# Patient Record
Sex: FEMALE | Race: WHITE | Employment: OTHER | URBAN - METROPOLITAN AREA
[De-identification: names, ages, dates, MRNs, and addresses within clinical notes are randomized per-mention and may not be internally consistent; named-entity substitution may affect disease eponyms.]

---

## 2017-09-06 ENCOUNTER — HOSPITAL ENCOUNTER (OUTPATIENT)
Dept: GENERAL RADIOLOGY | Age: 72
Discharge: HOME | End: 2017-09-06

## 2017-09-06 DIAGNOSIS — R10.32 LEFT LOWER QUADRANT PAIN: ICD-10-CM

## 2017-09-06 LAB
A/G RATIO: 1.5 (ref 1.1–2.2)
ALBUMIN SERPL-MCNC: 4.1 G/DL (ref 3.4–5)
ALP BLD-CCNC: 70 U/L (ref 40–129)
ALT SERPL-CCNC: 8 U/L (ref 10–40)
AMYLASE: 61 U/L (ref 25–115)
ANION GAP SERPL CALCULATED.3IONS-SCNC: 11 MMOL/L (ref 3–16)
AST SERPL-CCNC: 16 U/L (ref 15–37)
BASOPHILS ABSOLUTE: 0 K/UL (ref 0–0.2)
BASOPHILS RELATIVE PERCENT: 0.7 %
BILIRUB SERPL-MCNC: 0.6 MG/DL (ref 0–1)
BUN BLDV-MCNC: 18 MG/DL (ref 7–20)
CALCIUM SERPL-MCNC: 9 MG/DL (ref 8.3–10.6)
CHLORIDE BLD-SCNC: 99 MMOL/L (ref 99–110)
CO2: 28 MMOL/L (ref 21–32)
CREAT SERPL-MCNC: 0.7 MG/DL (ref 0.6–1.2)
EOSINOPHILS ABSOLUTE: 0.1 K/UL (ref 0–0.6)
EOSINOPHILS RELATIVE PERCENT: 1.4 %
GFR AFRICAN AMERICAN: >60
GFR NON-AFRICAN AMERICAN: >60
GLOBULIN: 2.7 G/DL
GLUCOSE BLD-MCNC: 87 MG/DL (ref 70–99)
HCT VFR BLD CALC: 39.6 % (ref 36–48)
HEMOGLOBIN: 13.1 G/DL (ref 12–16)
LIPASE: 47 U/L (ref 13–60)
LYMPHOCYTES ABSOLUTE: 1.7 K/UL (ref 1–5.1)
LYMPHOCYTES RELATIVE PERCENT: 30.3 %
MCH RBC QN AUTO: 32.3 PG (ref 26–34)
MCHC RBC AUTO-ENTMCNC: 33 G/DL (ref 31–36)
MCV RBC AUTO: 97.9 FL (ref 80–100)
MONOCYTES ABSOLUTE: 0.2 K/UL (ref 0–1.3)
MONOCYTES RELATIVE PERCENT: 3.8 %
NEUTROPHILS ABSOLUTE: 3.6 K/UL (ref 1.7–7.7)
NEUTROPHILS RELATIVE PERCENT: 63.8 %
PDW BLD-RTO: 13.8 % (ref 12.4–15.4)
PLATELET # BLD: 231 K/UL (ref 135–450)
PMV BLD AUTO: 8.3 FL (ref 5–10.5)
POTASSIUM SERPL-SCNC: 4.7 MMOL/L (ref 3.5–5.1)
RBC # BLD: 4.04 M/UL (ref 4–5.2)
SODIUM BLD-SCNC: 138 MMOL/L (ref 136–145)
TOTAL PROTEIN: 6.8 G/DL (ref 6.4–8.2)
WBC # BLD: 5.6 K/UL (ref 4–11)

## 2018-05-07 ENCOUNTER — OFFICE VISIT (OUTPATIENT)
Dept: PULMONOLOGY | Age: 73
End: 2018-05-07

## 2018-05-07 ENCOUNTER — HOSPITAL ENCOUNTER (OUTPATIENT)
Dept: CT IMAGING | Age: 73
Discharge: HOME | End: 2018-05-07
Attending: INTERNAL MEDICINE | Admitting: INTERNAL MEDICINE

## 2018-05-07 VITALS
HEART RATE: 60 BPM | BODY MASS INDEX: 25.78 KG/M2 | WEIGHT: 160.4 LBS | RESPIRATION RATE: 18 BRPM | TEMPERATURE: 97.9 F | OXYGEN SATURATION: 97 % | SYSTOLIC BLOOD PRESSURE: 130 MMHG | HEIGHT: 66 IN | DIASTOLIC BLOOD PRESSURE: 70 MMHG

## 2018-05-07 DIAGNOSIS — R07.9 CHEST PAIN, UNSPECIFIED TYPE: ICD-10-CM

## 2018-05-07 DIAGNOSIS — R07.9 CHEST PAIN: ICD-10-CM

## 2018-05-07 DIAGNOSIS — Z86.711 HISTORY OF PULMONARY EMBOLUS (PE): ICD-10-CM

## 2018-05-07 DIAGNOSIS — Z01.812 PRE-PROCEDURE LAB EXAM: ICD-10-CM

## 2018-05-07 DIAGNOSIS — J98.8 RECURRENT RESPIRATORY INFECTION: Primary | ICD-10-CM

## 2018-05-07 DIAGNOSIS — Z86.711 HISTORY OF PULMONARY EMBOLISM: ICD-10-CM

## 2018-05-07 LAB
ALBUMIN SERPL-MCNC: 4.2 G/DL (ref 3.4–5)
ANION GAP SERPL CALCULATED.3IONS-SCNC: 7 MMOL/L (ref 3–16)
BUN BLDV-MCNC: 13 MG/DL (ref 7–20)
CALCIUM SERPL-MCNC: 9.3 MG/DL (ref 8.3–10.6)
CHLORIDE BLD-SCNC: 103 MMOL/L (ref 99–110)
CO2: 31 MMOL/L (ref 21–32)
CREAT SERPL-MCNC: 0.7 MG/DL (ref 0.6–1.2)
GFR AFRICAN AMERICAN: >60
GFR NON-AFRICAN AMERICAN: >60
GLUCOSE BLD-MCNC: 95 MG/DL (ref 70–99)
PHOSPHORUS: 3.7 MG/DL (ref 2.5–4.9)
POTASSIUM SERPL-SCNC: 4.7 MMOL/L (ref 3.5–5.1)
SODIUM BLD-SCNC: 141 MMOL/L (ref 136–145)

## 2018-05-07 PROCEDURE — 3017F COLORECTAL CA SCREEN DOC REV: CPT | Performed by: INTERNAL MEDICINE

## 2018-05-07 PROCEDURE — G8419 CALC BMI OUT NRM PARAM NOF/U: HCPCS | Performed by: INTERNAL MEDICINE

## 2018-05-07 PROCEDURE — 1090F PRES/ABSN URINE INCON ASSESS: CPT | Performed by: INTERNAL MEDICINE

## 2018-05-07 PROCEDURE — G8427 DOCREV CUR MEDS BY ELIG CLIN: HCPCS | Performed by: INTERNAL MEDICINE

## 2018-05-07 PROCEDURE — 99215 OFFICE O/P EST HI 40 MIN: CPT | Performed by: INTERNAL MEDICINE

## 2018-05-07 RX ORDER — ALOSETRON HYDROCHLORIDE 0.5 MG/1
0.5 TABLET, FILM COATED ORAL 2 TIMES DAILY
COMMUNITY
Start: 2018-04-10

## 2018-05-08 ENCOUNTER — TELEPHONE (OUTPATIENT)
Dept: PULMONOLOGY | Age: 73
End: 2018-05-08

## 2018-05-08 DIAGNOSIS — R91.8 PULMONARY NODULES: Primary | ICD-10-CM

## 2018-05-14 ENCOUNTER — HOSPITAL ENCOUNTER (OUTPATIENT)
Dept: SPEECH THERAPY | Age: 73
Discharge: HOME | End: 2018-05-31
Attending: INTERNAL MEDICINE | Admitting: INTERNAL MEDICINE

## 2018-05-16 ENCOUNTER — HOSPITAL ENCOUNTER (OUTPATIENT)
Dept: GENERAL RADIOLOGY | Age: 73
Discharge: HOME | End: 2018-05-16
Admitting: INTERNAL MEDICINE

## 2018-05-16 DIAGNOSIS — R13.10 DYSPHAGIA, UNSPECIFIED TYPE: ICD-10-CM

## 2018-05-22 ENCOUNTER — HOSPITAL ENCOUNTER (OUTPATIENT)
Dept: PULMONOLOGY | Age: 73
Discharge: HOME | End: 2018-05-22
Attending: INTERNAL MEDICINE | Admitting: INTERNAL MEDICINE

## 2018-05-22 VITALS — OXYGEN SATURATION: 100 %

## 2018-05-22 DIAGNOSIS — R07.9 CHEST PAIN: ICD-10-CM

## 2018-05-22 RX ORDER — ALBUTEROL SULFATE 2.5 MG/3ML
2.5 SOLUTION RESPIRATORY (INHALATION) ONCE
Status: COMPLETED | OUTPATIENT
Start: 2018-05-22 | End: 2018-05-22

## 2018-05-22 RX ADMIN — ALBUTEROL SULFATE 2.5 MG: 2.5 SOLUTION RESPIRATORY (INHALATION) at 10:21

## 2018-05-23 ENCOUNTER — TELEPHONE (OUTPATIENT)
Dept: PULMONOLOGY | Age: 73
End: 2018-05-23

## 2018-05-23 DIAGNOSIS — J98.8 RECURRENT RESPIRATORY INFECTION: Primary | ICD-10-CM

## 2018-06-01 ENCOUNTER — HOSPITAL ENCOUNTER (OUTPATIENT)
Dept: OTHER | Age: 73
Discharge: HOME | End: 2018-06-30
Attending: INTERNAL MEDICINE | Admitting: INTERNAL MEDICINE

## 2018-06-07 ENCOUNTER — TELEPHONE (OUTPATIENT)
Dept: PULMONOLOGY | Age: 73
End: 2018-06-07

## 2018-06-07 ENCOUNTER — OFFICE VISIT (OUTPATIENT)
Dept: PULMONOLOGY | Age: 73
End: 2018-06-07

## 2018-06-07 VITALS
BODY MASS INDEX: 26.03 KG/M2 | OXYGEN SATURATION: 95 % | SYSTOLIC BLOOD PRESSURE: 126 MMHG | DIASTOLIC BLOOD PRESSURE: 70 MMHG | TEMPERATURE: 97.6 F | HEIGHT: 66 IN | HEART RATE: 63 BPM | WEIGHT: 162 LBS | RESPIRATION RATE: 18 BRPM

## 2018-06-07 DIAGNOSIS — J98.8 RECURRENT RESPIRATORY INFECTION: ICD-10-CM

## 2018-06-07 DIAGNOSIS — R91.8 PULMONARY NODULES: Primary | ICD-10-CM

## 2018-06-07 PROCEDURE — 1123F ACP DISCUSS/DSCN MKR DOCD: CPT | Performed by: INTERNAL MEDICINE

## 2018-06-07 PROCEDURE — G8419 CALC BMI OUT NRM PARAM NOF/U: HCPCS | Performed by: INTERNAL MEDICINE

## 2018-06-07 PROCEDURE — G8427 DOCREV CUR MEDS BY ELIG CLIN: HCPCS | Performed by: INTERNAL MEDICINE

## 2018-06-07 PROCEDURE — 4040F PNEUMOC VAC/ADMIN/RCVD: CPT | Performed by: INTERNAL MEDICINE

## 2018-06-07 PROCEDURE — 1036F TOBACCO NON-USER: CPT | Performed by: INTERNAL MEDICINE

## 2018-06-07 PROCEDURE — G8400 PT W/DXA NO RESULTS DOC: HCPCS | Performed by: INTERNAL MEDICINE

## 2018-06-07 PROCEDURE — 99213 OFFICE O/P EST LOW 20 MIN: CPT | Performed by: INTERNAL MEDICINE

## 2018-06-07 PROCEDURE — 1090F PRES/ABSN URINE INCON ASSESS: CPT | Performed by: INTERNAL MEDICINE

## 2018-06-07 PROCEDURE — 3017F COLORECTAL CA SCREEN DOC REV: CPT | Performed by: INTERNAL MEDICINE

## 2019-05-29 DIAGNOSIS — M81.0 SENILE OSTEOPOROSIS: ICD-10-CM

## 2019-06-26 ENCOUNTER — HOSPITAL ENCOUNTER (OUTPATIENT)
Dept: NURSING | Age: 74
Setting detail: INFUSION SERIES
Discharge: HOME | End: 2019-06-26
Payer: MEDICARE

## 2019-06-26 VITALS
HEART RATE: 67 BPM | RESPIRATION RATE: 16 BRPM | BODY MASS INDEX: 25.9 KG/M2 | DIASTOLIC BLOOD PRESSURE: 78 MMHG | TEMPERATURE: 97.7 F | HEIGHT: 67 IN | WEIGHT: 165 LBS | SYSTOLIC BLOOD PRESSURE: 134 MMHG

## 2019-06-26 DIAGNOSIS — M81.0 SENILE OSTEOPOROSIS: Primary | ICD-10-CM

## 2019-06-26 PROCEDURE — 96372 THER/PROPH/DIAG INJ SC/IM: CPT

## 2019-06-26 PROCEDURE — 6360000002 HC RX W HCPCS: Performed by: INTERNAL MEDICINE

## 2019-06-26 RX ADMIN — DENOSUMAB 60 MG: 60 INJECTION SUBCUTANEOUS at 11:38

## 2019-06-26 ASSESSMENT — PAIN - FUNCTIONAL ASSESSMENT: PAIN_FUNCTIONAL_ASSESSMENT: 0-10

## 2021-01-25 ENCOUNTER — HOSPITAL ENCOUNTER (OUTPATIENT)
Dept: PHYSICAL THERAPY | Age: 76
Setting detail: THERAPIES SERIES
Discharge: HOME | End: 2021-01-25
Payer: MEDICARE

## 2021-01-25 PROCEDURE — 97162 PT EVAL MOD COMPLEX 30 MIN: CPT

## 2021-01-25 ASSESSMENT — PAIN SCALES - GENERAL: PAINLEVEL_OUTOF10: 2

## 2021-01-25 NOTE — FLOWSHEET NOTE
amb without limits to pain  Long term goal 4: Pt return to PLOF     Plan: [x] Continue per plan of care [] Alter current plan (see comments)   [x] Plan of care initiated [] Hold pending MD visit [] Discharge      Plan for Next Session:      Re-Certification Due Date:         Signature:  Efraín Khan PT

## 2021-01-25 NOTE — PROGRESS NOTES
Physical Therapy  Initial Assessment  Date: 2021  Patient Name: Raphael Jennings  MRN: [id number]  : 1945    Subjective   General  Chart Reviewed: Yes  Patient assessed for rehabilitation services?: Yes  Referring Practitioner: Winnie Car  Referral Date : 21  Diagnosis: SIJ, LBP  Follows Commands: Within Functional Limits  Other (Comment): R TKR,  bulging disc, B foot neuropathy. Fx B feet. General Comment  Comments: PLOF: full functional activity without limits to pain. Currently, 80% of PLOF. PT Visit Information  Onset Date: 20  PT Insurance Information: [employer] Medicare  Subjective  Subjective: Pt reports having a history of IBS and this kept her from working out in the pool this fall. Pt feels her back 'gets stuck'. Went to the MD in December because of the back pain and abdominal pain. Collapsed vertebrae L1. Surgery  to repair L1. Lumbar stenosis. Pain in her sides and into her abdomen. Morning wihtout pain. Worsens during the day and into the evening. 2 injections last week. No pain currently. No pain with walking but hasn't walked longer than 10 mins. No limits to pain with standing or sitting. Pain in the evening. No pain with bending or squatting. Pt takes medication to sleep and has no problems sleeping. Groin weakness B noted wtih sit/stand. 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 Breast CA at that time but not now. No difficulty up/down steps, except in am and at night much slower. No longer has diahrea since the spine surgery. Denies B/B. Pain Screening  Patient Currently in Pain: Yes  Pain Assessment  Pain Assessment: 0-10  Pain Level: 2(at worst 5-6/10)  Patient's Stated Pain Goal: No pain  Vital Signs  Patient Currently in Pain: Yes    Objective     Observation/Palpation  Observation: Standing:  L trunk rotation and lateral shift. Elevated R hip, R pelvic rotation.   Flat lumbar wtih decreased gluteal and core muscle Education & Training, Manual Therapy - Joint Manipulation, Transfer Training, Gait Training    G-Code  Modified Oswestry  20% disability     Goals  Long term goals  Time Frame for Long term goals : 6 weeks  Long term goal 1: Pt independent with HEP  Long term goal 2: Gluteal strength improve by 1/3 muscle grade  Long term goal 3: Pt amb without limits to pain  Long term goal 4: Pt return to PLOF       Therapy Time   Individual Concurrent Group Co-treatment   Time In  2:30         Time Out  3:30         Minutes  0                 Tea Hardin, PT

## 2021-01-28 ENCOUNTER — HOSPITAL ENCOUNTER (OUTPATIENT)
Dept: PHYSICAL THERAPY | Age: 76
Setting detail: THERAPIES SERIES
Discharge: HOME | End: 2021-01-28
Payer: MEDICARE

## 2021-01-28 PROCEDURE — 97112 NEUROMUSCULAR REEDUCATION: CPT

## 2021-01-28 PROCEDURE — 97140 MANUAL THERAPY 1/> REGIONS: CPT

## 2021-01-28 PROCEDURE — 97110 THERAPEUTIC EXERCISES: CPT

## 2021-01-28 NOTE — FLOWSHEET NOTE
[facility], [place] [address] [address]  [address] [place] [address], [address] [place] [address]  Phone: [phone]  Fax [phone]    Physical Therapy Daily Treatment Note    Date:  2021    Patient Name:  Lizzeth Kerns"  :  1945  MRN: [id number]  Restrictions/Precautions:  R TKR, 1975 bulging disc, B foot neuropathy. Collapsed L1. Fx B feet. Medical/Treatment Diagnosis Information:  · Diagnosis: SIJ, LBP  Insurance/Certification information:  PT Insurance Information: [employer]na Medicare  Physician Information:  Referring Practitioner: Sarai Fay  Plan of care signed (Y/N):  Y  Visit# / total visits:     G-Code (if applicable): Modified Oswestry 20% disability     Time in:  12:45      Timed Treatment: 45  Total Treatment Time:  45  Time out: 1:30  ________________________________________________________________________________________    Pain Level:    210  SUBJECTIVE:  LBP is feeling OK today. Abdomen pain is the main complaint 3-4/10. OBJECTIVE:     Exercise/Equipment Resistance/Repetitions Other comments   3 way ball compression   2x30 secs forward  10x5 secs diagonally    TA sets          With march Until achieved with BP cuff   NV HEP    Bridging without lift  10x5 secs  HEP    Clamshells x10 with green TB  HEP    DKTC with feet on TB  x30    Stacking with MB 2x30 secs B            Lumbo-pelvic stabilization NV                             TG    x6 mins                                                        Other Therapeutic Activities:      Manual Treatments: Lumbar gapping gr 2-3 followed by needing and manual stretching. B LAD with straps gr 3-4 followed by hip IR/ER neuro re-ed resisted training. Modalities:      Test/Measurements:         ASSESSMENT:  Pt tolerated therex progression well today without increased pain. Focused on core and gluteal strengthening today.         Treatment/Activity Tolerance:   [x]Patient tolerated treatment well [] Patient limited by fatique  []Patient limited by pain [] Patient limited by other medical complications  [] Other:     Goals:        Long term goals  Time Frame for Long term goals : 6 weeks  Long term goal 1: Pt independent with HEP  Long term goal 2: Gluteal strength improve by 1/3 muscle grade  Long term goal 3: Pt amb without limits to pain  Long term goal 4: Pt return to PLOF     Plan: [x] Continue per plan of care [] Alter current plan (see comments)   [] Plan of care initiated [] Hold pending MD visit [] Discharge      Plan for Next Session:      Re-Certification Due Date:         Signature:  Cookie Otero , PT

## 2021-02-01 ENCOUNTER — HOSPITAL ENCOUNTER (OUTPATIENT)
Dept: PHYSICAL THERAPY | Age: 76
Setting detail: THERAPIES SERIES
Discharge: HOME | End: 2021-02-01
Payer: MEDICARE

## 2021-02-01 PROCEDURE — 97140 MANUAL THERAPY 1/> REGIONS: CPT

## 2021-02-01 PROCEDURE — 97110 THERAPEUTIC EXERCISES: CPT

## 2021-02-03 ENCOUNTER — HOSPITAL ENCOUNTER (OUTPATIENT)
Dept: PHYSICAL THERAPY | Age: 76
Setting detail: THERAPIES SERIES
Discharge: HOME | End: 2021-02-03
Payer: MEDICARE

## 2021-02-03 PROCEDURE — 97140 MANUAL THERAPY 1/> REGIONS: CPT

## 2021-02-03 PROCEDURE — 97110 THERAPEUTIC EXERCISES: CPT

## 2021-02-03 NOTE — FLOWSHEET NOTE
[facility], [facility], [address]   Phone: [phone]  Fax [phone]    Physical Therapy Daily Treatment Note    Date:  2/3/2021    Patient Name:  Isabel Marques"  :  1945  MRN: [id number]  Restrictions/Precautions:  R TKR, 1975 bulging disc, B foot neuropathy. Collapsed L1. Fx B feet. Medical/Treatment Diagnosis Information:  · Diagnosis: SIJ, LBP  Insurance/Certification information:  PT Insurance Information: A[employer]na Medicare  Physician Information:  Referring Practitioner: Juventino Parr  Plan of care signed (Y/N):  Y  Visit# / total visits:     G-Code (if applicable): Modified Oswestry 20% disability     Time in:  2:30      Timed Treatment: 45  Total Treatment Time:  45  Time out: 3:15  ________________________________________________________________________________________    Pain Level:    2/10  SUBJECTIVE:  Pt reports her LBP is improved. Body soreness and abdominal pain persists. OBJECTIVE:     Exercise/Equipment Resistance/Repetitions Other comments   3 way ball compression   2x30 secs forward  10x5 secs diagonally    TA sets          With march Until achieved with BP cuff   x10 without lift  HEP    Bridging without lift  10x5 secs  HEP    Clamshells 10x5 secs with green TB  HEP    DKTC with feet on TB      Stacking with MB 2x30 secs B            Lumbo-pelvic stabilization x5 mins  posterior sling and multifidus    Hip IR/ER neuro re-ed   x5 mins B                      TG    x6 mins            Rockerboard    x1 min F/B M/L  x1 min rocking each                                          Other Therapeutic Activities:      Manual Treatments: Lumbar gapping gr 2-3 followed by needing and manual stretching. B LAD with straps gr 3-4 followed by hip IR/ER neuro re-ed resisted training. Long axis hip IR/ER manual mobs.        Modalities:      Test/Measurements:

## 2021-02-09 ENCOUNTER — APPOINTMENT (OUTPATIENT)
Dept: PHYSICAL THERAPY | Age: 76
End: 2021-02-09
Payer: MEDICARE

## 2021-02-11 ENCOUNTER — HOSPITAL ENCOUNTER (OUTPATIENT)
Dept: PHYSICAL THERAPY | Age: 76
Setting detail: THERAPIES SERIES
Discharge: HOME | End: 2021-02-11
Payer: MEDICARE

## 2021-02-11 NOTE — PROGRESS NOTES
Physical Therapy  Cancellation/No-show Note  Patient Name:  Gabino Ryder  :     Date:  2021  Cancels to date: 1  No-shows to date: 0    For today's appointment patient:  [x] Cancelled  [] Rescheduled appointment  [] No-show     Reason given by patient:  [] Patient ill  [] Conflicting appointment  [] No transportation    [] Conflict with work  [] No reason given  [] Other:     Comments:      Electronically signed by:  Yamilex Peres PT

## 2021-02-18 ENCOUNTER — HOSPITAL ENCOUNTER (OUTPATIENT)
Dept: PHYSICAL THERAPY | Age: 76
Setting detail: THERAPIES SERIES
Discharge: HOME | End: 2021-02-18
Payer: MEDICARE

## 2021-02-18 PROCEDURE — 97140 MANUAL THERAPY 1/> REGIONS: CPT

## 2021-02-18 PROCEDURE — 97110 THERAPEUTIC EXERCISES: CPT

## 2021-02-18 NOTE — FLOWSHEET NOTE
[facility]  [facility], [facility], [address], [address]  Phone: [phone]  Fax [phone]    Physical Therapy Daily Treatment Note    Date:  2021    Patient Name:  Lizzeth Kerns"  :  1945  MRN: [id number]  Restrictions/Precautions:  R TKR, 1975 bulging disc, B foot neuropathy. Collapsed L1. Fx B feet. Medical/Treatment Diagnosis Information:  · Diagnosis: SIJ, LBP  Insurance/Certification information:  PT Insurance Information: A[employer]na Medicare  Physician Information:  Referring Practitioner: Sarai Fay  Plan of care signed (Y/N):  Y  Visit# / total visits:     G-Code (if applicable): Modified Oswestry 20% disability     Time in:  12:52     Timed Treatment: 45  Total Treatment Time:  45  Time out:  1:30  ________________________________________________________________________________________    Pain Level:    2/10  SUBJECTIVE:  Pt reports her LBP is improved but hasn't been to PT for 2 weeks. Groin and abdomen felt better after the injection after 2 weeks. Pain in the abdomen is returning. OBJECTIVE:     Exercise/Equipment Resistance/Repetitions Other comments   3 way ball compression   2x30 secs forward  10x5 secs diagonally    TA sets          With march Until achieved with BP cuff   x10 without lift  HEP    Bridging without lift  10x5 secs  HEP    Clamshells 10x5 secs with green TB  HEP    DKTC with feet on TB  x20    Stacking with MB 2x30 secs B            Lumbo-pelvic stabilization  posterior sling and multifidus    Hip IR/ER neuro re-ed   x5 mins B                      TG    x6 mins            Rockerboard    x1 min F/B M/L  x1 min rocking each                                          Other Therapeutic Activities:      Manual Treatments:  . B LAD with straps gr 3-4 followed by hip IR/ER neuro re-ed resisted training. Long axis hip IR/ER manual mobs.        Modalities: Test/Measurements:         ASSESSMENT:  Pt tolerated therex progression well today without increased pain. Focused on core and gluteal strengthening again today.         Treatment/Activity Tolerance:   [x]Patient tolerated treatment well [] Patient limited by fatique  []Patient limited by pain [] Patient limited by other medical complications  [] Other:     Goals:        Long term goals  Time Frame for Long term goals : 6 weeks  Long term goal 1: Pt independent with HEP  Long term goal 2: Gluteal strength improve by 1/3 muscle grade  Long term goal 3: Pt amb without limits to pain  Long term goal 4: Pt return to PLOF     Plan: [x] Continue per plan of care [] Alter current plan (see comments)   [] Plan of care initiated [] Hold pending MD visit [] Discharge      Plan for Next Session:      Re-Certification Due Date:         Signature:  Lorraine Montgomery PT

## 2021-02-22 ENCOUNTER — HOSPITAL ENCOUNTER (OUTPATIENT)
Dept: PHYSICAL THERAPY | Age: 76
Setting detail: THERAPIES SERIES
Discharge: HOME | End: 2021-02-22
Payer: MEDICARE

## 2021-02-22 PROCEDURE — 97140 MANUAL THERAPY 1/> REGIONS: CPT

## 2021-02-22 PROCEDURE — 97110 THERAPEUTIC EXERCISES: CPT

## 2021-02-22 NOTE — FLOWSHEET NOTE
Lydia  [facility], [facility], [address], [address]  Phone: [phone]  Fax [phone]    Physical Therapy Daily Treatment Note    Date:  2021    Patient Name:  Laurie Nugent"  :  1945  MRN: [id number]  Restrictions/Precautions:  R TKR, 1975 bulging disc, B foot neuropathy. Collapsed L1. Fx B feet. Medical/Treatment Diagnosis Information:  · Diagnosis: SIJ, LBP  Insurance/Certification information:  PT Insurance Information: A[employer]na Medicare  Physician Information:  Referring Practitioner: Anamika Valadez  Plan of care signed (Y/N):  Y  Visit# / total visits:     G-Code (if applicable): Modified Oswestry 20% disability     Time in:  2:30     Timed Treatment: 45  Total Treatment Time:  45  Time out:  3:15  ________________________________________________________________________________________    Pain Level:    2/10  SUBJECTIVE:  Pt reports main complaint is pain in the abdomen has returned. Unable to sleep due to abdomen pain. Still not having diahrria. OBJECTIVE:     Exercise/Equipment Resistance/Repetitions Other comments   3 way ball compression   2x30 secs forward  10x5 secs diagonally    TA sets          With march Until achieved with BP cuff   x10 without lift  HEP    Bridging without lift  10x5 secs  HEP    Clamshells 10x5 secs with green TB  HEP    DKTC with feet on TB  x30    Stacking with MB 2x30 secs B            Lumbo-pelvic stabilization  posterior sling and multifidus    Hip IR/ER neuro re-ed   x5 mins B                      TG    x6 mins            Rockerboard    x1 min F/B M/L  x1 min rocking each                                          Other Therapeutic Activities:      Manual Treatments:  Lumbar gapping gr 2-3 followed by needing and manual stretching. B LAD with straps gr 3-4 followed by hip IR/ER neuro re-ed resisted training. Long axis hip IR/ER manual mobs.   L SI correction with prone gr 4-5. Modalities:      Test/Measurements:    L PI  LLD L shorter than R       ASSESSMENT:  Pt tolerated therex progression well today without increased pain. Focused on core and gluteal strengthening again today.         Treatment/Activity Tolerance:   [x]Patient tolerated treatment well [] Patient limited by fatique  []Patient limited by pain [] Patient limited by other medical complications  [] Other:     Goals:        Long term goals  Time Frame for Long term goals : 6 weeks  Long term goal 1: Pt independent with HEP  Long term goal 2: Gluteal strength improve by 1/3 muscle grade  Long term goal 3: Pt amb without limits to pain  Long term goal 4: Pt return to PLOF     Plan: [x] Continue per plan of care [] Alter current plan (see comments)   [] Plan of care initiated [] Hold pending MD visit [] Discharge      Plan for Next Session:      Re-Certification Due Date:         Signature:  Lorraine Montgomery , PT

## 2021-02-26 ENCOUNTER — APPOINTMENT (OUTPATIENT)
Dept: PHYSICAL THERAPY | Age: 76
End: 2021-02-26
Payer: MEDICARE

## 2021-03-01 ENCOUNTER — HOSPITAL ENCOUNTER (OUTPATIENT)
Dept: PHYSICAL THERAPY | Age: 76
Setting detail: THERAPIES SERIES
Discharge: HOME | End: 2021-03-01
Payer: MEDICARE

## 2021-03-01 PROCEDURE — 97110 THERAPEUTIC EXERCISES: CPT

## 2021-03-01 PROCEDURE — 97112 NEUROMUSCULAR REEDUCATION: CPT

## 2021-03-01 NOTE — PROGRESS NOTES
Lydia  [facility], [facility],  [address], [address]  Phone: [phone]  Fax [phone]    Physical Therapy Daily Treatment Note    Date:  3/1/2021    Patient Name:  Laurie Nugent"  :  1945  MRN: [id number]  Restrictions/Precautions:  R TKR, 1975 bulging disc, B foot neuropathy. Collapsed L1. Fx B feet. Medical/Treatment Diagnosis Information:  · Diagnosis: SIJ, LBP  Insurance/Certification information:  PT Insurance Information: Aetna Medicare  Physician Information:  Referring Practitioner: Anamika Valadez  Plan of care signed (Y/N):  Y  Visit# / total visits:     G-Code (if applicable): Modified Oswestry 3/1/21  22% disability    At initial evaluation  20% disability       Time in:  2:30     Timed Treatment: 45  Total Treatment Time:  45  Time out:  3:15  ________________________________________________________________________________________    Pain Level:    210  SUBJECTIVE:  Pt reports main complaint is pain in the abdomen has returned and worsened. Unable to sleep due to abdomen pain. Still not having diahrria. ASSESSMENT:  Pt tolerated therex progression well today without increased pain. Focused on core and gluteal strengthening again today. Recommended pt hold PT at this time until epidural at the end of the month. No significant reduction in abdomen pain with therapy. Pt to follow up end of month regarding need for further PT.          Treatment/Activity Tolerance:   [x]Patient tolerated treatment well [] Patient limited by fatique  []Patient limited by pain [] Patient limited by other medical complications  [] Other:     Goals:        Long term goals  Time Frame for Long term goals : 6 weeks  Long term goal 1: Pt independent with HEP  Long term goal 2: Gluteal strength improve by 1/3 muscle grade  Long term goal 3: Pt amb without limits to pain  Long term goal 4: Pt return to PLOF     Plan: [x] Continue per plan of care [] Alter current plan (see comments)   [] Plan of care initiated [] Hold pending MD visit [] Discharge      Plan for Next Session:      Re-Certification Due Date:         Signature:  Lorraine Montgomery PT

## 2021-03-01 NOTE — FLOWSHEET NOTE
[facility], [facility], [address], [address]  Phone: [phone]  Fax [phone]    Physical Therapy Daily Treatment Note    Date:  3/1/2021    Patient Name:  Sarita Monroe"  :  1945  MRN: [id number]  Restrictions/Precautions:  R TKR, 1975 bulging disc, B foot neuropathy. Collapsed L1. Fx B feet. Medical/Treatment Diagnosis Information:  · Diagnosis: SIJ, LBP  Insurance/Certification information:  PT Insurance Information: A[employer]na Medicare  Physician Information:  Referring Practitioner: Darnell Ramírez  Plan of care signed (Y/N):  Y  Visit# / total visits:     G-Code (if applicable): Modified Oswestry 3/1/21  22% disability    At initial evaluation  20% disability       Time in:  2:30     Timed Treatment: 45  Total Treatment Time:  45  Time out:  3:15  ________________________________________________________________________________________    Pain Level:    2/10  SUBJECTIVE:  Pt reports main complaint is pain in the abdomen has returned and worsened. Unable to sleep due to abdomen pain. Still not having diahrria.              OBJECTIVE:     Exercise/Equipment Resistance/Repetitions Other comments   3 way ball compression   2x30 secs forward  10x5 secs diagonally    TA sets          With march  HEP    Bridging without lift  10x5 secs  HEP    Clamshells  HEP    DKTC with feet on TB      Stacking with MB 2x30 secs B     Scapular retraction   x15 B maroon TB     Lumbo-pelvic stabilization  posterior sling and multifidus    Hip IR/ER neuro re-ed       S' extension     x15 B maroon TB     Multifidus x15 B maroon TB    TG    x6 mins            Rockerboard    x1 min F/B M/L  x1 min rocking each                                          Other Therapeutic Activities:      Manual Treatments:         Modalities:      Test/Measurements:         ASSESSMENT:  Pt tolerated therex progression well today without increased pain. Focused on core and gluteal strengthening again today. Recommended pt hold PT at this time until epidural at the end of the month. No significant reduction in abdomen pain with therapy. Pt to follow up end of month regarding need for further PT.          Treatment/Activity Tolerance:   [x]Patient tolerated treatment well [] Patient limited by fatique  []Patient limited by pain [] Patient limited by other medical complications  [] Other:     Goals:        Long term goals  Time Frame for Long term goals : 6 weeks  Long term goal 1: Pt independent with HEP  Long term goal 2: Gluteal strength improve by 1/3 muscle grade  Long term goal 3: Pt amb without limits to pain  Long term goal 4: Pt return to PLOF     Plan: [x] Continue per plan of care [] Alter current plan (see comments)   [] Plan of care initiated [] Hold pending MD visit [] Discharge      Plan for Next Session:      Re-Certification Due Date:         Signature:  Kaitlynn Parekh , PT

## 2021-03-04 ENCOUNTER — APPOINTMENT (OUTPATIENT)
Dept: PHYSICAL THERAPY | Age: 76
End: 2021-03-04
Payer: MEDICARE

## 2021-03-09 ENCOUNTER — APPOINTMENT (OUTPATIENT)
Dept: PHYSICAL THERAPY | Age: 76
End: 2021-03-09
Payer: MEDICARE

## 2021-03-11 ENCOUNTER — APPOINTMENT (OUTPATIENT)
Dept: PHYSICAL THERAPY | Age: 76
End: 2021-03-11
Payer: MEDICARE

## 2021-03-16 ENCOUNTER — APPOINTMENT (OUTPATIENT)
Dept: PHYSICAL THERAPY | Age: 76
End: 2021-03-16
Payer: MEDICARE

## 2021-03-18 ENCOUNTER — APPOINTMENT (OUTPATIENT)
Dept: PHYSICAL THERAPY | Age: 76
End: 2021-03-18
Payer: MEDICARE

## 2021-06-15 ENCOUNTER — HOSPITAL ENCOUNTER (EMERGENCY)
Age: 76
Discharge: HOME | End: 2021-06-15
Payer: MEDICARE

## 2021-06-15 VITALS
DIASTOLIC BLOOD PRESSURE: 77 MMHG | BODY MASS INDEX: 26.84 KG/M2 | RESPIRATION RATE: 18 BRPM | OXYGEN SATURATION: 94 % | WEIGHT: 167 LBS | HEART RATE: 68 BPM | SYSTOLIC BLOOD PRESSURE: 150 MMHG | TEMPERATURE: 97.9 F | HEIGHT: 66 IN

## 2021-06-15 DIAGNOSIS — R21 RASH AND OTHER NONSPECIFIC SKIN ERUPTION: ICD-10-CM

## 2021-06-15 DIAGNOSIS — M79.89 LEG SWELLING: Primary | ICD-10-CM

## 2021-06-15 LAB
A/G RATIO: 1.4 (ref 1.1–2.2)
ALBUMIN SERPL-MCNC: 4.2 G/DL (ref 3.4–5)
ALP BLD-CCNC: 77 U/L (ref 40–129)
ALT SERPL-CCNC: 10 U/L (ref 10–40)
ANION GAP SERPL CALCULATED.3IONS-SCNC: 8 MMOL/L (ref 3–16)
AST SERPL-CCNC: 19 U/L (ref 15–37)
BASOPHILS ABSOLUTE: 0 K/UL (ref 0–0.2)
BASOPHILS RELATIVE PERCENT: 0.6 %
BILIRUB SERPL-MCNC: 0.7 MG/DL (ref 0–1)
BUN BLDV-MCNC: 15 MG/DL (ref 7–20)
CALCIUM SERPL-MCNC: 9.5 MG/DL (ref 8.3–10.6)
CHLORIDE BLD-SCNC: 99 MMOL/L (ref 99–110)
CO2: 29 MMOL/L (ref 21–32)
CREAT SERPL-MCNC: 0.6 MG/DL (ref 0.6–1.2)
EOSINOPHILS ABSOLUTE: 0.1 K/UL (ref 0–0.6)
EOSINOPHILS RELATIVE PERCENT: 1.6 %
GFR AFRICAN AMERICAN: >60
GFR NON-AFRICAN AMERICAN: >60
GLOBULIN: 2.9 G/DL
GLUCOSE BLD-MCNC: 99 MG/DL (ref 70–99)
HCT VFR BLD CALC: 37.1 % (ref 36–48)
HEMOGLOBIN: 12.6 G/DL (ref 12–16)
INR BLD: 1.02 (ref 0.86–1.14)
LYMPHOCYTES ABSOLUTE: 1.6 K/UL (ref 1–5.1)
LYMPHOCYTES RELATIVE PERCENT: 29.7 %
MCH RBC QN AUTO: 33 PG (ref 26–34)
MCHC RBC AUTO-ENTMCNC: 34 G/DL (ref 31–36)
MCV RBC AUTO: 97 FL (ref 80–100)
MONOCYTES ABSOLUTE: 0.4 K/UL (ref 0–1.3)
MONOCYTES RELATIVE PERCENT: 6.8 %
NEUTROPHILS ABSOLUTE: 3.3 K/UL (ref 1.7–7.7)
NEUTROPHILS RELATIVE PERCENT: 61.3 %
PDW BLD-RTO: 13.6 % (ref 12.4–15.4)
PLATELET # BLD: 243 K/UL (ref 135–450)
PMV BLD AUTO: 6.9 FL (ref 5–10.5)
POTASSIUM REFLEX MAGNESIUM: 3.9 MMOL/L (ref 3.5–5.1)
PRO-BNP: 132 PG/ML (ref 0–449)
PROTHROMBIN TIME: 11.8 SEC (ref 10–13.2)
RBC # BLD: 3.82 M/UL (ref 4–5.2)
SEDIMENTATION RATE, ERYTHROCYTE: 14 MM/HR (ref 0–30)
SODIUM BLD-SCNC: 136 MMOL/L (ref 136–145)
TOTAL PROTEIN: 7.1 G/DL (ref 6.4–8.2)
WBC # BLD: 5.3 K/UL (ref 4–11)

## 2021-06-15 PROCEDURE — 86140 C-REACTIVE PROTEIN: CPT

## 2021-06-15 PROCEDURE — 96372 THER/PROPH/DIAG INJ SC/IM: CPT

## 2021-06-15 PROCEDURE — 85610 PROTHROMBIN TIME: CPT

## 2021-06-15 PROCEDURE — 85025 COMPLETE CBC W/AUTO DIFF WBC: CPT

## 2021-06-15 PROCEDURE — 99285 EMERGENCY DEPT VISIT HI MDM: CPT

## 2021-06-15 PROCEDURE — 80053 COMPREHEN METABOLIC PANEL: CPT

## 2021-06-15 PROCEDURE — 6360000002 HC RX W HCPCS: Performed by: PHYSICIAN ASSISTANT

## 2021-06-15 PROCEDURE — 85652 RBC SED RATE AUTOMATED: CPT

## 2021-06-15 PROCEDURE — 83880 ASSAY OF NATRIURETIC PEPTIDE: CPT

## 2021-06-15 RX ORDER — METHYLPREDNISOLONE SODIUM SUCCINATE 125 MG/2ML
80 INJECTION, POWDER, LYOPHILIZED, FOR SOLUTION INTRAMUSCULAR; INTRAVENOUS ONCE
Status: COMPLETED | OUTPATIENT
Start: 2021-06-15 | End: 2021-06-15

## 2021-06-15 RX ORDER — PREDNISONE 20 MG/1
60 TABLET ORAL DAILY
Qty: 15 TABLET | Refills: 0 | Status: SHIPPED | OUTPATIENT
Start: 2021-06-15 | End: 2021-06-20

## 2021-06-15 RX ADMIN — ENOXAPARIN SODIUM 80 MG: 80 INJECTION SUBCUTANEOUS at 19:01

## 2021-06-15 RX ADMIN — METHYLPREDNISOLONE SODIUM SUCCINATE 80 MG: 125 INJECTION, POWDER, FOR SOLUTION INTRAMUSCULAR; INTRAVENOUS at 19:01

## 2021-06-15 ASSESSMENT — PAIN DESCRIPTION - ORIENTATION: ORIENTATION: LEFT

## 2021-06-15 ASSESSMENT — PAIN DESCRIPTION - LOCATION: LOCATION: LEG

## 2021-06-15 ASSESSMENT — PAIN DESCRIPTION - PAIN TYPE: TYPE: ACUTE PAIN

## 2021-06-15 NOTE — ED PROVIDER NOTES
[facility] Emergency Department    CHIEF COMPLAINT  Leg Swelling (leg swelling and petechiae/redness to left lower leg, small spot on right leg)      SHARED SERVICE VISIT  Evaluated by ALLIE. My supervising physician was available for consultation. HISTORY OF PRESENT ILLNESS  Beryl Casillas is a 76 y.o. female who presents to the ED complaining of a 1 day history of red rash to lower extremities bilaterally left greater than right. Patient dropped off by  today for evaluation. First noticed this yesterday. She denies pain or discomfort. States that she has had intermittent swelling of the lower extremities daily which improves in the mornings. No calf pain or tenderness. No chest pain shortness of breath. No recent travel, trauma or surgery. Does have history of blood clots. Not currently on blood thinners. She denies any fevers chills. Currently on Augmentin for history of colitis. Believes she has had this medication in the past.    No other complaints, modifying factors or associated symptoms. Nursing notes reviewed.    Past Medical History:   Diagnosis Date    Arthritis     Breast cancer ([address]     GERD (gastroesophageal reflux disease)     Hypertension     Pneumonia     Pulmonary emboli (HCC)     Reflux      Past Surgical History:   Procedure Laterality Date    BACK SURGERY      BREAST BIOPSY      CHOLECYSTECTOMY  4-    COLOSTOMY      FRACTURE SURGERY      right wrist    HYSTERECTOMY      JOINT REPLACEMENT  2012    knee    KNEE SURGERY      LYMPH NODE DISSECTION      MASTECTOMY      bilateral     Family History   Problem Relation Age of Onset    Breast Cancer Mother         breast    Diabetes Father     High Cholesterol Father     Hypertension Father     Diabetes Paternal Grandmother     Diabetes Paternal Grandfather     Asthma Grandchild     Osteoarthritis Sister     Depression Son     Alcohol Abuse Brother     Emphysema Neg Hx      Social History     Socioeconomic History    Marital status: Single     Spouse name: Not on file    Number of children: Not on file    Years of education: Not on file    Highest education level: Not on file   Occupational History    Not on file   Tobacco Use    Smoking status: Never Smoker    Smokeless tobacco: Never Used   Vaping Use    Vaping Use: Never used   Substance and Sexual Activity    Alcohol use: Yes     Alcohol/week: 7.0 standard drinks     Types: 7 Glasses of wine per week     Comment: glass of wine nightly    Drug use: No    Sexual activity: Never   Other Topics Concern    Not on file   Social History Narrative    Not on file     Social Determinants of Health     Financial Resource Strain:     Difficulty of Paying Living Expenses:    Food Insecurity:     Worried About Running Out of Food in the Last Year:     [address] in the Last Year:    Transportation Needs:     Lack of Transportation (Medical):      Lack of Transportation (Non-Medical):    Physical Activity:     Days of Exercise per Week:     Minutes of Exercise per Session:    Stress:     Feeling of Stress :    Social Connections:     Frequency of Communication with Friends and Family:     Frequency of Social Gatherings with Friends and Family:     Attends Worship Services:     Active Member of Clubs or Organizations:     Attends Club or Organization Meetings:     Marital Status:    Intimate Partner Violence:     Fear of Current or Ex-Partner:     Emotionally Abused:     Physically Abused:     Sexually Abused:      Current Facility-Administered Medications   Medication Dose Route Frequency Provider Last Rate Last Admin    methylPREDNISolone sodium (SOLU-MEDROL) injection 80 mg  80 mg Intramuscular Once Carol Jean Baptiste        enoxaparin (LOVENOX) injection 80 mg  1 mg/kg Subcutaneous Once Carol Jean Batpiste         Current Outpatient Medications   Medication Sig Dispense Refill    predniSONE (DELTASONE) 20 MG tablet Take 3 tablets by mouth daily for 5 days 15 tablet 0    alosetron (LOTRONEX) 0.5 MG tablet Take 0.5 mg by mouth      ondansetron (ZOFRAN ODT) 4 MG disintegrating tablet Take 1 tablet by mouth every 8 hours as needed for Nausea 20 tablet 0    LORazepam (ATIVAN) 1 MG tablet Take 1 tablet by mouth every 8 hours as needed 270 tablet 3    escitalopram (LEXAPRO) 10 MG tablet Take 10 mg by mouth daily      Probiotic Product ([address]) Take  by mouth.  celecoxib (CELEBREX) 200 MG capsule Take 200 mg by mouth daily.  esomeprazole (NEXIUM) 40 MG capsule Take 40 mg by mouth every morning (before breakfast).  gabapentin (NEURONTIN) 300 MG capsule Take 300 mg by mouth 2 times daily. 2 tabs twice a day        No Known Allergies    REVIEW OF SYSTEMS  10 systems reviewed, pertinent positives per HPI otherwise noted to be negative    PHYSICAL EXAM  BP (!) 150/77   Pulse 68   Temp 97.9 °F (36.6 °C) (Oral)   Resp 18   Ht 5' 6\" (1.676 m)   Wt 167 lb (75.8 kg)   SpO2 94%   BMI 26.95 kg/m²   GENERAL APPEARANCE: Awake and alert. Cooperative. No acute distress. HEAD: Normocephalic. Atraumatic. EYES: PERRL. EOM's grossly intact. ENT: Mucous membranes are moist.   NECK: Supple. HEART: RRR. No murmurs. LUNGS: Respirations unlabored. CTAB. Good air exchange. Speaking comfortably in full sentences. ABDOMEN: Soft. Non-distended. Non-tender. No guarding or rebound. No masses. No organomegaly. EXTREMITIES: No peripheral edema. Moves all extremities equally. All extremities neurovascularly intact. Patient with erythema nonblanching to lower extremities bilaterally left greater than right. No evidence for infectious process. No vesicles, blistering or sloughing. No streaking and compartments are soft without crepitus. Mild unilateral swelling to the left again without palpable cords or masses.   Pedal pulses +2 and cap refill less than 5 seconds. SKIN: Warm and dry. No acute rashes. NEUROLOGICAL: Alert and oriented. CN's 2-12 intact. No gross facial drooping. Strength 5/5, sensation intact. PSYCHIATRIC: Normal mood and affect. ED COURSE  Pain control was not required while here in the emergency department. Triage vitals stable. CBC without leukocytosis or anemia. CMP was unremarkable. BNP negative. ESR and CRP pending. PT/INR within normal limits. This does appear more consistent with a vasculitis than cellulitic process. Patient given dose of steroids. She is concerned with blood clot as she has history of. Clinically does not appear consistent although she was given dose of Lovenox here and will be discharged with an order for an outpatient ultrasound to be performed tomorrow. A discussion was had with Ms. Will regarding lower extremity swelling and redness, ED findings and recommendations for follow-up. Risk management discussed and shared decision making had with patient and/or surrogate. All questions were answered. Patient will follow up with PCP tomorrow for further evaluation/treatment. All questions answered. Patient will return to ED for new/worsening symptoms. Patient was sent home with prednisone and a prescription for venous ultrasound of lower extremities and informed to continue home medications as prescribed.     MDM  Results for orders placed or performed during the hospital encounter of 06/15/21   CBC Auto Differential   Result Value Ref Range    WBC 5.3 4.0 - 11.0 K/uL    RBC 3.82 (L) 4.00 - 5.20 M/uL    Hemoglobin 12.6 12.0 - 16.0 g/dL    Hematocrit 37.1 36.0 - 48.0 %    MCV 97.0 80.0 - 100.0 fL    MCH 33.0 26.0 - 34.0 pg    MCHC 34.0 31.0 - 36.0 g/dL    RDW 13.6 12.4 - 15.4 %    Platelets 935 234 - 426 K/uL    MPV 6.9 5.0 - 10.5 fL    Neutrophils % 61.3 %    Lymphocytes % 29.7 %    Monocytes % 6.8 %    Eosinophils % 1.6 %    Basophils % 0.6 %    Neutrophils Absolute 3.3 1.7 - 7.7 K/uL    Lymphocytes Absolute 1.6 1.0 - 5.1 K/uL    Monocytes Absolute 0.4 0.0 - 1.3 K/uL    Eosinophils Absolute 0.1 0.0 - 0.6 K/uL    Basophils Absolute 0.0 0.0 - 0.2 K/uL   Comprehensive Metabolic Panel w/ Reflex to MG   Result Value Ref Range    Sodium 136 136 - 145 mmol/L    Potassium reflex Magnesium 3.9 3.5 - 5.1 mmol/L    Chloride 99 99 - 110 mmol/L    CO2 29 21 - 32 mmol/L    Anion Gap 8 3 - 16    Glucose 99 70 - 99 mg/dL    BUN 15 7 - 20 mg/dL    CREATININE 0.6 0.6 - 1.2 mg/dL    GFR Non-African American >60 >60    GFR African American >60 >60    Calcium 9.5 8.3 - 10.6 mg/dL    Total Protein 7.1 6.4 - 8.2 g/dL    Albumin 4.2 3.4 - 5.0 g/dL    Albumin/Globulin Ratio 1.4 1.1 - 2.2    Total Bilirubin 0.7 0.0 - 1.0 mg/dL    Alkaline Phosphatase 77 40 - 129 U/L    ALT 10 10 - 40 U/L    AST 19 15 - 37 U/L    Globulin 2.9 g/dL   Brain Natriuretic Peptide   Result Value Ref Range    Pro- 0 - 449 pg/mL     I estimate there is LOW risk for COMPARTMENT SYNDROME, DEEP VENOUS THROMBOSIS, SEPTIC ARTHRITIS, TENDON OR NEUROVASCULAR INJURY, thus I consider the discharge disposition reasonable. Joe Cassidy and I have discussed the diagnosis and risks, and we agree with discharging home to follow-up with their primary doctor or the referral orthopedist. We also discussed returning to the Emergency Department immediately if new or worsening symptoms occur. We have discussed the symptoms which are most concerning (e.g., changing or worsening pain, numbness, weakness) that necessitate immediate return. Final Impression  1. Leg swelling    2. Rash and other nonspecific skin eruption      Blood pressure (!) 150/77, pulse 68, temperature 97.9 °F (36.6 °C), temperature source Oral, resp. rate 18, height 5' 6\" (1.676 m), weight 167 lb (75.8 kg), SpO2 94 %. DISPOSITION  Patient was discharged to home in good condition.          [place], [place]  06/15/21 1139

## 2021-06-15 NOTE — ED TRIAGE NOTES
Shellie Ordonez is a 76 y.o. female who presents to the ED via  for rashes two lower left leg. Pt reports symptoms began last night. Pt also reports left calf tenderness. Pt reports having a baseline of leg swelling at night that resolves by morning, but pt reports swelling did not resolve. Pt reports Hx of 1 previous PE. Pt denies chest pain, shortness of breath, pain with dorsiflexion, anticoagulation use. Pt resting in bed with call light within reach and updated on plan of care; pending provider to assess. Pt denies any needs at this time.

## 2021-06-16 LAB — C-REACTIVE PROTEIN: 7.6 MG/L (ref 0–5.1)

## 2021-11-30 ENCOUNTER — HOSPITAL ENCOUNTER (OUTPATIENT)
Dept: PHYSICAL THERAPY | Age: 76
Setting detail: THERAPIES SERIES
Discharge: HOME | End: 2021-11-30
Payer: MEDICARE

## 2021-11-30 PROCEDURE — 97530 THERAPEUTIC ACTIVITIES: CPT

## 2021-11-30 PROCEDURE — 97140 MANUAL THERAPY 1/> REGIONS: CPT

## 2021-11-30 PROCEDURE — 97161 PT EVAL LOW COMPLEX 20 MIN: CPT

## 2021-11-30 NOTE — FLOWSHEET NOTE
[facility], [facility], [address], [address]  Phone: [phone]  Fax [phone]      Physical Therapy Daily Treatment Note    Date:  2021    Patient Name:  Katya Villalobos    :    MRN: [id number]  Restrictions/Precautions:  Universal   Medical/Treatment Diagnosis Information:  · Diagnosis: M62.89 (ICD-10-CM) - Pelvic floor dysfunction  Insurance/Certification information:  PT Insurance Information: [employer] Medicare  Physician Information:  Referring Practitioner: Deandra Peña MD  Plan of care signed (Y/N): N  Visit# / total visits:  1/10     Functional Outcome (if applicable): PFDI-20: 133/300; 41% impaired    Medicare Cap (if applicable):  n/a = total amount used, updated 2021    Time in:   7:30am   Timed Treatment: 75min. Total Treatment Time:  90min.  ________________________________________________________________________________________    Pain Level:    /10  SUBJECTIVE:  See eval    OBJECTIVE:     Exercise (TE) Resistance/Repetitions Other comments            PF strengthening                                  PF relaxation                                   Other Treatment:   TA:  Bladder re-training/education:     Bladder Diary: patient educated on importance of filling out bladder diary at home, complete with fluid intake, voids, and leakage when applicable. Voiding: patient educated on normal voiding and urinary cycle and the physiology of bladder control muscles and pelvic floor. The patient was educated on bladder dysfunction as well as TEO with urethral involvement. The patient was also educated on prolapse and it's affect on urination and pain.      Dietary:     Other: pelvic fracture, relation to pelvic floor and pain    Manual Treatments:    PERFECT, myofacial release     Modalities:  --    Test/Measurements:  See eval           ASSESSMENT:   See eval Treatment/Activity Tolerance:   [x] Patient tolerated treatment well [] Patient limited by fatique  [] Patient limited by pain [] Patient limited by other medical complications  [] Other:     Goals:       Patient stated goal: \"to not have pain\"  []? Progressing: []? Met: []? Not Met: []? Adjusted        Therapist goals for Patient:   Short Term Goals: To be achieved in: 2 weeks  1. Independent in HEP and progression per patient tolerance, in order to prevent future occurrence of presenting issue. []? Progressing: []? Met: []? Not Met: []? Adjusted  2. Patient will have a 25% decrease in pain to indicate improvement in pelvic floor strength and relaxation, muscle coordination, and/or bladder retraining.  []? Progressing: []? Met: []? Not Met: []? Adjusted     Long Term Goals: To be achieved in: 10 weeks  1. Disability index score of 50 or less for the PFDI-20 to assist with reaching prior level of function. []? Progressing: []? Met: []? Not Met: []? Adjusted  2. Patient will report ability to tolerate penetration without increase in pain to progress towards intercourse / examinations without pain or limitation. []? Progressing: []? Met: []? Not Met: []? Adjusted  3. Patient will increase PERF score to 3+/5 with endurance of 10sec. to demonstrate increased strength and control over pelvic floor musculature. []? Progressing: []? Met: []? Not Met: []? Adjusted  4. Patient will report 1 day or greater without urinary leakage to progress towards completing ADLs and recreational activities without leakage. []? Progressing: []? Met: []? Not Met: []? Adjusted  5. Patient will return to functional activities including walking and water aerobics without increased symptoms or restriction. []? Progressing: []? Met: []? Not Met: []?  Adjusted          Plan: [x] Continue per plan of care [] Alter current plan (see comments)   [] Plan of care initiated [] Hold pending MD visit [] Discharge      Plan for Next Session: See eval    Re-Certification Due Date:  12/30/2021       Signature:  Odilia Mai, PT, DPT

## 2021-11-30 NOTE — FLOWSHEET NOTE
Lydia  79. and Therapy, [facility], [address], [address]  Phone: [phone]  Fax [phone]                                                        Physical Therapy Certification    Dear Referring Practitioner: Vincent Leyva MD,    We had the pleasure of evaluating the following patient for physical therapy services at [address]. A summary of our findings can be found in the initial assessment below. This includes our plan of care. If you have any questions or concerns regarding these findings, please do not hesitate to contact me at the office phone number checked above. Thank you for the referral.       Physician Signature:_______________________________Date:__________________  By signing above (or electronic signature), therapist's plan is approved by physician      Patient: Bhavin Paiz   : 1945   MRN: [id number]  Referring Physician: Referring Practitioner: Vincent Leyva MD      Evaluation Date: 2021      Medical Diagnosis Information:  Diagnosis: M62.89 (ICD-10-CM) - Pelvic floor dysfunction                                             Insurance information: PT Insurance Information: Aetna Medicare    Second person requested for examination:  [x] No    [] Yes    Precautions/ Contra-indications: universal     Latex Allergy:  [x]NO      []YES    Preferred Language for Healthcare:   [x]English       []Other:    C-SSRS Triggered by Intake questionnaire (Past 2 wk assessment ):   [x] No, Questionnaire did not trigger screening.   [] Yes, Patient intake triggered C-SSRS Screening     [] Completed, no further action required. [] Completed, PCP notified via Epic    Functional Outcome: PFDI-20: 133/300; 41% impaired       SUBJECTIVE: Patient reports she had a fall in 2021 and fractured her pelvis. Reports she had conservative treatment for the pelvic fracture. States \"I just feel like my insides are falling out\". Reports she has a long history of back pain and back surgery. States \"my doctor said I need to do back therapy one day or another\". Reports \"I do have some leakage, but I think that is getting better\". \"When I get up at night to pee is when I have leakage\". Reports she also has IBS and has controlled that with diet. Is not currently sexually active. During hysterectomy surgery reports \"they tacked up my bladder with that\". Denies history of sexual abuse/truama. Urinary frequency? Daytime? NO Nighttime? NO  Bowel problems? IBS  Urinary or bowel leakage? Urinary   Leakage frequency? Occasionally   Protection: none   Pregnancy:   # of pregnancies: 4   # of deliveries: 4 (all vaginal, 5 children)   Episiotomy: yes   Normal post-leah healing? yes  Date of last pap smear? Hysterectomy        4 week or greater of failed trial of PFPT program?   [x] No    [] Yes    PFPT program as defined by \"Completing 4 weeks of an ordered plan of pelvic muscle exercises designed to increase periurethral muscle strength\". Relevant Medical History: see chart, reviewed with patient.     Pain Scale: 0-5/10  Easing factors: resting  Provocative factors: increased activity  Type: []Constant   [x]Intermittent  []Radiating []Localized []other:     Numbness/Tingling: not currently, prior to epidural (a few weeks ago)    Occupation/School: retired     Living Status/Prior Level of Function: Prior to this injury / incident, pt was independent with ADLs and IADLs    OBJECTIVE:    Observation:   Posture:WFL   Gait analysis: slow dinora  Lumbar Mobility: WFL      Special Tests:  Sacroiliac Test:   Gaenslen: positive   Gillet: positive   Lumbar Spine:   Slump test: negative      Neuro:   Sensation: (B) UEs:intact to light touch   Sensation: (B) LEs: intact to light touch   Anal Sensation:    S5: intact to light touch    S4: intact to light touch    S3:intact to light touch   Reflexes: Anal: present    Cough: present    Pelvic Floor Exam  External:  Skin Condition: redness/irritation noted around anus, patient report from hemorrhoid cream  Sensation: intact  Palpation: point tenderness noted (B) LA  Tone: hypotonic  Perineal Body Mobility:    Voluntary PFM Contraction: present (normal)   Voluntary PFM Relaxation: present (normal)   Involuntary PFM Contraction/Cough Reflex: present (normal)   Involuntary PFM Relaxation: present (normal)  Perineal Body Descent:   Rest: supported   Bearing down:  absent (normal-cephalad)      Internal:  Sensation: intact  Palpation: significant point tenderness noted (B) LA, pudendal nerve  Vaginal Vault Size: decreased  PERFECT:   Power: 3-/5   Endurance: 7sec. Repetitions:10   Fast Twitch:2   Elevation:not present   Co-contraction:present, weak   Timing:present    Pelvic Organ Prolapse: none noted today                           [x] Patient history, allergies, meds reviewed. Medical chart reviewed. See intake form. Review Of Systems (ROS):  [x]Performed Review of systems (Integumentary, CardioPulmonary, Neurological) by intake and observation. Intake form has been scanned into medical record. Patient has been instructed to contact their primary care physician regarding ROS issues if not already being addressed at this time.       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 The patient seems very motivated and should continue to improve s/p epidural with HEP and PT compliance.           Functional Impairments:    []Noted lumbar/proximal hip hypomobility  []Noted lumbosacral and/or generalized hypermobility  [x]Decreased core/proximal hip strength and neuromuscular control   [x]Decreased LE functional strength  []pelvic/sacral/spinal malalignment   [x]Increased pain with penetration  [x]Absent/poor control of PF contraction   []Absent/poor control of PF relaxation  []Decreased control of bladder  [x]Decreased control of bowel    Functional Activity Limitations (from functional questionnaire and intake)  [x]Reduced ability to maintain good posture and demonstrate good body mechanics with sitting, bending, and lifting  []Reduced ability to perform lifting, reaching, carrying tasks  [x]Reduced ability to control urine  [x]Reduced ability to control bowel movements   []Reduced ability to ambulate prolonged functional periods/distances/surfaces  []Reduced ability to squat   []Reduced ability to forward bend  []Reduced ability to ascend/descend stairs  [x]Reduced ability to tolerate penetration/intercourse    Participation Restrictions  [x]Reduced participation in self care activities  [x]Reduced participation in home management activities  [x]Reduced participation in work activities  [x]Reduced participation in social activities  [x]Reduced participation in sport/recreational activities    Classification/Subgrouping:  []signs/symptoms consistent vaginismus/dyspareunia    []signs/symptoms consistent with pelvic floor organ prolapse  [x]signs/symptoms consistent with stress urinary incontinence  [x]signs/symptoms consistent with urge urinary incontinence  []signs/symptoms consistent with bowel incontinence  [x]signs/symptoms consistent with post-fracture status including decreased ROM, strength and function  []signs/symptoms consistent with other: Prognosis/Rehab Potential:      []Excellent   [x]Good    []Fair   []Poor    Tolerance of evaluation/treatment:    []Excellent   [x]Good    []Fair   []Poor    Physical Therapy Evaluation Complexity Justification  [x] A history of present problem with:  [] no personal factors and/or comorbidities that impact the plan of care;  [x]1-2 personal factors and/or comorbidities that impact the plan of care  []3 personal factors and/or comorbidities that impact the plan of care  [x] An examination of body systems using standardized tests and measures addressing any of the following: body structures and functions (impairments), activity limitations, and/or participation restrictions;:  [x] a total of 1-2 or more elements   [] a total of 3 or more elements   [] a total of 4 or more elements   [x] A clinical presentation with:  [x] stable and/or uncomplicated characteristics   [] evolving clinical presentation with changing characteristics  [] unstable and unpredictable characteristics;   [x] Clinical decision making of [x] low, [] moderate, [] high complexity using standardized patient assessment instrument and/or measurable assessment of functional outcome.     [x] EVAL (LOW) 60003 (typically 20 minutes face-to-face)  [] EVAL (MOD) 30233 (typically 30 minutes face-to-face)  [] EVAL (HIGH) 85521 (typically 45 minutes face-to-face)  [] RE-EVAL       PLAN:   Frequency/Duration: 1 day per week for 10 Weeks:  Interventions:  [x]  Therapeutic exercise including: strength training, ROM, and functional mobility for joint, spine, core, and pelvic floor   [x]  NMR activation and proprioception for abdominals, pelvic floor musculature activation and coordination, and posture retraining   [x]  Manual therapy as indicated for spine, ribs, soft tissue, and pelvic floor to include: IASTM with or without dilator, STM, PROM, Gr I-IV mobilizations   [x] Modalities as needed that may include: E-stim, Biofeedback as indicated  [x] Patient education on pelvic floor anatomy and function, bladder and bowel anatomy and function, joint protection, postural re-education, activity modification, progression of HEP. HEP instruction: Written HEP instructions provided and reviewed    GOALS:  Patient stated goal: \"to not have pain\"  [] Progressing: [] Met: [] Not Met: [] Adjusted      Therapist goals for Patient:   Short Term Goals: To be achieved in: 2 weeks  1. Independent in HEP and progression per patient tolerance, in order to prevent future occurrence of presenting issue. [] Progressing: [] Met: [] Not Met: [] Adjusted  2. Patient will have a 25% decrease in pain to indicate improvement in pelvic floor strength and relaxation, muscle coordination, and/or bladder retraining. [] Progressing: [] Met: [] Not Met: [] Adjusted    Long Term Goals: To be achieved in: 10 weeks  1. Disability index score of 50 or less for the PFDI-20 to assist with reaching prior level of function. [] Progressing: [] Met: [] Not Met: [] Adjusted  2. Patient will report ability to tolerate penetration without increase in pain to progress towards intercourse / examinations without pain or limitation. [] Progressing: [] Met: [] Not Met: [] Adjusted  3. Patient will increase PERF score to 3+/5 with endurance of 10sec. to demonstrate increased strength and control over pelvic floor musculature. [] Progressing: [] Met: [] Not Met: [] Adjusted  4. Patient will report 1 day or greater without urinary leakage to progress towards completing ADLs and recreational activities without leakage. [] Progressing: [] Met: [] Not Met: [] Adjusted  5. Patient will return to functional activities including walking and water aerobics without increased symptoms or restriction.    [] Progressing: [] Met: [] Not Met: [] Adjusted    Electronically signed by:  Franck Wellington PT, DOLLY

## 2021-12-14 ENCOUNTER — HOSPITAL ENCOUNTER (OUTPATIENT)
Dept: PHYSICAL THERAPY | Age: 76
Setting detail: THERAPIES SERIES
Discharge: HOME | End: 2021-12-14
Payer: MEDICARE

## 2021-12-23 ENCOUNTER — HOSPITAL ENCOUNTER (OUTPATIENT)
Dept: PHYSICAL THERAPY | Age: 76
Setting detail: THERAPIES SERIES
Discharge: HOME | End: 2021-12-23
Payer: MEDICARE

## 2021-12-23 NOTE — FLOWSHEET NOTE
[facility], [facility], [address] [place] [address]  Phone: [phone]  Fax [phone]        Physical Therapy  Cancellation/No-show Note  Patient Name:  Steve Raya  :  [birth year]   Date:  2021  Cancels to date: 0  No-shows to date: 2    For today's appointment patient:  [] Cancelled  [] Rescheduled appointment  [x] No-show     Reason given by patient:  [] Patient ill  [] Conflicting appointment  [] No transportation    [] Conflict with work  [] No reason given  [] Other:     Comments:      Electronically signed by:  Lynsey James PT

## 2021-12-28 ENCOUNTER — HOSPITAL ENCOUNTER (OUTPATIENT)
Dept: PHYSICAL THERAPY | Age: 76
Setting detail: THERAPIES SERIES
Discharge: HOME | End: 2021-12-28
Payer: MEDICARE

## 2021-12-28 PROCEDURE — 97110 THERAPEUTIC EXERCISES: CPT

## 2021-12-28 NOTE — FLOWSHEET NOTE
Lydia  79. and Therapy, [facility], [address], [address]  Phone: [phone]  Fax [phone]      Physical Therapy Daily Treatment Note and Discharge Summary     Date:  2021    Patient Name:  Violet Mcmullen    :    MRN: [id number]  Restrictions/Precautions:  Universal   Medical/Treatment Diagnosis Information:  · Diagnosis: M62.89 (ICD-10-CM) - Pelvic floor dysfunction  Insurance/Certification information:  PT Insurance Information: Aetna Medicare  Physician Information:  Referring Practitioner: Greg Alfred MD  Plan of care signed (Y/N): N  Visit# / total visits:  2 (2021 to 2021)     Functional Outcome (if applicable): PFDI-20: 6/300; 2%impaired (was 133/300; 41% impaired)    Medicare Cap (if applicable):  n/a = total amount used, updated 2021    Time in:   9:00am   Timed Treatment: 40min. Total Treatment Time:  40min.  ________________________________________________________________________________________    Pain Level:    0/10  SUBJECTIVE:  The patient reports \"well I had cataracts surgery so I wasn't able to make the appointments\". \"I had an epidural and I haven't had any problems\". \"I didn't even do the sheets you asked me to do because I am doing good\". Reports she would like today to be her last visit and continue on own. OBJECTIVE:     Exercise (TE) Resistance/Repetitions Other comments            PF strengthening        Short hold: (1sec) 10 times       Long hold: (7-10sec) 10 times       PF + Hip ABD x10    PF + Hip ADD x10    Bridge x10    Hook-lying TA x10                         HEP:  Access Code: [email or web address]  URL: [email or web address]  Date: 2021  Prepared by: Stu Dejesus    Exercises  Seated Pelvic Floor Contraction - 4-6 x daily - 7 x weekly - 1 sets - 10 reps - 1sec. hold  Seated Pelvic Floor Contraction - 4-6 x daily - 7 x weekly - 1 sets - 10 reps - 7sec. hold  Seated Pelvic Floor Contraction with Isometric Hip Adduction - 1 x daily - 7 x weekly - 1 sets - 10 reps - 2-3sec. hold  Seated Pelvic Floor Contraction with Hip Abduction and Resistance Loop - 1 x daily - 7 x weekly - 1 sets - 10 reps - 2-3sec. hold  Hooklying Transversus Abdominis Palpation - 1 x daily - 7 x weekly - 1 sets - 10 reps - 5-10sec. hold  Supine Bridge with Pelvic Floor Contraction - 1 x daily - 7 x weekly - 1 sets - 10 reps - 2-3sec. hold      Other Treatment:       Manual Treatments:       Modalities:  --    Test/Measurements:  See eval           ASSESSMENT:  Patient has made great progress since evaluation. Patient given updated HEP and will continue on own per her request.  The patient will follow up with PT and/or MD if further problems arise. No pain at end of session. No internal exam performed today. Treatment/Activity Tolerance:   [x] Patient tolerated treatment well [] Patient limited by fatique  [] Patient limited by pain [] Patient limited by other medical complications  [] Other:     Goals:       Patient stated goal: \"to not have pain\"  []? Progressing: [x]? Met: []? Not Met: []? Adjusted        Therapist goals for Patient:   Short Term Goals: To be achieved in: 2 weeks  1. Independent in HEP and progression per patient tolerance, in order to prevent future occurrence of presenting issue. []? Progressing: [x]? Met: []? Not Met: []? Adjusted  2. Patient will have a 25% decrease in pain to indicate improvement in pelvic floor strength and relaxation, muscle coordination, and/or bladder retraining.  []? Progressing: [x]? Met: []? Not Met: []? Adjusted     Long Term Goals: To be achieved in: 10 weeks  1. Disability index score of 50 or less for the PFDI-20 to assist with reaching prior level of function. []? Progressing: [x]? Met: []? Not Met: []? Adjusted  2.  Patient will report ability to tolerate penetration without increase in pain to progress towards intercourse / examinations without pain or limitation. []? Progressing: [x]? Met: []? Not Met: []? Adjusted  3. Patient will increase PERF score to 3+/5 with endurance of 10sec. to demonstrate increased strength and control over pelvic floor musculature. []? Progressing: []? Met: [x]? Not Met: []? Adjusted  4. Patient will report 1 day or greater without urinary leakage to progress towards completing ADLs and recreational activities without leakage. []? Progressing: [x]? Met: []? Not Met: []? Adjusted  5. Patient will return to functional activities including walking and water aerobics without increased symptoms or restriction. []? Progressing: [x]? Met: []? Not Met: []?  Adjusted          Plan: [] Continue per plan of care [] Alter current plan (see comments)   [] Plan of care initiated [] Hold pending MD visit [x] Discharge          Signature:  Ashley Watkins, PT, DPT

## 2022-06-08 ENCOUNTER — HOSPITAL ENCOUNTER (OUTPATIENT)
Age: 77
Setting detail: SPECIMEN
Discharge: HOME | End: 2022-06-08

## 2022-06-13 NOTE — PROGRESS NOTES
Place patient label inside box (if no patient label, complete below)  Name:  :  MR#:   Ace Josue / PROCEDURE  1. I (we), Ivette Fletcher (Patient Name) authorize Kassi Hernandez MD (Provider / Herlinda Nunez) and/or such assistants as may be selected by him/her, to perform the following operation/procedure(s): EXCISION OF LEFT AXILLARY NODE        Note: If unable to obtain consent prior to an emergent procedure, document the emergent reason in the medical record. This procedure has been explained to my (our) satisfaction and included in the explanation was:  A) The intended benefit, nature, and extent of the procedure to be performed;  B) The significant risks involved and the probability of success;  C) Alternative procedures and methods of treatment;  D) The dangers and probable consequences of such alternatives (including no procedure or treatment); E) The expected consequences of the procedure on my future health;  F) Whether other qualified individuals would be performing important surgical tasks and/or whether  would be present to advise or support the procedure. I (we) understand that there are other risks of infection and other serious complications in the pre-operative/procedural and postoperative/procedural stages of my (our) care. I (we) have asked all of the questions which I (we) thought were important in deciding whether or not to undergo treatment or diagnosis. These questions have been answered to my (our) satisfaction. I (we) understand that no assurance can be given that the procedure will be a success, and no guarantee or warranty of success has been given to me (us). 2. It has been explained to me (us) that during the course of the operation/procedure, unforeseen conditions may be revealed that necessitate extension of the original procedure(s) or different procedure(s) than those set forth in Paragraph 1.  I (we) authorize and request that the above-named physician, his/her assistants or his/her designees, perform procedures as necessary and desirable if deemed to be in my (our) best interest.     Revised 8/2/2021                                                                          Page 1 of 2                   3. I acknowledge that health care personnel may be observing this procedure for the purpose of medical education or other specified purposes as may be necessary as requested and/or approved by my (our) physician. 4. I (we) consent to the disposal by the hospital Pathologist of the removed tissue, parts or organs in accordance with hospital policy. 5. I do ____ do not ____ consent to the use of a local infiltration pain blocking agent that will be used by my provider/surgical provider to help alleviate pain during my procedure. 6. I do ____ do not ____ consent to an emergent blood transfusion in the case of a life-threatening situation that requires blood components to be administered. This consent is valid for 24 hours from the beginning of the procedure. 7. This patient does ____ or does not ____ currently have a DNR status/order. If DNR order is in place, obtain Addendum to the Surgical Consent for ALL Patients with a DNR Order to address aditya-operative status for limited intervention or DNR suspension.      8. I have read and fully understand the above Consent for Operation/Procedure and that all blanks were completed before I signed the consent.   _____________________________       _____________________      ____/____am/pm  Signature of Patient or legal representative      Printed Name / Relationship            Date / Time   ____________________________       _____________________      ____/____am/pm  Witness to Signature                                    Printed Name                    Date / Time     If patient is unable to sign or is a minor, complete the following)  Patient is a minor, ____ years of age, or unable to sign because:   ______________________________________________________________________________________________    Marilyn Me If a phone consent is obtained, consent will be documented by using two health care professionals, each affirming that the consenting party has no questions and gives consent for the procedure discussed with the physician/provider.   _____________________          ____________________       _____/_____am/pm   2nd witness to phone consent        Printed name           Date / Time    Informed Consent:  I have provided the explanation described above in section 1 to the patient and/or legal representative.  I have provided the patient and/or legal representative with an opportunity to ask any questions about the proposed operation/procedure.   ___________________________          ____________________         ____/____am/pm  Provider / Proceduralist                            Printed name            Date / Time  Revised 8/2/2021                                                                      Page 2 of 2

## 2022-06-14 ENCOUNTER — ANESTHESIA EVENT (OUTPATIENT)
Dept: OPERATING ROOM | Age: 77
End: 2022-06-14
Payer: MEDICARE

## 2022-06-14 RX ORDER — ALPRAZOLAM 1 MG/1
1 TABLET ORAL NIGHTLY
COMMUNITY

## 2022-06-14 RX ORDER — DENOSUMAB 60 MG/ML
60 INJECTION SUBCUTANEOUS
COMMUNITY
Start: 2021-10-29

## 2022-06-14 RX ORDER — OMEPRAZOLE 20 MG/1
CAPSULE, DELAYED RELEASE ORAL
COMMUNITY
Start: 2022-03-02

## 2022-06-14 RX ORDER — HYDROCHLOROTHIAZIDE 25 MG/1
TABLET ORAL DAILY
COMMUNITY

## 2022-06-14 NOTE — PROGRESS NOTES
[facility] PRE-SURGICAL TESTING INSTRUCTIONS                                  PRIOR TO PROCEDURE DATE:        1. PLEASE FOLLOW ANY  GUIDELINES/ INSTRUCTIONS PRIOR TO YOUR PROCEDURE AS ADVISED BY YOUR SURGEON. 2. Arrange for someone to drive you home and be with you for the first 24 hours after discharge for your safety after your procedure for which you received sedation. Ensure it is someone we can share information with regarding your discharge. 3. You must contact your surgeon for instructions IF:   You are taking any blood thinners, aspirin, anti-inflammatory or vitamin E.   There is a change in your physical condition such as a cold, fever, rash, cuts, sores or any other infection, especially near your surgical site. 4. Do not drink alcohol the day before or day of your procedure. 5. A Pre-op History and Physical for surgery MUST be completed by your Physician or Urgent Care within 30 days of your procedure date. Please bring a copy with you on the day of your procedure and along with any other testing performed. THE DAY OF YOUR PROCEDURE:  1. Follow instructions for ARRIVAL TIME as DIRECTED BY YOUR SURGEON. 2. Enter the MAIN entrance from [employer] and follow the signs to the free [place] or [place] parking (offered free of charge 6am-5pm). 3. Enter the Main Entrance of the hospital (do not enter from the lower level of the parking garage). Upon entrance, check in with the  at the main desk on your left. If no one is available at the desk, proceed into the [facility] Waiting Room and go through the door directly into the [facility]. There is a Check-in desk ACROSS from Room 5 (marked with a sign hanging from the ceiling). The phone number for the surgery center is [phone]. 4. Please call [phone] option #2 option #2 if you have not been preregistered yet.   On the day of your procedure bring your insurance card and photo ID. You will be registered at your bedside once brought back to your room. 5. DO NOT EAT ANYTHING eight hours prior to your arrival for surgery. May have 8 ounces of water 4 hours prior to your arrival for surgery. NOTE: ALL Gastric, Bariatric and Bowel surgery patients MUST follow their surgeon's instructions. 6. MEDICATIONS    Take the following medications with a SMALL sip of water: None   Bariatric patient's call surgeon if on diabetic medications as some need to be stopped 1 week preop   Use your usual dose of inhalers the morning of surgery. BRING your rescue inhaler with you to hospital.    Anesthesia does NOT want you to take insulin the morning of surgery. They will control your blood sugar while you are at the hospital. Please contact your ordering physician for instructions regarding your insulin the night before your procedure. If you have an insulin pump, please keep it set on basal rate. 7. Do not swallow water when brushing teeth. No gum, candy, mints or ice chips. Refrain from smoking or at least decrease the amount. 8. Dress in loose, comfortable clothing appropriate for redressing after your procedure. Do not wear jewelry (including body piercings), make-up (especially NO eye make-up), fingernail polish (NO toenail polish if foot/leg surgery), lotion, powders or metal hairclips. 9. Dentures, glasses, or contacts will need to be removed before your procedure. Bring cases for your glasses, contacts, dentures, or hearing aids to protect them while you are in surgery. 10. If you use a CPAP, please bring it with you on the day of your procedure. 11. We recommend that valuable personal  belongings such as cash, cell phones, e-tablets or jewelry, be left at home during your stay. The hospital will not be responsible for valuables that are not secured in the hospital safe.  However, if your insurance requires a co-pay, you may want to bring a method of payment, i.e. Check or credit card, if you wish to pay your co-pay the day of surgery. 12. If you are to stay overnight, you may bring a bag with personal items. Please have any large items you may need brought in by your family after your arrival to your hospital room. 15. If you have a Living Will or Durable Power of , please bring a copy on the day of your procedure. 15. With your permission, one family member may accompany you while you are being prepared for surgery. Once you are ready, additional family members may join you. HOW WE KEEP YOU SAFE and WORK TO PREVENT SURGICAL SITE INFECTIONS:  1. Health care workers should always check your ID bracelet to verify your name and birth date. You will be asked many times to state your name, date of birth, and allergies. 2. Health care workers should always clean their hands with soap or alcohol gel before providing care to you. It is okay to ask anyone if they cleaned their hands before they touch you. 3. You will be actively involved in verifying the type of procedure you are having and ensuring the correct surgical site. This will be confirmed multiple times prior to your procedure. Do NOT huong your surgery site UNLESS instructed to by your surgeon. 4. Do not shave or wax for 72 hours prior to procedure near your operative site. Shaving with a razor can irritate your skin and make it easier to develop an infection. On the day of your procedure, any hair that needs to be removed near the surgical site will be clipped by a healthcare worker using a special clippers designed to avoid skin irritation. 5. When you are in the operating room, your surgical site will be cleansed with a special soap, and in most cases, you will be given an antibiotic before the surgery begins. What to expect AFTER YOUR PROCEDURE:  1. Immediately following your procedure, your will be taken to the PACU for the first phase of your recovery.   Your nurse will help you recover from any potential side effects of anesthesia, such as extreme drowsiness, changes in your vital signs or breathing patterns. Nausea, headache, muscle aches, or sore throat may also occur after anesthesia. Your nurse will help you manage these potential side effects. 2. For comfort and safety, arrange to have someone at home with you for the first 24 hours after discharge. 3. You and your family will be given written instructions about your diet, activity, dressing care, medications, and return visits. 4. Once at home, should issues with nausea, pain, or bleeding occur, or should you notice any signs of infection, you should call your surgeon. 5. Always clean your hands before and after caring for your wound. Do not let your family touch your surgery site without cleaning their hands. 6. Narcotic pain medications can cause significant constipation. You may want to add a stool softener to your postoperative medication schedule or speak to your surgeon on how best to manage this SIDE EFFECT. SPECIAL INSTRUCTIONS None    Thank you for allowing us to care for you. We strive to exceed your expectations in the delivery of care and service provided to you and your family. If you need to contact the [place] [address] staff for any reason, please call us at [phone]    Instructions reviewed with patient during preadmission testing phone interview.   Lana Patel RN.6/14/2022 .9:52 AM      ADDITIONAL EDUCATIONAL INFORMATION REVIEWED PER PHONE WITH YOU AND/OR YOUR FAMILY:  No Hibiclens® Bathing Instructions   Yes Antibacterial Soap

## 2022-06-15 ENCOUNTER — ANESTHESIA (OUTPATIENT)
Dept: OPERATING ROOM | Age: 77
End: 2022-06-15
Payer: MEDICARE

## 2022-06-15 ENCOUNTER — HOSPITAL ENCOUNTER (OUTPATIENT)
Age: 77
Setting detail: OUTPATIENT SURGERY
Discharge: HOME | End: 2022-06-15
Attending: SURGERY | Admitting: SURGERY
Payer: MEDICARE

## 2022-06-15 VITALS
WEIGHT: 159.2 LBS | SYSTOLIC BLOOD PRESSURE: 138 MMHG | OXYGEN SATURATION: 96 % | BODY MASS INDEX: 25.58 KG/M2 | RESPIRATION RATE: 18 BRPM | HEART RATE: 80 BPM | DIASTOLIC BLOOD PRESSURE: 77 MMHG | TEMPERATURE: 97.6 F | HEIGHT: 66 IN

## 2022-06-15 DIAGNOSIS — N63.32 MASS OF AXILLARY TAIL OF LEFT BREAST: ICD-10-CM

## 2022-06-15 DIAGNOSIS — Z85.3 PERSONAL HISTORY OF MALIGNANT NEOPLASM OF BREAST: ICD-10-CM

## 2022-06-15 LAB
A/G RATIO: 1.8 (ref 1.1–2.2)
ALBUMIN SERPL-MCNC: 4.1 G/DL (ref 3.4–5)
ALP BLD-CCNC: 73 U/L (ref 40–129)
ALT SERPL-CCNC: 12 U/L (ref 10–40)
ANION GAP SERPL CALCULATED.3IONS-SCNC: 9 MMOL/L (ref 3–16)
AST SERPL-CCNC: 18 U/L (ref 15–37)
BILIRUB SERPL-MCNC: 0.7 MG/DL (ref 0–1)
BUN BLDV-MCNC: 17 MG/DL (ref 7–20)
CALCIUM SERPL-MCNC: 9.3 MG/DL (ref 8.3–10.6)
CHLORIDE BLD-SCNC: 92 MMOL/L (ref 99–110)
CO2: 32 MMOL/L (ref 21–32)
CREAT SERPL-MCNC: 0.6 MG/DL (ref 0.6–1.2)
GFR AFRICAN AMERICAN: >60
GFR NON-AFRICAN AMERICAN: >60
GLUCOSE BLD-MCNC: 104 MG/DL (ref 70–99)
HCT VFR BLD CALC: 35.7 % (ref 36–48)
HEMOGLOBIN: 12.2 G/DL (ref 12–16)
MCH RBC QN AUTO: 32.2 PG (ref 26–34)
MCHC RBC AUTO-ENTMCNC: 34.1 G/DL (ref 31–36)
MCV RBC AUTO: 94.6 FL (ref 80–100)
PDW BLD-RTO: 14 % (ref 12.4–15.4)
PLATELET # BLD: 339 K/UL (ref 135–450)
PMV BLD AUTO: 6.6 FL (ref 5–10.5)
POTASSIUM SERPL-SCNC: 3.6 MMOL/L (ref 3.5–5.1)
RBC # BLD: 3.77 M/UL (ref 4–5.2)
SODIUM BLD-SCNC: 133 MMOL/L (ref 136–145)
TOTAL PROTEIN: 6.4 G/DL (ref 6.4–8.2)
WBC # BLD: 6.1 K/UL (ref 4–11)

## 2022-06-15 PROCEDURE — 3700000001 HC ADD 15 MINUTES (ANESTHESIA): Performed by: SURGERY

## 2022-06-15 PROCEDURE — 3700000000 HC ANESTHESIA ATTENDED CARE: Performed by: SURGERY

## 2022-06-15 PROCEDURE — 7100000011 HC PHASE II RECOVERY - ADDTL 15 MIN: Performed by: SURGERY

## 2022-06-15 PROCEDURE — 7100000001 HC PACU RECOVERY - ADDTL 15 MIN: Performed by: SURGERY

## 2022-06-15 PROCEDURE — 2709999900 HC NON-CHARGEABLE SUPPLY: Performed by: SURGERY

## 2022-06-15 PROCEDURE — 6360000002 HC RX W HCPCS: Performed by: SURGERY

## 2022-06-15 PROCEDURE — 3600000014 HC SURGERY LEVEL 4 ADDTL 15MIN: Performed by: SURGERY

## 2022-06-15 PROCEDURE — 80053 COMPREHEN METABOLIC PANEL: CPT

## 2022-06-15 PROCEDURE — 7100000010 HC PHASE II RECOVERY - FIRST 15 MIN: Performed by: SURGERY

## 2022-06-15 PROCEDURE — 2580000003 HC RX 258: Performed by: NURSE ANESTHETIST, CERTIFIED REGISTERED

## 2022-06-15 PROCEDURE — A4217 STERILE WATER/SALINE, 500 ML: HCPCS | Performed by: SURGERY

## 2022-06-15 PROCEDURE — 85027 COMPLETE CBC AUTOMATED: CPT

## 2022-06-15 PROCEDURE — 2580000003 HC RX 258: Performed by: SURGERY

## 2022-06-15 PROCEDURE — 3600000004 HC SURGERY LEVEL 4 BASE: Performed by: SURGERY

## 2022-06-15 PROCEDURE — 88305 TISSUE EXAM BY PATHOLOGIST: CPT

## 2022-06-15 PROCEDURE — 2500000003 HC RX 250 WO HCPCS: Performed by: SURGERY

## 2022-06-15 PROCEDURE — 6360000002 HC RX W HCPCS: Performed by: ANESTHESIOLOGY

## 2022-06-15 PROCEDURE — 7100000000 HC PACU RECOVERY - FIRST 15 MIN: Performed by: SURGERY

## 2022-06-15 PROCEDURE — 6360000002 HC RX W HCPCS: Performed by: NURSE ANESTHETIST, CERTIFIED REGISTERED

## 2022-06-15 PROCEDURE — 2500000003 HC RX 250 WO HCPCS: Performed by: NURSE ANESTHETIST, CERTIFIED REGISTERED

## 2022-06-15 PROCEDURE — 2580000003 HC RX 258: Performed by: ANESTHESIOLOGY

## 2022-06-15 RX ORDER — HYDROCODONE BITARTRATE AND ACETAMINOPHEN 5; 325 MG/1; MG/1
1 TABLET ORAL EVERY 6 HOURS PRN
Qty: 10 TABLET | Refills: 0 | Status: SHIPPED | OUTPATIENT
Start: 2022-06-15 | End: 2022-06-20

## 2022-06-15 RX ORDER — SODIUM CHLORIDE 9 MG/ML
INJECTION, SOLUTION INTRAVENOUS PRN
Status: DISCONTINUED | OUTPATIENT
Start: 2022-06-15 | End: 2022-06-15 | Stop reason: HOSPADM

## 2022-06-15 RX ORDER — LIDOCAINE HYDROCHLORIDE 10 MG/ML
INJECTION, SOLUTION INFILTRATION; PERINEURAL PRN
Status: DISCONTINUED | OUTPATIENT
Start: 2022-06-15 | End: 2022-06-15 | Stop reason: HOSPADM

## 2022-06-15 RX ORDER — SODIUM CHLORIDE 0.9 % (FLUSH) 0.9 %
5-40 SYRINGE (ML) INJECTION EVERY 12 HOURS SCHEDULED
Status: DISCONTINUED | OUTPATIENT
Start: 2022-06-15 | End: 2022-06-15 | Stop reason: HOSPADM

## 2022-06-15 RX ORDER — SODIUM CHLORIDE 0.9 % (FLUSH) 0.9 %
5-40 SYRINGE (ML) INJECTION PRN
Status: DISCONTINUED | OUTPATIENT
Start: 2022-06-15 | End: 2022-06-15 | Stop reason: HOSPADM

## 2022-06-15 RX ORDER — SODIUM CHLORIDE, SODIUM LACTATE, POTASSIUM CHLORIDE, CALCIUM CHLORIDE 600; 310; 30; 20 MG/100ML; MG/100ML; MG/100ML; MG/100ML
INJECTION, SOLUTION INTRAVENOUS CONTINUOUS PRN
Status: DISCONTINUED | OUTPATIENT
Start: 2022-06-15 | End: 2022-06-15 | Stop reason: SDUPTHER

## 2022-06-15 RX ORDER — ONDANSETRON 4 MG/1
4 TABLET, FILM COATED ORAL EVERY 8 HOURS PRN
Qty: 2 TABLET | Refills: 1 | Status: SHIPPED | OUTPATIENT
Start: 2022-06-15

## 2022-06-15 RX ORDER — DIPHENHYDRAMINE HYDROCHLORIDE 50 MG/ML
12.5 INJECTION INTRAMUSCULAR; INTRAVENOUS
Status: DISCONTINUED | OUTPATIENT
Start: 2022-06-15 | End: 2022-06-15 | Stop reason: HOSPADM

## 2022-06-15 RX ORDER — MAGNESIUM HYDROXIDE 1200 MG/15ML
LIQUID ORAL CONTINUOUS PRN
Status: DISCONTINUED | OUTPATIENT
Start: 2022-06-15 | End: 2022-06-15 | Stop reason: HOSPADM

## 2022-06-15 RX ORDER — ONDANSETRON 2 MG/ML
INJECTION INTRAMUSCULAR; INTRAVENOUS PRN
Status: DISCONTINUED | OUTPATIENT
Start: 2022-06-15 | End: 2022-06-15 | Stop reason: SDUPTHER

## 2022-06-15 RX ORDER — FENTANYL CITRATE 50 UG/ML
INJECTION, SOLUTION INTRAMUSCULAR; INTRAVENOUS PRN
Status: DISCONTINUED | OUTPATIENT
Start: 2022-06-15 | End: 2022-06-15 | Stop reason: SDUPTHER

## 2022-06-15 RX ORDER — PHENYLEPHRINE HYDROCHLORIDE 10 MG/ML
INJECTION INTRAVENOUS PRN
Status: DISCONTINUED | OUTPATIENT
Start: 2022-06-15 | End: 2022-06-15 | Stop reason: SDUPTHER

## 2022-06-15 RX ORDER — PROPOFOL 10 MG/ML
INJECTION, EMULSION INTRAVENOUS PRN
Status: DISCONTINUED | OUTPATIENT
Start: 2022-06-15 | End: 2022-06-15 | Stop reason: SDUPTHER

## 2022-06-15 RX ORDER — LIDOCAINE HYDROCHLORIDE 20 MG/ML
INJECTION, SOLUTION INTRAVENOUS PRN
Status: DISCONTINUED | OUTPATIENT
Start: 2022-06-15 | End: 2022-06-15 | Stop reason: SDUPTHER

## 2022-06-15 RX ORDER — DEXAMETHASONE SODIUM PHOSPHATE 4 MG/ML
INJECTION, SOLUTION INTRA-ARTICULAR; INTRALESIONAL; INTRAMUSCULAR; INTRAVENOUS; SOFT TISSUE PRN
Status: DISCONTINUED | OUTPATIENT
Start: 2022-06-15 | End: 2022-06-15 | Stop reason: SDUPTHER

## 2022-06-15 RX ORDER — APREPITANT 40 MG/1
40 CAPSULE ORAL ONCE
Status: COMPLETED | OUTPATIENT
Start: 2022-06-15 | End: 2022-06-15

## 2022-06-15 RX ORDER — PROCHLORPERAZINE EDISYLATE 5 MG/ML
5 INJECTION INTRAMUSCULAR; INTRAVENOUS
Status: DISCONTINUED | OUTPATIENT
Start: 2022-06-15 | End: 2022-06-15 | Stop reason: HOSPADM

## 2022-06-15 RX ORDER — HYDRALAZINE HYDROCHLORIDE 20 MG/ML
10 INJECTION INTRAMUSCULAR; INTRAVENOUS
Status: DISCONTINUED | OUTPATIENT
Start: 2022-06-15 | End: 2022-06-15 | Stop reason: HOSPADM

## 2022-06-15 RX ORDER — SODIUM CHLORIDE, SODIUM LACTATE, POTASSIUM CHLORIDE, CALCIUM CHLORIDE 600; 310; 30; 20 MG/100ML; MG/100ML; MG/100ML; MG/100ML
INJECTION, SOLUTION INTRAVENOUS CONTINUOUS
Status: DISCONTINUED | OUTPATIENT
Start: 2022-06-15 | End: 2022-06-15 | Stop reason: HOSPADM

## 2022-06-15 RX ORDER — ONDANSETRON 2 MG/ML
4 INJECTION INTRAMUSCULAR; INTRAVENOUS
Status: DISCONTINUED | OUTPATIENT
Start: 2022-06-15 | End: 2022-06-15 | Stop reason: HOSPADM

## 2022-06-15 RX ORDER — MEPERIDINE HYDROCHLORIDE 25 MG/ML
12.5 INJECTION INTRAMUSCULAR; INTRAVENOUS; SUBCUTANEOUS AS NEEDED
Status: DISCONTINUED | OUTPATIENT
Start: 2022-06-15 | End: 2022-06-15 | Stop reason: HOSPADM

## 2022-06-15 RX ORDER — GLYCOPYRROLATE 1 MG/5 ML
SYRINGE (ML) INTRAVENOUS PRN
Status: DISCONTINUED | OUTPATIENT
Start: 2022-06-15 | End: 2022-06-15 | Stop reason: SDUPTHER

## 2022-06-15 RX ADMIN — LIDOCAINE HYDROCHLORIDE 60 MG: 20 INJECTION, SOLUTION INTRAVENOUS at 14:36

## 2022-06-15 RX ADMIN — SODIUM CHLORIDE, POTASSIUM CHLORIDE, SODIUM LACTATE AND CALCIUM CHLORIDE: 600; 310; 30; 20 INJECTION, SOLUTION INTRAVENOUS at 14:06

## 2022-06-15 RX ADMIN — Medication 0.6 MG: at 14:46

## 2022-06-15 RX ADMIN — FENTANYL CITRATE 50 MCG: 50 INJECTION, SOLUTION INTRAMUSCULAR; INTRAVENOUS at 14:47

## 2022-06-15 RX ADMIN — PROPOFOL 200 MG: 10 INJECTION, EMULSION INTRAVENOUS at 14:36

## 2022-06-15 RX ADMIN — CEFAZOLIN 2000 MG: 2 INJECTION, POWDER, FOR SOLUTION INTRAMUSCULAR; INTRAVENOUS at 14:31

## 2022-06-15 RX ADMIN — SODIUM CHLORIDE, SODIUM LACTATE, POTASSIUM CHLORIDE, AND CALCIUM CHLORIDE: .6; .31; .03; .02 INJECTION, SOLUTION INTRAVENOUS at 14:32

## 2022-06-15 RX ADMIN — APREPITANT 40 MG: 40 CAPSULE ORAL at 14:04

## 2022-06-15 RX ADMIN — DEXAMETHASONE SODIUM PHOSPHATE 4 MG: 4 INJECTION, SOLUTION INTRAMUSCULAR; INTRAVENOUS at 15:05

## 2022-06-15 RX ADMIN — FENTANYL CITRATE 50 MCG: 50 INJECTION, SOLUTION INTRAMUSCULAR; INTRAVENOUS at 14:36

## 2022-06-15 RX ADMIN — PHENYLEPHRINE HYDROCHLORIDE 200 MCG: 10 INJECTION INTRAVENOUS at 14:42

## 2022-06-15 RX ADMIN — ONDANSETRON 4 MG: 2 INJECTION INTRAMUSCULAR; INTRAVENOUS at 15:04

## 2022-06-15 RX ADMIN — Medication 0.4 MG: at 14:44

## 2022-06-15 ASSESSMENT — PAIN - FUNCTIONAL ASSESSMENT
PAIN_FUNCTIONAL_ASSESSMENT: PREVENTS OR INTERFERES SOME ACTIVE ACTIVITIES AND ADLS
PAIN_FUNCTIONAL_ASSESSMENT: 0-10

## 2022-06-15 ASSESSMENT — PAIN DESCRIPTION - DESCRIPTORS: DESCRIPTORS: ACHING;DULL

## 2022-06-15 ASSESSMENT — PAIN SCALES - GENERAL
PAINLEVEL_OUTOF10: 0
PAINLEVEL_OUTOF10: 0

## 2022-06-15 ASSESSMENT — LIFESTYLE VARIABLES: SMOKING_STATUS: 0

## 2022-06-15 NOTE — BRIEF OP NOTE
Brief Postoperative Note    Name           : Patricia Leon  YOB: 1945  MRN             : [id number]    Pre-operative Diagnosis: 1. Left breast cancer - axillary recurrence    Post-operative Diagnosis: Same    Procedure: 1. Left deep axillary node biopsy    Anesthesia: General and Local    Surgeons/Assistants: Mignon Carson    Estimated Blood Loss: less than 50     Complications: None    Specimens: Was Obtained: 1.  Left axillary node    Findings: same as post op    Electronically signed by Eligio Mckinney MD on 6/15/2022 at 3:09 PM

## 2022-06-15 NOTE — PROGRESS NOTES
Patient admitted to PACU bed 12 from OR via stretcher s/p EXCISION OF LEFT AXILLARY NODE - Left, report received at bedside from ERICA and Dr. Delaney Client @ [phone]. No complications reported. Pt connected to PACU monitoring equipment, IVF infusing, no pain noted. Patient arrived not fully awake from anesthesia, on O2 @ 4L NC in oral airway, breathing easy and unlabored. Surgical incision covered to L axilla covered with dressing C/D/I. Will continue to monitor.

## 2022-06-15 NOTE — PROGRESS NOTES
Ambulatory Surgery/Procedure Discharge Note    Vitals:    06/15/22 1700   BP: 138/77   Pulse: 80   Resp: 18   Temp: 97.6 °F (36.4 °C)   SpO2: 96%       In: 400 [P.O.:300; I.V.:100]  Out: -     Restroom use offered before discharge. Yes    Pain assessment:  level of pain (1-10, 10 severe)  Pain Level: 0        Patient discharged to home/self care. Patient discharged via wheel chair by transporter to waiting friend.        6/15/2022 5:42 PM

## 2022-06-15 NOTE — H&P
Date of Surgery Update:  Yvonne Jeff was seen, history and physical examination reviewed, and patient examined by me today. There have been no significant clinical changes since the completion of the previous history and physical. The surgical site was confirmed by the patient and me. I have presented reasonable alternatives to the patient's proposed care, treatment, and services. The discussion I have done encompassed risks, benefits, and side effects related to the alternatives and the risks related to not receiving the proposed care, treatment, and services. All questions answered. Patient wishes to proceed.      Electronically signed by: Khari Masters MD,6/15/2022,11:59 AM

## 2022-06-15 NOTE — ANESTHESIA PRE PROCEDURE
Department of Anesthesiology  Preprocedure Note       Name:  Reji Condon   Age:  68 y.o.  :  1945                                          MRN:  [id number]         Date:  6/15/2022      Surgeon: Hilary Linares):  Bettie Galan MD    Procedure: Procedure(s):  EXCISION OF LEFT AXILLARY NODE    Medications prior to admission:   Prior to Admission medications    Medication Sig Start Date End Date Taking? Authorizing Provider   omeprazole (PRILOSEC) 20 MG delayed release capsule TAKE 1 CAPSULE DAILY 3/2/22  Yes Historical Provider, MD   cyanocobalamin 1000 MCG tablet TAKE 1 TABLET DAILY 21  Yes Historical Provider, MD   denosumab (PROLIA) 60 MG/ML SOSY SC injection Inject 60 mg into the skin 10/29/21  Yes Historical Provider, MD   hydroCHLOROthiazide (HYDRODIURIL) 25 MG tablet Take by mouth daily Pt not sure of dose   Yes Historical Provider, MD   ALPRAZolam (XANAX) 1 MG tablet Take 1 mg by mouth nightly. Historical Provider, MD   alosetron (LOTRONEX) 0.5 MG tablet Take 0.5 mg by mouth 4/10/18   Historical Provider, MD   ondansetron (ZOFRAN ODT) 4 MG disintegrating tablet Take 1 tablet by mouth every 8 hours as needed for Nausea 3/26/18   JOSE Maurice - CNP   LORazepam (ATIVAN) 1 MG tablet Take 1 tablet by mouth every 8 hours as needed  Patient not taking: Reported on 2022 8/25/15   Melodie Rowland DO   escitalopram (LEXAPRO) 10 MG tablet Take 10 mg by mouth daily    Historical Provider, MD   Probiotic Product ([address]) Take  by mouth. Patient not taking: Reported on 2022    Historical Provider, MD   celecoxib (CELEBREX) 200 MG capsule Take 200 mg by mouth daily. Historical Provider, MD   esomeprazole (NEXIUM) 40 MG capsule Take 40 mg by mouth every morning (before breakfast). Patient not taking: Reported on 2022    Historical Provider, MD   gabapentin (NEURONTIN) 300 MG capsule Take 300 mg by mouth 2 times daily.  2 tabs twice a day   Patient not taking: Reported on 6/14/2022 4/6/11   Historical Provider, MD       Current medications:    Current Facility-Administered Medications   Medication Dose Route Frequency Provider Last Rate Last Admin    ceFAZolin (ANCEF) 2,000 mg in sodium chloride 0.9 % 50 mL IVPB (mini-bag)  2,000 mg IntraVENous Once Cecille Sanders MD        lactated ringers infusion   IntraVENous Continuous Benjiman Climes, DO        sodium chloride flush 0.9 % injection 5-40 mL  5-40 mL IntraVENous 2 times per day Benjiman Climes, DO        sodium chloride flush 0.9 % injection 5-40 mL  5-40 mL IntraVENous PRN Benjiman Climes, DO        0.9 % sodium chloride infusion   IntraVENous PRN Benjiman Climes, DO           Allergies:  No Known Allergies    Problem List:    Patient Active Problem List   Diagnosis Code    Arthritis M19.90    Reflux [id number]    Breast cancer ([facility] C50.919    Pneumonia J18.9    CAP (community acquired pneumonia) J18.9    Leukocytosis D72.829    Pulmonary nodule R91.1    Senile osteoporosis M81.0       Past Medical History:        Diagnosis Date    Arthritis     Breast cancer ([facility]     Full dentures     GERD (gastroesophageal reflux disease)     Hypertension     Pneumonia     PONV (postoperative nausea and vomiting)     Pulmonary emboli (HCC)     Reflux     Wears glasses        Past Surgical History:        Procedure Laterality Date    BACK SURGERY      BREAST BIOPSY      CHOLECYSTECTOMY  4-    COLOSTOMY      FRACTURE SURGERY      right wrist    HYSTERECTOMY (CERVIX STATUS UNKNOWN)      JOINT REPLACEMENT  2012    knee    KNEE SURGERY      LYMPH NODE DISSECTION      MASTECTOMY      bilateral       Social History:    Social History     Tobacco Use    Smoking status: Never Smoker    Smokeless tobacco: Never Used   Substance Use Topics    Alcohol use:  Yes     Alcohol/week: 7.0 standard drinks     Types: 7 Glasses of wine per week     Comment: glass of wine nightly Counseling given: Not Answered      Vital Signs (Current):   Vitals:    06/14/22 0948 06/15/22 1220   BP:  126/76   Pulse:  72   Resp:  16   Temp:  98.4 °F (36.9 °C)   TempSrc:  Temporal   SpO2:  96%   Weight: 160 lb (72.6 kg) 159 lb 3.2 oz (72.2 kg)   Height: 5' 6\" (1.676 m) 5' 6\" (1.676 m)                                              BP Readings from Last 3 Encounters:   06/15/22 126/76   06/15/21 (!) 150/77   06/26/19 134/78       NPO Status: Time of last liquid consumption: 2300                        Time of last solid consumption: 2300                                                      BMI:   Wt Readings from Last 3 Encounters:   06/15/22 159 lb 3.2 oz (72.2 kg)   06/15/21 167 lb (75.8 kg)   06/26/19 165 lb (74.8 kg)     Body mass index is 25.7 kg/m². CBC:   Lab Results   Component Value Date    WBC 5.3 06/15/2021    RBC 3.82 06/15/2021    RBC 3.89 08/07/2015    HGB 12.6 06/15/2021    HCT 37.1 06/15/2021    MCV 97.0 06/15/2021    RDW 13.6 06/15/2021     06/15/2021       CMP:   Lab Results   Component Value Date     06/15/2021    K 3.9 06/15/2021    CL 99 06/15/2021    CO2 29 06/15/2021    BUN 15 06/15/2021    CREATININE 0.6 06/15/2021    GFRAA >60 06/15/2021    GFRAA 58 05/22/2013    AGRATIO 1.4 06/15/2021    LABGLOM >60 06/15/2021    GLUCOSE 99 06/15/2021    GLUCOSE 104 08/07/2015    PROT 7.1 06/15/2021    PROT 7.2 08/07/2015    CALCIUM 9.5 06/15/2021    BILITOT 0.7 06/15/2021    ALKPHOS 77 06/15/2021    AST 19 06/15/2021    ALT 10 06/15/2021       POC Tests: No results for input(s): POCGLU, POCNA, POCK, POCCL, POCBUN, POCHEMO, POCHCT in the last 72 hours.     Coags:   Lab Results   Component Value Date    PROTIME 11.8 06/15/2021    INR 1.02 06/15/2021       HCG (If Applicable): No results found for: PREGTESTUR, PREGSERUM, HCG, HCGQUANT     ABGs: No results found for: PHART, PO2ART, OOK0XMJ, HVX1BHF, BEART, G2WJWTLW     Type & Screen (If Applicable):  No results found for: LABABO, [address]    Drug/Infectious Status (If Applicable):  No results found for: HIV, HEPCAB    COVID-19 Screening (If Applicable): No results found for: COVID19        Anesthesia Evaluation  Patient summary reviewed and Nursing notes reviewed   history of anesthetic complications: PONV. Airway: Mallampati: II  TM distance: >3 FB   Neck ROM: full  Mouth opening: > = 3 FB   Dental:    (+) upper dentures and lower dentures      Pulmonary: breath sounds clear to auscultation      (-) not a current smoker (never)                           Cardiovascular:  Exercise tolerance: good (>4 METS),   (+) hypertension: moderate,     (-) past MI    NYHA Classification: II    Rhythm: regular  Rate: normal           Beta Blocker:  Not on Beta Blocker         Neuro/Psych:   Negative Neuro/Psych ROS              GI/Hepatic/Renal:   (+) GERD: well controlled,           Endo/Other:    (+) malignancy/cancer (left mastectomy 15 yrs ago: had chemo  now with new axillary node left ). Abdominal:             Vascular:   + PE.  - DVT. Other Findings:           Anesthesia Plan      general     ASA 3       Induction: intravenous. MIPS: Postoperative opioids intended and Prophylactic antiemetics administered. Anesthetic plan and risks discussed with patient. Plan discussed with CRNA.     Attending anesthesiologist reviewed and agrees with Preprocedure content                Milagro Hooks DO   6/15/2022

## 2022-06-15 NOTE — ANESTHESIA POSTPROCEDURE EVALUATION
Department of Anesthesiology  Postprocedure Note    Patient: Brian Lowery  MRN: [id number]  YOB: 1945  Date of evaluation: 6/15/2022  Time:  4:23 PM     Procedure Summary     Date: 06/15/22 Room / Location: [address] [place] [address]    Anesthesia Start: 4629 Anesthesia Stop: 4971    Procedure: EXCISION OF LEFT AXILLARY NODE (Left ) Diagnosis:       Mass of axillary tail of left breast      Personal history of malignant neoplasm of breast      (Mass of axillary tail of left breast [N63.32])      (Personal history of malignant neoplasm of breast [Z85.3])    Surgeons: Maren Menjivar MD Responsible Provider: Margarito Preston DO    Anesthesia Type: general ASA Status: 3          Anesthesia Type: No value filed. Rio Phase I: Rio Score: 6    Rio Phase II:      Last vitals: Reviewed and per EMR flowsheets.        Anesthesia Post Evaluation    Patient location during evaluation: PACU  Patient participation: complete - patient participated  Level of consciousness: awake and alert  Pain score: 0  Airway patency: patent  Nausea & Vomiting: no nausea and no vomiting  Complications: no  Cardiovascular status: hemodynamically stable  Respiratory status: acceptable  Hydration status: stable

## 2022-06-15 NOTE — H&P
Wili Cortes    [id number]    [facility] Same Day Surgery Update H & P  Department of General Surgery   Surgical Service   Pre-operative History and Physical  Last H & P within the last 30 days. DIAGNOSIS:   Mass of axillary tail of left breast [N63.32]  Personal history of malignant neoplasm of breast [Z85.3]    Procedure(s):  EXCISION OF LEFT AXILLARY NODE    History obtained from: Patient interview and EHR      HISTORY OF PRESENT ILLNESS:   The patient is a 68 y.o. female with hx of left breast cancer was found to have left axillary lymph node enlargement. Biopsy demonstrated invasive carcinoma and the patient presents today for the above procedure. Illness Screening: Patient denies fever, chills, worsening cough, or close contact with sick individuals. Past Medical History:        Diagnosis Date    Arthritis     Breast cancer ([address]     Full dentures     GERD (gastroesophageal reflux disease)     Hypertension     Pneumonia     PONV (postoperative nausea and vomiting)     Pulmonary emboli (HCC)     Reflux     Wears glasses      Past Surgical History:        Procedure Laterality Date    BACK SURGERY      BREAST BIOPSY      CHOLECYSTECTOMY  4-    COLOSTOMY      FRACTURE SURGERY      right wrist    HYSTERECTOMY (CERVIX STATUS UNKNOWN)      JOINT REPLACEMENT  2012    knee    KNEE SURGERY      LYMPH NODE DISSECTION      MASTECTOMY      bilateral       Medications Prior to Admission:      Prior to Admission medications    Medication Sig Start Date End Date Taking?  Authorizing Provider   omeprazole (PRILOSEC) 20 MG delayed release capsule TAKE 1 CAPSULE DAILY 3/2/22  Yes Historical Provider, MD   cyanocobalamin 1000 MCG tablet TAKE 1 TABLET DAILY 11/11/21  Yes Historical Provider, MD   denosumab (PROLIA) 60 MG/ML SOSY SC injection Inject 60 mg into the skin 10/29/21  Yes Historical Provider, MD   hydroCHLOROthiazide (HYDRODIURIL) 25 MG tablet Take by mouth daily Pt not sure of dose   Yes Historical Provider, MD   ALPRAZolam Alana [address]) 1 MG tablet Take 1 mg by mouth nightly. Historical Provider, MD   alosetron (LOTRONEX) 0.5 MG tablet Take 0.5 mg by mouth 4/10/18   Historical Provider, MD   ondansetron (ZOFRAN ODT) 4 MG disintegrating tablet Take 1 tablet by mouth every 8 hours as needed for Nausea 3/26/18   JOSE Driscoll - CNP   LORazepam (ATIVAN) 1 MG tablet Take 1 tablet by mouth every 8 hours as needed  Patient not taking: Reported on 6/14/2022 8/25/15   Melodie Rowland DO   escitalopram (LEXAPRO) 10 MG tablet Take 10 mg by mouth daily    Historical Provider, MD   Probiotic Product ([address]) Take  by mouth. Patient not taking: Reported on 6/14/2022    Historical Provider, MD   celecoxib (CELEBREX) 200 MG capsule Take 200 mg by mouth daily. Historical Provider, MD   esomeprazole (NEXIUM) 40 MG capsule Take 40 mg by mouth every morning (before breakfast). Patient not taking: Reported on 6/14/2022    Historical Provider, MD   gabapentin (NEURONTIN) 300 MG capsule Take 300 mg by mouth 2 times daily. 2 tabs twice a day   Patient not taking: Reported on 6/14/2022 4/6/11   Historical Provider, MD         Allergies:  Patient has no known allergies. PHYSICAL EXAM:      /76   Pulse 72   Temp 98.4 °F (36.9 °C) (Temporal)   Resp 16   Ht 5' 6\" (1.676 m)   Wt 159 lb 3.2 oz (72.2 kg)   SpO2 96%   BMI 25.70 kg/m²      Airway:  Airway patent with no audible stridor    Heart:  Regular rate and rhythm, No murmur noted    Lungs:  No increased work of breathing, good air exchange, clear to auscultation bilaterally, no crackles or wheezing    Abdomen:  Soft, non-distended, non-tender, no rebound tenderness or guarding, and no masses palpated    ASSESSMENT AND PLAN     Patient is a 68 y.o. female with above specified procedure planned. 1.  The patients history and physical was obtained and signed off by the pre-admission testing department. Patient seen and focused exam done today- no new changes since last physical exam on 6/1/22    2. Access to ancillary services are available per request of the provider.     JOSE Streeter - CNP     6/15/2022

## 2022-06-15 NOTE — PROGRESS NOTES
PACU Transfer to [facility]    Vitals:    06/15/22 1620   BP: (!) 164/86   Pulse: 74   Resp: 19   Temp:    SpO2: 97%         Intake/Output Summary (Last 24 hours) at 6/15/2022 1628  Last data filed at 6/15/2022 1559  Gross per 24 hour   Intake 60 ml   Output --   Net 60 ml       Pain assessment:  none  Pain Level: 0    Patient hemodynamically stable at this time, denies any pain and voided x 1. Belongings at the bedside.      6/15/2022 4:28 PM

## 2022-06-23 NOTE — OP NOTE
[facility] [address], [address]                                OPERATIVE REPORT    PATIENT NAME: Belle Ibarra                :        1945  MED REC NO:   [id number]                          ROOM:  ACCOUNT NO:   [de-identified]                           ADMIT DATE: 06/15/2022  PROVIDER:     Saige Georges MD    DATE OF PROCEDURE:  06/15/2022    PREOPERATIVE DIAGNOSES:  1. Recurrent left breast cancer involving axillary lymph node. 2.  History of left breast cancer. POSTOPERATIVE DIAGNOSES:  1. Recurrent left breast cancer involving axillary lymph node. 2.  History of left breast cancer. PROCEDURE:  Left deep axillary lymph node biopsy. ATTENDING SURGEON:  Saige Georges MD    ASSISTANT:  Nataliya Elkins. ESTIMATED BLOOD LOSS:  Less than 50 mL. ANESTHESIA:  General plus 30 mL 1% Xylocaine. SPECIMEN:  Left axillary node. INDICATIONS FOR PROCEDURE:  The patient is a 72-year-old female with a  history of left breast cancer diagnosed approximately 11 years ago. At  that time, she presented with an axillary presentation of the tumor. She underwent neoadjuvant chemotherapy. She has had bilateral  mastectomies as well as some breast radiation. She was recently found  to have a new mass in the left axilla. Biopsy of this lesion was  positive for metastatic breast cancer. She presents today for removal  of this node. DESCRIPTION OF PROCEDURE:  The patient was brought to the operating  room, placed on the OR table in supine position. Following induction of  general anesthesia, the patient's left axilla was prepped with Betadine  and draped in the usual sterile manner. An incision was made in the  lower axilla overlying this nearly 5.5 cm mass. Dissection was carried  down through the subcutaneous tissue until the clavipectoral fascia was  identified.   Upon opening the clavipectoral fascia, dissection was  carried superiorly in the axilla until the superior margin of this node  was identified. This tissue was grasped and dissection was carried from  a superior to inferior direction identifying all vasculature and nerves  within the area. Care was taken to carefully dissect out this enlarged  lymph node. Vascular and lymphatic channels were clipped as they were  encountered. No further adenopathy was identified within the axilla. The specimen was submitted pathology for permanent section. Hemostasis  was obtained with Bovie cautery. The subcutaneous tissue was closed  with interrupted 3-0 Vicryl suture and the skin was closed with 4-0  Vicryl subcuticular stitch. Benzoin, Steri's, dry sterile gauze and  Tegaderm dressing were applied. All needle and sponge counts were  correct. The patient was returned to Recovery in good condition and I  was present for the entire procedure.         Justin Hardin MD    D: 06/22/2022 21:00:12       T: 06/22/2022 21:02:50     [email or web address]  Job#: [id number]     Doc#: [id number]    CC:  MD Ramesh Shen MD

## 2022-07-24 ENCOUNTER — APPOINTMENT (OUTPATIENT)
Dept: CT IMAGING | Age: 77
End: 2022-07-24
Payer: MEDICARE

## 2022-07-24 ENCOUNTER — HOSPITAL ENCOUNTER (EMERGENCY)
Age: 77
Discharge: ANOTHER HOSPITAL | End: 2022-07-24
Attending: EMERGENCY MEDICINE
Payer: MEDICARE

## 2022-07-24 VITALS
DIASTOLIC BLOOD PRESSURE: 82 MMHG | TEMPERATURE: 97 F | RESPIRATION RATE: 19 BRPM | BODY MASS INDEX: 24.64 KG/M2 | HEIGHT: 67 IN | HEART RATE: 79 BPM | SYSTOLIC BLOOD PRESSURE: 122 MMHG | OXYGEN SATURATION: 97 % | WEIGHT: 157 LBS

## 2022-07-24 DIAGNOSIS — L02.412 ABSCESS OF AXILLA, LEFT: Primary | ICD-10-CM

## 2022-07-24 DIAGNOSIS — I26.93 SINGLE SUBSEGMENTAL PULMONARY EMBOLISM WITHOUT ACUTE COR PULMONALE (HCC): ICD-10-CM

## 2022-07-24 LAB
A/G RATIO: 1.7 (ref 1.1–2.2)
ALBUMIN SERPL-MCNC: 3.8 G/DL (ref 3.4–5)
ALP BLD-CCNC: 99 U/L (ref 40–129)
ALT SERPL-CCNC: 46 U/L (ref 10–40)
ANION GAP SERPL CALCULATED.3IONS-SCNC: 11 MMOL/L (ref 3–16)
APTT: 24 SEC (ref 23–34.3)
AST SERPL-CCNC: 44 U/L (ref 15–37)
BASOPHILS ABSOLUTE: 0 K/UL (ref 0–0.2)
BASOPHILS RELATIVE PERCENT: 0.4 %
BILIRUB SERPL-MCNC: 0.5 MG/DL (ref 0–1)
BILIRUBIN URINE: NEGATIVE
BLOOD, URINE: NEGATIVE
BUN BLDV-MCNC: 19 MG/DL (ref 7–20)
CALCIUM SERPL-MCNC: 8.7 MG/DL (ref 8.3–10.6)
CHLORIDE BLD-SCNC: 89 MMOL/L (ref 99–110)
CLARITY: CLEAR
CO2: 29 MMOL/L (ref 21–32)
COLOR: YELLOW
CREAT SERPL-MCNC: 0.7 MG/DL (ref 0.6–1.2)
EOSINOPHILS ABSOLUTE: 0.1 K/UL (ref 0–0.6)
EOSINOPHILS RELATIVE PERCENT: 1.4 %
GFR AFRICAN AMERICAN: >60
GFR NON-AFRICAN AMERICAN: >60
GLUCOSE BLD-MCNC: 94 MG/DL (ref 70–99)
GLUCOSE URINE: NEGATIVE MG/DL
HCT VFR BLD CALC: 29.2 % (ref 36–48)
HEMOGLOBIN: 10.4 G/DL (ref 12–16)
INFLUENZA A: NOT DETECTED
INFLUENZA B: NOT DETECTED
KETONES, URINE: NEGATIVE MG/DL
LEUKOCYTE ESTERASE, URINE: NEGATIVE
LYMPHOCYTES ABSOLUTE: 1 K/UL (ref 1–5.1)
LYMPHOCYTES RELATIVE PERCENT: 22.6 %
MCH RBC QN AUTO: 33.9 PG (ref 26–34)
MCHC RBC AUTO-ENTMCNC: 35.5 G/DL (ref 31–36)
MCV RBC AUTO: 95.6 FL (ref 80–100)
MICROSCOPIC EXAMINATION: NORMAL
MONOCYTES ABSOLUTE: 0.3 K/UL (ref 0–1.3)
MONOCYTES RELATIVE PERCENT: 5.8 %
NEUTROPHILS ABSOLUTE: 3 K/UL (ref 1.7–7.7)
NEUTROPHILS RELATIVE PERCENT: 69.8 %
NITRITE, URINE: NEGATIVE
PDW BLD-RTO: 15.5 % (ref 12.4–15.4)
PH UA: 6.5 (ref 5–8)
PLATELET # BLD: 227 K/UL (ref 135–450)
PMV BLD AUTO: 6.5 FL (ref 5–10.5)
POTASSIUM REFLEX MAGNESIUM: 3.7 MMOL/L (ref 3.5–5.1)
PROCALCITONIN: 0.04 NG/ML (ref 0–0.15)
PROTEIN UA: NEGATIVE MG/DL
RBC # BLD: 3.05 M/UL (ref 4–5.2)
SARS-COV-2 RNA, RT PCR: NOT DETECTED
SODIUM BLD-SCNC: 129 MMOL/L (ref 136–145)
SPECIFIC GRAVITY UA: <=1.005 (ref 1–1.03)
TOTAL PROTEIN: 6.1 G/DL (ref 6.4–8.2)
URINE REFLEX TO CULTURE: NORMAL
URINE TYPE: NORMAL
UROBILINOGEN, URINE: 0.2 E.U./DL
WBC # BLD: 4.3 K/UL (ref 4–11)

## 2022-07-24 PROCEDURE — 87636 SARSCOV2 & INF A&B AMP PRB: CPT

## 2022-07-24 PROCEDURE — 96366 THER/PROPH/DIAG IV INF ADDON: CPT

## 2022-07-24 PROCEDURE — 96365 THER/PROPH/DIAG IV INF INIT: CPT

## 2022-07-24 PROCEDURE — 87040 BLOOD CULTURE FOR BACTERIA: CPT

## 2022-07-24 PROCEDURE — 6360000004 HC RX CONTRAST MEDICATION: Performed by: EMERGENCY MEDICINE

## 2022-07-24 PROCEDURE — 85730 THROMBOPLASTIN TIME PARTIAL: CPT

## 2022-07-24 PROCEDURE — 71260 CT THORAX DX C+: CPT

## 2022-07-24 PROCEDURE — 36415 COLL VENOUS BLD VENIPUNCTURE: CPT

## 2022-07-24 PROCEDURE — 81003 URINALYSIS AUTO W/O SCOPE: CPT

## 2022-07-24 PROCEDURE — 80053 COMPREHEN METABOLIC PANEL: CPT

## 2022-07-24 PROCEDURE — 84145 PROCALCITONIN (PCT): CPT

## 2022-07-24 PROCEDURE — 96368 THER/DIAG CONCURRENT INF: CPT

## 2022-07-24 PROCEDURE — 96376 TX/PRO/DX INJ SAME DRUG ADON: CPT

## 2022-07-24 PROCEDURE — 2580000003 HC RX 258: Performed by: EMERGENCY MEDICINE

## 2022-07-24 PROCEDURE — 6360000002 HC RX W HCPCS: Performed by: EMERGENCY MEDICINE

## 2022-07-24 PROCEDURE — 99285 EMERGENCY DEPT VISIT HI MDM: CPT

## 2022-07-24 PROCEDURE — 85025 COMPLETE CBC W/AUTO DIFF WBC: CPT

## 2022-07-24 RX ORDER — HEPARIN SODIUM 1000 [USP'U]/ML
5200 INJECTION, SOLUTION INTRAVENOUS; SUBCUTANEOUS ONCE
Status: COMPLETED | OUTPATIENT
Start: 2022-07-24 | End: 2022-07-24

## 2022-07-24 RX ORDER — 0.9 % SODIUM CHLORIDE 0.9 %
1000 INTRAVENOUS SOLUTION INTRAVENOUS ONCE
Status: COMPLETED | OUTPATIENT
Start: 2022-07-24 | End: 2022-07-24

## 2022-07-24 RX ORDER — HEPARIN SODIUM 1000 [USP'U]/ML
5200 INJECTION, SOLUTION INTRAVENOUS; SUBCUTANEOUS PRN
Status: DISCONTINUED | OUTPATIENT
Start: 2022-07-25 | End: 2022-07-25 | Stop reason: HOSPADM

## 2022-07-24 RX ORDER — HEPARIN SODIUM 1000 [USP'U]/ML
2600 INJECTION, SOLUTION INTRAVENOUS; SUBCUTANEOUS PRN
Status: DISCONTINUED | OUTPATIENT
Start: 2022-07-25 | End: 2022-07-25 | Stop reason: HOSPADM

## 2022-07-24 RX ORDER — HEPARIN SODIUM 10000 [USP'U]/100ML
1170 INJECTION, SOLUTION INTRAVENOUS CONTINUOUS
Status: DISCONTINUED | OUTPATIENT
Start: 2022-07-24 | End: 2022-07-25 | Stop reason: HOSPADM

## 2022-07-24 RX ADMIN — IOPAMIDOL 75 ML: 755 INJECTION, SOLUTION INTRAVENOUS at 19:35

## 2022-07-24 RX ADMIN — SODIUM CHLORIDE 1000 ML: 9 INJECTION, SOLUTION INTRAVENOUS at 19:03

## 2022-07-24 RX ADMIN — HEPARIN SODIUM 5200 UNITS: 1000 INJECTION INTRAVENOUS; SUBCUTANEOUS at 21:01

## 2022-07-24 RX ADMIN — HEPARIN SODIUM 1170 UNITS/HR: 10000 INJECTION, SOLUTION INTRAVENOUS at 21:07

## 2022-07-24 RX ADMIN — CEFEPIME 2000 MG: 2 INJECTION, POWDER, FOR SOLUTION INTRAVENOUS at 20:59

## 2022-07-24 RX ADMIN — VANCOMYCIN HYDROCHLORIDE 1250 MG: 10 INJECTION, POWDER, LYOPHILIZED, FOR SOLUTION INTRAVENOUS at 21:05

## 2022-07-24 NOTE — ED NOTES
Patient reported she was told to go to the ED by her family doctor for fluids per her daughter who called the PCP via phone. Patient reported she does feel right. Has not been able to eat or drink well today. Ethel Bermudez RN  07/24/22 6166    Patient was at the [facility] ED for a UTI, she was sent home with Antibiotics and Pain pills. Patient felt she was doing better until last night when she raised her left arm up and a bunch of fluid came down her arm. \"I don't know. \"       Ethel Bermudez RN  07/24/22 8373

## 2022-07-24 NOTE — ED NOTES
FOLLOW UP VISIT      September 16, 2020    Patient Name: MIKEY MACEDO  MRN:  [id number]  YOB: 1954  Diagnosis: Other intra-abdominal and pelvic swelling, mass and lump, R19.09      Current status:  I conducted a two way audio video zoom visit for toxicity check today. She agreed to the visit and was joined by her wife. She notes she is pleasantly surprised with how well she did. She had facial flushing for 24 hours post infusion and difficulty sleeping the first night. She has ongoing constipation and continues to take Metamucil, miralax, and Smooth move tea. She denies any fevers, chills, dyspnea, cough, emesis, nausea, peripheral neuropathy, or pain. She continues to be active with a good appetite.     NARRATIVE:  The patient is a 66-year-old female who initially presented to [facility] 6/29/2020 with abdominal pain and shortness of breath. She was found to have pneumonia, empyema, requiring thoracentesis and chest tube placement. As a somewhat incidental finding she wasnoted to have a complex adnexal mass and outpatient follow-up was recommendedPatient was referred to Dr. Mittal, and on 7/30/2020 underwent laparoscopic left salpingo-oophorectomy, ex lap, total abdominal surgery, bilateral pelvic and periaortic lymphadenectomy, omentectomy and bilateral utero lysis.  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  Uterus, cervix, omentum, pelvic and periaortic lymph nodes were all negative for malignancy.  Nodule scraped  from sigmoid colon serosa was found to be positive, rendering her stage II. Preoperative CA-19-9 2641, Ca1 2572, CEA 2.3  PAST MEDICAL HISTORY:  Hyperlipidemia, hypertension, irritable bowel syndrome.  Basal cell carcinoma right cheek, Arthritis  PAST SURGICAL HISTORY:  Status post Exploratory laparotomy for endometriosis status post right oophorectomy 2008.  Tonsillectomy, hysteroscopic polypectomy 2006, wrist surgery 2017    OBSTETRIC/GYNECOLOGIC  After the daughter arrived, she directed us to assess the left axillary for fluid which had ran out the other night as the patient had previously mentioned. Patient's left axillary is tender, swollen and red.      Jarret Espinoza RN  07/24/22 1922 HISTORY:   Menarche 14.  0, para 0; LMP:   Previously used oral contraceptives for 10 to 20 years secondary to endometriosis.No prior hormone replacement therapy use.     CURRENT MEDICATIONS:  Medications, doses, and frequency reviewed with documentation in the electronic medical record.    ALLERGIES:  No known drug allergy  FAMILY HISTORY:  Her mother had breast cancerDiagnosed at age 33, maternal grandfather had esophageal cancer.  Maternal uncle had leukemia  SOCIAL HISTORY:   Patient  for 6 years, with same-sex partner for 33 years.  Retired in 2018, prior  for scholarly work.  Does not use tobacco or recreational drugs.  Social drinker    PHYSICAL EXAMINATION: ECOG performance status 0  Limited due to telehealth.   Constitutional: Alert, cooperative.  Mood and affect appropriate. Appears close to chronological age. Well developed. Well nourished.   Head and face: Normocephalic, no scars.   Eyes:  Pupils equal and reactive; conjunctivae clear. Sclerae anicteric.    Psychiatric: Oriented to time, place, and person.  Coherent speech, Verbalizes understanding of our discussions today.     LABORATORY: No labs today.   PATHOLOGY:  Client: [facility]  Submitting Physician: Vida Ponce MD  Date Specimen Collected: 20 Accession #: [id number]  Date Specimen Received: 20  Date Reported: 2020 17:08 Location: [facility]  * Addendum Present *  ______________________________________________________________________________  Pathologic Diagnosis :  A: Fs-left adnexal mass:  - Endometrioid adenocarcinoma with extensive squamous and mucinous  differentiation.  B: Fs-left adnexal mass portion 2:  - Endometrioid adenocarcinoma with extensive squamous and mucinous  differentiation  C: Uterus, cervix:  - Endocervix with nabothian cysts.  - Atrophic endometrium.  - Adenomyosis and leiomyomata.  D: Omentum:  - Omentum. Negative for malignancy.  E: Left pelvic  lymph node:  - 10 lymph nodes negative for malignancy  F: Left periaortic lymph node:  - 14 lymph nodes negative for malignancy.  G: Right pelvic lymph node:  - 11 lymph nodes negative for malignancy.  H: Right round ligament:  - Round ligament. Negative for malignancy.  I: Right periaortic lymph node:  - Six lymph nodes negative for malignancy.  J: Sigmoid colon nodule:  - Metastatic adenocarcinoma.  - Endometriosis.  Diagnosis Comment:  OVARY or FALLOPIAN TUBE or PRIMARY PERITONEUM  SPECIMEN  Procedure: Total hysterectomy and bilateral salpingo-oophorectomy,  Omentectomy, Peritoneal biopsies, Peritoneal washing  Specimen Integrity of Left Ovary: Fragmented  TUMOR  Tumor Site: Left ovary  Histologic Type:  Endometrioid carcinoma  Histologic Type Comments: Extensive squamous and mucinous differentiation  Histologic Grade: G2: Moderately differentiated  Tumor Size: Cannot be determined: fragmented  Ovarian Surface Involvement: Present  Fallopian Tube Surface Involvement: Absent  Other Tissue / Organ Involvement: Abdominal peritoneum  Site: sigmoid serosa  Peritoneal Ascitic Fluid: Malignant (positive for malignancy)  Treatment Effect: No known presurgical therapy  LYMPH NODES  Regional Lymph Nodes: All lymph nodes negative for tumor cells  Number of Lymph Nodes Examined: 41  PATHOLOGIC STAGE CLASSIFICATION (pTNM, AJCC 8th Edition)  Primary Tumor (pT): pT2b: Extension to and / or implants on other  pelvic tissues  Regional Lymph Nodes (pN): pN0: No regional lymph node metastasisGreg Nino M.D.  ** Electronic Signature (ZM) 8/11/2020 17:08 **  ______________________________________________________________________________  RADIOLOGY:    6/28/2020: CT abdomen pelvis with IV contrast: Complex pelvic mass.  Inseparable from the uterus, measuring 7.9 x 6.7 cm.  Septations and cystic components raises suspicion for ovarian neoplasm. Abdominal and pelvic ascites.  Right pleural effusion and basilar  infiltrates.  7/6/2020: CT chest without IV contrast: Left-sided pigtail pleural catheter is been placed since prior exam.  Right pleural effusion is nearly completely resolved with minimal remainingIn the right pneumothorax.  There is no pneumothorax.  Currently improved aeration of the right lung with minimal atelectasis in the right lung base.  Improved aeration of the left lung with decreased atelectasis since the prior exam.  ASSESSMENT AND PLAN:   The patient is a 66 -year-old woman with Endometrioid adenocarcinoma of the left ovary  1. 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 She will return to clinic in 2 weeks for evaluation prior to cycle 2.   2. Nausea prophylaxis: Antiemetics as needed.  3. History of irritable bowel and constipation: Patient reports she is on a significant bowel regimen already.  She is concerned for the possible risk of constipation with Zofran. She has a prescription for Reglan as a second PRN antiemetic which also has a promotility effect. She did not use it with cycle 1.  4. Central access: Port placed and functioning well.  5. Genetic risk: Significant family history of malignancy.  Did meet with genetic counseling, but has not yet undergone testing.  She is willing to do so.  She would like to have this done at the time of another lab draw to minimize additional sticks. We will also request HRD testing on original tumor  6. Nutrition: Patient is a vegetarian, and had many questions regarding optimizing her nutrition.  Will have  referred her to nutrition for further counseling  7. Polypharmacy. She had a telehealth visit with pharmacy yesterday and has stopped several herbal medications due to possible interactions.   8. Psychosocial: She feels she is adjusting well and is well supported.  Counseling services with psychosocial support have been offered.     PLANNED FOLLOW UP:  2  weeks           Hematology/Oncology  [facility]  [facility]    cc: Aleah Paul M.D.

## 2022-07-24 NOTE — ED TRIAGE NOTES
Chief Complaint   Patient presents with    Altered Mental Status     Pt is being treated for uti. Pt states this morning she was a bit confused. Concerned for dehydration.   Pt also states she has some drainage from incision site under left arm

## 2022-07-25 ENCOUNTER — HOSPITAL ENCOUNTER (INPATIENT)
Age: 77
LOS: 2 days | Discharge: HOME HEALTH | DRG: 862 | End: 2022-07-27
Attending: INTERNAL MEDICINE | Admitting: INTERNAL MEDICINE
Payer: MEDICARE

## 2022-07-25 PROBLEM — L02.91 ABSCESS: Status: ACTIVE | Noted: 2022-07-25

## 2022-07-25 PROBLEM — L02.412 ABSCESS OF AXILLA, LEFT: Status: ACTIVE | Noted: 2022-07-25

## 2022-07-25 LAB
ALBUMIN SERPL-MCNC: 3.3 G/DL (ref 3.4–5)
ANION GAP SERPL CALCULATED.3IONS-SCNC: 8 MMOL/L (ref 3–16)
ANTI-XA UNFRAC HEPARIN: 0.32 IU/ML (ref 0.3–0.7)
ANTI-XA UNFRAC HEPARIN: 0.48 IU/ML (ref 0.3–0.7)
BASOPHILS ABSOLUTE: 0 K/UL (ref 0–0.2)
BASOPHILS RELATIVE PERCENT: 0.7 %
BUN BLDV-MCNC: 12 MG/DL (ref 7–20)
CALCIUM SERPL-MCNC: 8.3 MG/DL (ref 8.3–10.6)
CHLORIDE BLD-SCNC: 91 MMOL/L (ref 99–110)
CO2: 30 MMOL/L (ref 21–32)
CREAT SERPL-MCNC: 0.7 MG/DL (ref 0.6–1.2)
EOSINOPHILS ABSOLUTE: 0.1 K/UL (ref 0–0.6)
EOSINOPHILS RELATIVE PERCENT: 1.6 %
GFR AFRICAN AMERICAN: >60
GFR NON-AFRICAN AMERICAN: >60
GLUCOSE BLD-MCNC: 184 MG/DL (ref 70–99)
HCT VFR BLD CALC: 30.3 % (ref 36–48)
HEMOGLOBIN: 10.3 G/DL (ref 12–16)
LYMPHOCYTES ABSOLUTE: 0.8 K/UL (ref 1–5.1)
LYMPHOCYTES RELATIVE PERCENT: 20.1 %
MAGNESIUM: 1.7 MG/DL (ref 1.8–2.4)
MCH RBC QN AUTO: 32.8 PG (ref 26–34)
MCHC RBC AUTO-ENTMCNC: 34.1 G/DL (ref 31–36)
MCV RBC AUTO: 96.1 FL (ref 80–100)
MONOCYTES ABSOLUTE: 0.2 K/UL (ref 0–1.3)
MONOCYTES RELATIVE PERCENT: 4.3 %
NEUTROPHILS ABSOLUTE: 3.1 K/UL (ref 1.7–7.7)
NEUTROPHILS RELATIVE PERCENT: 73.3 %
PDW BLD-RTO: 15.4 % (ref 12.4–15.4)
PHOSPHORUS: 1.9 MG/DL (ref 2.5–4.9)
PLATELET # BLD: 206 K/UL (ref 135–450)
PMV BLD AUTO: 7 FL (ref 5–10.5)
POTASSIUM REFLEX MAGNESIUM: 3.1 MMOL/L (ref 3.5–5.1)
POTASSIUM SERPL-SCNC: 3.1 MMOL/L (ref 3.5–5.1)
RBC # BLD: 3.16 M/UL (ref 4–5.2)
SODIUM BLD-SCNC: 129 MMOL/L (ref 136–145)
WBC # BLD: 4.2 K/UL (ref 4–11)

## 2022-07-25 PROCEDURE — 6360000002 HC RX W HCPCS: Performed by: INTERNAL MEDICINE

## 2022-07-25 PROCEDURE — 2580000003 HC RX 258

## 2022-07-25 PROCEDURE — 36415 COLL VENOUS BLD VENIPUNCTURE: CPT

## 2022-07-25 PROCEDURE — 2060000000 HC ICU INTERMEDIATE R&B

## 2022-07-25 PROCEDURE — 83735 ASSAY OF MAGNESIUM: CPT

## 2022-07-25 PROCEDURE — 87070 CULTURE OTHR SPECIMN AEROBIC: CPT

## 2022-07-25 PROCEDURE — 2500000003 HC RX 250 WO HCPCS: Performed by: STUDENT IN AN ORGANIZED HEALTH CARE EDUCATION/TRAINING PROGRAM

## 2022-07-25 PROCEDURE — 6370000000 HC RX 637 (ALT 250 FOR IP): Performed by: INTERNAL MEDICINE

## 2022-07-25 PROCEDURE — 87205 SMEAR GRAM STAIN: CPT

## 2022-07-25 PROCEDURE — 2500000003 HC RX 250 WO HCPCS

## 2022-07-25 PROCEDURE — 87077 CULTURE AEROBIC IDENTIFY: CPT

## 2022-07-25 PROCEDURE — 1200000000 HC SEMI PRIVATE

## 2022-07-25 PROCEDURE — 80069 RENAL FUNCTION PANEL: CPT

## 2022-07-25 PROCEDURE — 87186 SC STD MICRODIL/AGAR DIL: CPT

## 2022-07-25 PROCEDURE — 0X953ZZ DRAINAGE OF LEFT AXILLA, PERCUTANEOUS APPROACH: ICD-10-PCS | Performed by: INTERNAL MEDICINE

## 2022-07-25 PROCEDURE — 2580000003 HC RX 258: Performed by: INTERNAL MEDICINE

## 2022-07-25 PROCEDURE — 85520 HEPARIN ASSAY: CPT

## 2022-07-25 PROCEDURE — 85025 COMPLETE CBC W/AUTO DIFF WBC: CPT

## 2022-07-25 PROCEDURE — 6360000002 HC RX W HCPCS

## 2022-07-25 RX ORDER — ONDANSETRON 4 MG/1
4 TABLET, ORALLY DISINTEGRATING ORAL EVERY 8 HOURS PRN
Status: DISCONTINUED | OUTPATIENT
Start: 2022-07-25 | End: 2022-07-27 | Stop reason: HOSPADM

## 2022-07-25 RX ORDER — OXYCODONE HYDROCHLORIDE 5 MG/1
5 TABLET ORAL EVERY 4 HOURS PRN
Status: ACTIVE | OUTPATIENT
Start: 2022-07-25 | End: 2022-07-26

## 2022-07-25 RX ORDER — LOPERAMIDE HYDROCHLORIDE 2 MG/1
2 CAPSULE ORAL 4 TIMES DAILY PRN
Status: DISCONTINUED | OUTPATIENT
Start: 2022-07-25 | End: 2022-07-27 | Stop reason: HOSPADM

## 2022-07-25 RX ORDER — ONDANSETRON 2 MG/ML
4 INJECTION INTRAMUSCULAR; INTRAVENOUS EVERY 6 HOURS PRN
Status: DISCONTINUED | OUTPATIENT
Start: 2022-07-25 | End: 2022-07-27 | Stop reason: HOSPADM

## 2022-07-25 RX ORDER — SODIUM CHLORIDE 9 MG/ML
INJECTION, SOLUTION INTRAVENOUS PRN
Status: DISCONTINUED | OUTPATIENT
Start: 2022-07-25 | End: 2022-07-27 | Stop reason: HOSPADM

## 2022-07-25 RX ORDER — POLYETHYLENE GLYCOL 3350 17 G/17G
17 POWDER, FOR SOLUTION ORAL DAILY PRN
Status: DISCONTINUED | OUTPATIENT
Start: 2022-07-25 | End: 2022-07-27 | Stop reason: HOSPADM

## 2022-07-25 RX ORDER — HEPARIN SODIUM 1000 [USP'U]/ML
80 INJECTION, SOLUTION INTRAVENOUS; SUBCUTANEOUS PRN
Status: DISCONTINUED | OUTPATIENT
Start: 2022-07-25 | End: 2022-07-26 | Stop reason: ALTCHOICE

## 2022-07-25 RX ORDER — ATORVASTATIN CALCIUM 20 MG/1
20 TABLET, FILM COATED ORAL DAILY
COMMUNITY

## 2022-07-25 RX ORDER — ACETAMINOPHEN 650 MG/1
650 SUPPOSITORY RECTAL EVERY 6 HOURS PRN
Status: DISCONTINUED | OUTPATIENT
Start: 2022-07-25 | End: 2022-07-27 | Stop reason: HOSPADM

## 2022-07-25 RX ORDER — ATORVASTATIN CALCIUM 20 MG/1
20 TABLET, FILM COATED ORAL NIGHTLY
Status: DISCONTINUED | OUTPATIENT
Start: 2022-07-25 | End: 2022-07-27 | Stop reason: HOSPADM

## 2022-07-25 RX ORDER — SODIUM CHLORIDE 0.9 % (FLUSH) 0.9 %
5-40 SYRINGE (ML) INJECTION PRN
Status: DISCONTINUED | OUTPATIENT
Start: 2022-07-25 | End: 2022-07-27 | Stop reason: HOSPADM

## 2022-07-25 RX ORDER — ALPRAZOLAM 0.5 MG/1
1 TABLET ORAL NIGHTLY
Status: DISCONTINUED | OUTPATIENT
Start: 2022-07-25 | End: 2022-07-27 | Stop reason: HOSPADM

## 2022-07-25 RX ORDER — SODIUM CHLORIDE, SODIUM LACTATE, POTASSIUM CHLORIDE, CALCIUM CHLORIDE 600; 310; 30; 20 MG/100ML; MG/100ML; MG/100ML; MG/100ML
INJECTION, SOLUTION INTRAVENOUS CONTINUOUS
Status: DISCONTINUED | OUTPATIENT
Start: 2022-07-25 | End: 2022-07-27 | Stop reason: HOSPADM

## 2022-07-25 RX ORDER — ACETAMINOPHEN 325 MG/1
650 TABLET ORAL EVERY 6 HOURS PRN
Status: DISCONTINUED | OUTPATIENT
Start: 2022-07-25 | End: 2022-07-27 | Stop reason: HOSPADM

## 2022-07-25 RX ORDER — SODIUM CHLORIDE 0.9 % (FLUSH) 0.9 %
5-40 SYRINGE (ML) INJECTION EVERY 12 HOURS SCHEDULED
Status: DISCONTINUED | OUTPATIENT
Start: 2022-07-25 | End: 2022-07-27 | Stop reason: HOSPADM

## 2022-07-25 RX ORDER — ESCITALOPRAM OXALATE 20 MG/1
20 TABLET ORAL DAILY
Status: DISCONTINUED | OUTPATIENT
Start: 2022-07-25 | End: 2022-07-27 | Stop reason: HOSPADM

## 2022-07-25 RX ORDER — MAGNESIUM SULFATE IN WATER 40 MG/ML
2000 INJECTION, SOLUTION INTRAVENOUS ONCE
Status: COMPLETED | OUTPATIENT
Start: 2022-07-25 | End: 2022-07-25

## 2022-07-25 RX ORDER — LIDOCAINE HYDROCHLORIDE AND EPINEPHRINE 10; 10 MG/ML; UG/ML
20 INJECTION, SOLUTION INFILTRATION; PERINEURAL ONCE
Status: COMPLETED | OUTPATIENT
Start: 2022-07-25 | End: 2022-07-25

## 2022-07-25 RX ORDER — HEPARIN SODIUM 10000 [USP'U]/100ML
5-30 INJECTION, SOLUTION INTRAVENOUS CONTINUOUS
Status: DISCONTINUED | OUTPATIENT
Start: 2022-07-25 | End: 2022-07-26

## 2022-07-25 RX ORDER — HEPARIN SODIUM 1000 [USP'U]/ML
40 INJECTION, SOLUTION INTRAVENOUS; SUBCUTANEOUS PRN
Status: DISCONTINUED | OUTPATIENT
Start: 2022-07-25 | End: 2022-07-26 | Stop reason: ALTCHOICE

## 2022-07-25 RX ADMIN — SODIUM CHLORIDE, PRESERVATIVE FREE 10 ML: 5 INJECTION INTRAVENOUS at 08:09

## 2022-07-25 RX ADMIN — ESCITALOPRAM OXALATE 20 MG: 20 TABLET ORAL at 09:19

## 2022-07-25 RX ADMIN — LIDOCAINE HYDROCHLORIDE AND EPINEPHRINE 20 ML: 10; 10 INJECTION, SOLUTION INFILTRATION; PERINEURAL at 09:21

## 2022-07-25 RX ADMIN — SODIUM CHLORIDE, PRESERVATIVE FREE 10 ML: 5 INJECTION INTRAVENOUS at 20:36

## 2022-07-25 RX ADMIN — LOPERAMIDE HYDROCHLORIDE 2 MG: 2 CAPSULE ORAL at 17:50

## 2022-07-25 RX ADMIN — ATORVASTATIN CALCIUM 20 MG: 20 TABLET, FILM COATED ORAL at 02:22

## 2022-07-25 RX ADMIN — ALPRAZOLAM 1 MG: 0.5 TABLET ORAL at 02:22

## 2022-07-25 RX ADMIN — VANCOMYCIN HYDROCHLORIDE 1500 MG: 10 INJECTION, POWDER, LYOPHILIZED, FOR SOLUTION INTRAVENOUS at 20:51

## 2022-07-25 RX ADMIN — ATORVASTATIN CALCIUM 20 MG: 20 TABLET, FILM COATED ORAL at 20:29

## 2022-07-25 RX ADMIN — SODIUM CHLORIDE, POTASSIUM CHLORIDE, SODIUM LACTATE AND CALCIUM CHLORIDE: 600; 310; 30; 20 INJECTION, SOLUTION INTRAVENOUS at 19:18

## 2022-07-25 RX ADMIN — POTASSIUM PHOSPHATE, MONOBASIC AND POTASSIUM PHOSPHATE, DIBASIC 30 MMOL: 224; 236 INJECTION, SOLUTION, CONCENTRATE INTRAVENOUS at 09:30

## 2022-07-25 RX ADMIN — CEFEPIME 2000 MG: 2 INJECTION, POWDER, FOR SOLUTION INTRAVENOUS at 20:34

## 2022-07-25 RX ADMIN — CEFEPIME 2000 MG: 2 INJECTION, POWDER, FOR SOLUTION INTRAVENOUS at 13:44

## 2022-07-25 RX ADMIN — HEPARIN SODIUM 18 UNITS/KG/HR: 10000 INJECTION, SOLUTION INTRAVENOUS at 19:19

## 2022-07-25 RX ADMIN — MAGNESIUM SULFATE HEPTAHYDRATE 2000 MG: 40 INJECTION, SOLUTION INTRAVENOUS at 08:16

## 2022-07-25 RX ADMIN — SODIUM CHLORIDE, POTASSIUM CHLORIDE, SODIUM LACTATE AND CALCIUM CHLORIDE: 600; 310; 30; 20 INJECTION, SOLUTION INTRAVENOUS at 02:29

## 2022-07-25 RX ADMIN — HEPARIN SODIUM 18 UNITS/KG/HR: 10000 INJECTION, SOLUTION INTRAVENOUS at 02:27

## 2022-07-25 RX ADMIN — SODIUM CHLORIDE 25 ML: 9 INJECTION, SOLUTION INTRAVENOUS at 09:27

## 2022-07-25 RX ADMIN — HEPARIN SODIUM 18 UNITS/KG/HR: 10000 INJECTION, SOLUTION INTRAVENOUS at 17:58

## 2022-07-25 RX ADMIN — LOPERAMIDE HYDROCHLORIDE 2 MG: 2 CAPSULE ORAL at 19:28

## 2022-07-25 RX ADMIN — ALPRAZOLAM 1 MG: 0.5 TABLET ORAL at 20:29

## 2022-07-25 NOTE — CONSULTS
General Surgery  Resident Consult Note    Reason for Consult: surgical site infection    History of Present Illness:   Bhavin Paiz is a 68 y.o. female with Hx of PE, breast cancer (ER+), and HTN present to [facility] as a transfer from [place] for a PE and SSI. She is s/p left axillary node excision performed 6/15/22. She states that a week after her surgery she noticed drainage out of the surgical site and returned to clinic where she had an aspiration of the site. She did not notice further symptoms until this past Thursday when she reported to [facility] and was diagnosed with a UTI. At the time she was experiencing fevers and chills. Shortly after her visit to the ED, the patient noticed that she was having increased swelling of her left axilla. She reports the axilla has continued to drain since the 7/21/22. She endorses chills and subjective fevers but denies chest pain, SOB, LE or UE pain. She denies nausea or vomiting and states her last BM was this AM. She is voiding without pain at this time and denies burning. The patient does admit to new onset dyspnea on exertion. Past Medical History:        Diagnosis Date    Arthritis     Breast cancer ([address]     Full dentures     GERD (gastroesophageal reflux disease)     Hypertension     Pneumonia     PONV (postoperative nausea and vomiting)     Pulmonary emboli (HCC)     Reflux     Wears glasses        Past Surgical History:        Procedure Laterality Date    AXILLARY SURGERY Left 6/15/2022    EXCISION OF LEFT AXILLARY NODE performed by Ronna Christianson MD at [facility]  4-    COLOSTOMY      FRACTURE SURGERY      right wrist    HYSTERECTOMY (CERVIX STATUS UNKNOWN)      JOINT REPLACEMENT  2012    knee    KNEE SURGERY      LYMPH NODE DISSECTION      MASTECTOMY      bilateral       Allergies:  Patient has no known allergies.     Medications:   Home Meds  No current facility-administered medications on file prior to encounter. Current Outpatient Medications on File Prior to Encounter   Medication Sig Dispense Refill    atorvastatin (LIPITOR) 20 MG tablet Take 20 mg by mouth in the morning. ondansetron (ZOFRAN) 4 MG tablet Take 1 tablet by mouth every 8 hours as needed for Nausea or Vomiting 2 tablet 1    omeprazole (PRILOSEC) 20 MG delayed release capsule TAKE 1 CAPSULE DAILY      ALPRAZolam (XANAX) 1 MG tablet Take 1 mg by mouth nightly. cyanocobalamin 1000 MCG tablet TAKE 1 TABLET DAILY      denosumab (PROLIA) 60 MG/ML SOSY SC injection Inject 60 mg into the skin      hydroCHLOROthiazide (HYDRODIURIL) 25 MG tablet Take by mouth daily Pt not sure of dose      alosetron (LOTRONEX) 0.5 MG tablet Take 0.5 mg by mouth in the morning and 0.5 mg before bedtime. As needed per the patient (twice daily). escitalopram (LEXAPRO) 10 MG tablet Take 20 mg by mouth in the morning. celecoxib (CELEBREX) 200 MG capsule Take 200 mg by mouth daily.          Current Meds  ALPRAZolam (XANAX) tablet 1 mg, Nightly  escitalopram (LEXAPRO) tablet 20 mg, Daily  heparin 25,000 units in dextrose 5% 250 mL (premix) infusion, Continuous  heparin (porcine) injection 2,850 Units, PRN  heparin (porcine) injection 5,700 Units, PRN  sodium chloride flush 0.9 % injection 5-40 mL, 2 times per day  sodium chloride flush 0.9 % injection 5-40 mL, PRN  0.9 % sodium chloride infusion, PRN  ondansetron (ZOFRAN-ODT) disintegrating tablet 4 mg, Q8H PRN   Or  ondansetron (ZOFRAN) injection 4 mg, Q6H PRN  polyethylene glycol (GLYCOLAX) packet 17 g, Daily PRN  acetaminophen (TYLENOL) tablet 650 mg, Q6H PRN   Or  acetaminophen (TYLENOL) suppository 650 mg, Q6H PRN  lactated ringers infusion, Continuous  cefepime (MAXIPIME) 2000 mg IVPB minibag, Q12H  HYDROmorphone (DILAUDID) injection 0.5 mg, Q3H PRN  oxyCODONE (ROXICODONE) immediate release tablet 5 mg, Q4H PRN  atorvastatin (LIPITOR) tablet 20 mg, Nightly  vancomycin (VANCOCIN) 1,500 mg in dextrose 5 % 250 mL IVPB, Q24H  potassium phosphate 30 mmol in dextrose 5 % 250 mL IVPB, Once  magnesium sulfate 2000 mg in 50 mL IVPB premix, Once      Family History:   Family History   Problem Relation Age of Onset    Breast Cancer Mother         breast    Diabetes Father     High Cholesterol Father     Hypertension Father     Diabetes Paternal Grandmother     Diabetes Paternal Grandfather     Asthma Grandchild     Osteoarthritis Sister     Depression Son     Alcohol Abuse Brother     Emphysema Neg Hx        Social History:   TOBACCO:   reports that she has never smoked. She has never used smokeless tobacco.  ETOH:   reports current alcohol use of about 7.0 standard drinks per week. DRUGS:   reports no history of drug use. ROS:   A 14 point review of systems was conducted, significant findings as noted in HPI. All other systems negative. Physical exam:    Vitals:    07/25/22 0033 07/25/22 0113 07/25/22 0401   BP: 137/66  (!) 108/59   Pulse: 72  77   Resp: 18  17   Temp: 98.9 °F (37.2 °C)  99.5 °F (37.5 °C)   TempSrc: Oral  Oral   SpO2: 98%  96%   Weight:  157 lb (71.2 kg)    Height:  5' 6.5\" (1.689 m)        General appearance: alert, no acute distress, grooming appropriate  Eyes: PERRL, no scleral icterus  Neck: trachea midline, no JVD  Chest/Lungs: normal effort, no adventitious breathing, no accessory muscle use, on RA  Cardiovascular: RRR  Abdomen: soft, non-tender, non-distended, no guarding/rigidity   Skin: warm and dry, no rashes, 4cm x 4cm of inflamed fluctuance on left axilla, focused around incision site, with surrounding approximately 1cm of induration.     Extremities: no edema, no cyanosis  Neuro: A&Ox3, no focal deficits, sensation intact    Labs:    CBC:   Recent Labs     07/24/22 1830 07/25/22  0303   WBC 4.3 4.2   HGB 10.4* 10.3*   HCT 29.2* 30.3*   MCV 95.6 96.1    206       BMP:   Recent Labs     07/24/22 1830 07/25/22  0303   * 129*   K 3.7 3.1*  3.1*   CL 89* 91* CO2 29 30   PHOS  --  1.9*   BUN 19 12   CREATININE 0.7 0.7       PT/INR: No results for input(s): PROTIME, INR in the last 72 hours. APTT:   Recent Labs     07/24/22 2050   APTT 24.0       Liver Profile:   Lab Results   Component Value Date/Time    AST 44 07/24/2022 06:30 PM    ALT 46 07/24/2022 06:30 PM    BILIDIR 0.17 06/06/2012 03:42 PM    BILITOT 0.5 07/24/2022 06:30 PM    ALKPHOS 99 07/24/2022 06:30 PM     Lab Results   Component Value Date/Time    CHOL 151 06/07/2012 04:45 AM    HDL 53 06/07/2012 04:45 AM    TRIG 70 06/07/2012 04:45 AM     UA:   Lab Results   Component Value Date/Time    COLORU Yellow 07/24/2022 06:23 PM    PHUR 6.5 07/24/2022 06:23 PM    WBCUA 3-5 11/10/2014 01:00 PM    RBCUA None seen 11/10/2014 01:00 PM    BACTERIA 1+ 11/10/2014 01:00 PM    CLARITYU Clear 07/24/2022 06:23 PM    SPECGRAV <=1.005 07/24/2022 06:23 PM    LEUKOCYTESUR Negative 07/24/2022 06:23 PM    UROBILINOGEN 0.2 07/24/2022 06:23 PM    BILIRUBINUR Negative 07/24/2022 06:23 PM    BILIRUBINUR NEGATIVE 06/07/2012 08:40 PM    BLOODU Negative 07/24/2022 06:23 PM    GLUCOSEU Negative 07/24/2022 06:23 PM    GLUCOSEU NEGATIVE 06/07/2012 08:40 PM       Imaging:   No orders to display         Assessment/Plan: This is a 68 y.o. female with Hx of PE, ER + breast cancer, and HTN. She is s/p left axillary node excision (6/15). There is a 4cm x 4cm inflamed area of fluctuance in the left axilla. The patient received a CT PE where she was found to have a PE and was placed of a Hep gtt. Given the recent surgical history and physical presentation, it is likely an incisional abscess. Currently she is afebrile and HDS on RA, with no leukocytosis. - Will plan on needle aspiration of fluctuant area and send for culture and cytology if it appears infected. - Will modify antibiotics based on wound cultures.   - continue Abx cefepime, vanc until cultures result  - Hep gtt per per primary team at this time    Patient was seen by senior resident and discussed with Dr. Yudi Jeffrey DO  PGY1, General Surgery  07/25/22  7:51 AM  [employer]  Pager: [phone]    Patient well known to me with history of left breast cancer and axillary recurrence. Admitted yesterday with episode of weakness, malaise, PE, cellulitis and diarrhea. Currently feeling mildly better. Left axilla shows 4cm area of induration and erythema. Had spontaneous drainage from axilla yesterday - drained ~ 3cc of yellow fluid per surgical resident this morning. Minimal collection present now. Axilla feeling better per patient. Culture of seroma fluid pending - but suspect just has cellulitis. Ice to site, continue antibiotics. Hold verzenio - I will discuss with med onc. This may be contributing to her loose stools. No role for surgical drainage at this time.     Izzy Cooper MD  [phone]

## 2022-07-25 NOTE — PROGRESS NOTES
4 Eyes Admission Assessment     I agree as the admission nurse that 2 RN's have performed a thorough Head to Toe Skin Assessment on the patient. ALL assessment sites listed below have been assessed on admission. Areas assessed by both nurses:   [x]   Head, Face, and Ears   [x]   Shoulders, Back, and Chest  [x]   Arms, Elbows, and Hands   [x]   Coccyx, Sacrum, and Ischium  [x]   Legs, Feet, and Heels  No pressure injury found. However, pt has an abscess with redness on the left armpit. Does the Patient have Skin Breakdown?   No         Nino Prevention initiated:  No   Wound Care Orders initiated:  No      [facility] nurse consulted for Pressure Injury (Stage 3,4, Unstageable, DTI, NWPT, and Complex wounds) or Nino score 18 or lower:  No      Nurse 1 eSignature: Electronically signed by Charisse Joseph RN on 7/25/22 at 4:50 AM EDT    **SHARE this note so that the co-signing nurse is able to place an eSignature**    Nurse 2 eSignature: Electronically signed by Kay Lombard, RN on 7/25/22 at 5:23 AM EDT

## 2022-07-25 NOTE — ED NOTES
2133- Call to [facility] with RN to repage general surgery per Dr. Viola Woodson. RN states Dr. Moriah Hardy with general surgery said there was not a need to connect with Dr. Viola Woodson and he will see the pt. Upon arrival.  Rn gave bed assignment and report number at this time. Bed 4304  Report # [phone]  [facility] to set up transport. Transport needs include cardiac monitor and heparin drip. 2210- [facility] with call for transport. [facility] with ETA at 2300.        Cinthia Claros RN  07/24/22 2145       Cinthia Claros RN  07/24/22 2220

## 2022-07-25 NOTE — CONSULTS
High dose Heparin GTT:  Once baseline labs drawn give a 5,200unit IV Bolus dose of heparin and begin the infusion at 1,170 units/hr. Check an Anti-Xa 6hrs later. Desired range is 0.3-0.7 IU/mL. ABW of 65kg used for dose calculations.   EDUIN Trejo CAITY [facility] PharmD 7/24/2022 8:10 PM

## 2022-07-25 NOTE — H&P
Hospital Medicine History & Physical      PCP: Yani Collazo DO    Date of Admission: 7/25/2022    Date of Service: Pt seen/examined on 7/25/2022 and Admitted to Inpatient with expected LOS greater than two midnights due to medical therapy. Chief Complaint: Altered mental status and drainage from left axilla and surrounding erythema      History Of Present Illness:    68 y.o. female who presents from [facility] with complaints of confusion per family and erythema and drainage from the left axillary region. Patient has history of breast cancer with ER +. She underwent left axillary lymph node excision on 6/15. About a week later patient started noticing drainage FROM the surgical site and return to the clinic where an aspiration was done. She felt fine until past Thursday when she went to breakfast and out and was diagnosed with a UTI. Patient had fever and chills at that time as well as increased swelling to her left axilla. Patient states it continued to drain from the left axilla since 7/21 which was associated with subjective fevers and chills. Patient denies chest pain, shortness of breath, pleurisy, calf tenderness or asymmetry. CT with contrast was done at [facility] ED for further evaluation of her left axillary area which showed an abscess as well as accidental finding of left lower extremity subsegmental pulmonary embolism.   And patient was started on heparin drip and was given broad-spectrum antibiotics    Past Medical History:          Diagnosis Date    Arthritis     Breast cancer ([address]     Full dentures     GERD (gastroesophageal reflux disease)     Hypertension     Pneumonia     PONV (postoperative nausea and vomiting)     Pulmonary emboli (HCC)     Reflux     Wears glasses        Past Surgical History:          Procedure Laterality Date    AXILLARY SURGERY Left 6/15/2022    EXCISION OF LEFT AXILLARY NODE performed by Brendan Ramirez MD at [address] [place] [address] BIOPSY      CHOLECYSTECTOMY  4-    COLOSTOMY      FRACTURE SURGERY      right wrist    HYSTERECTOMY (CERVIX STATUS UNKNOWN)      JOINT REPLACEMENT  2012    knee    KNEE SURGERY      LYMPH NODE DISSECTION      MASTECTOMY      bilateral       Medications Prior to Admission:      Prior to Admission medications    Medication Sig Start Date End Date Taking? Authorizing Provider   ondansetron (ZOFRAN) 4 MG tablet Take 1 tablet by mouth every 8 hours as needed for Nausea or Vomiting 6/15/22   Skyla Wesley MD   omeprazole (PRILOSEC) 20 MG delayed release capsule TAKE 1 CAPSULE DAILY 3/2/22   Historical Provider, MD   ALPRAZolam Welford Bolognese) 1 MG tablet Take 1 mg by mouth nightly. Historical Provider, MD   cyanocobalamin 1000 MCG tablet TAKE 1 TABLET DAILY 11/11/21   Historical Provider, MD   denosumab (PROLIA) 60 MG/ML SOSY SC injection Inject 60 mg into the skin 10/29/21   Historical Provider, MD   hydroCHLOROthiazide (HYDRODIURIL) 25 MG tablet Take by mouth daily Pt not sure of dose    Historical Provider, MD   alosetron (LOTRONEX) 0.5 MG tablet Take 0.5 mg by mouth in the morning and 0.5 mg before bedtime. As needed per the patient (twice daily). 4/10/18   Historical Provider, MD   ondansetron (ZOFRAN ODT) 4 MG disintegrating tablet Take 1 tablet by mouth every 8 hours as needed for Nausea 3/26/18   JOSE Little - CNP   escitalopram (LEXAPRO) 10 MG tablet Take 20 mg by mouth in the morning. Historical Provider, MD   celecoxib (CELEBREX) 200 MG capsule Take 200 mg by mouth daily. Historical Provider, MD       Allergies:  Patient has no known allergies. Social History:      The patient currently lives at home    TOBACCO:   reports that she has never smoked. She has never used smokeless tobacco.  ETOH:   reports current alcohol use of about 7.0 standard drinks per week.   E-cigarette/Vaping       Questions Responses    E-cigarette/Vaping Use Never User    Start Date     Passive Exposure     Quit Date Counseling Given     Comments               Family History:    Reviewed and negative in regards to presenting illness/complaint. Problem Relation Age of Onset    Breast Cancer Mother         breast    Diabetes Father     High Cholesterol Father     Hypertension Father     Diabetes Paternal Grandmother     Diabetes Paternal Grandfather     Asthma Grandchild     Osteoarthritis Sister     Depression Son     Alcohol Abuse Brother     Emphysema Neg Hx        REVIEW OF SYSTEMS COMPLETED:   Pertinent positives as noted in the HPI. All other systems reviewed and negative. PHYSICAL EXAM PERFORMED:    There were no vitals taken for this visit. General appearance:  No apparent distress, appears stated age and cooperative. HEENT:  Normal cephalic, atraumatic without obvious deformity. Pupils equal, round, and reactive to light. Extra ocular muscles intact. Conjunctivae/corneas clear. Neck: Supple, with full range of motion. No jugular venous distention. Trachea midline. Respiratory:  Normal respiratory effort. Clear to auscultation, bilaterally without Rales/Wheezes/Rhonchi. Cardiovascular:  Regular rate and rhythm with normal S1/S2 without murmurs, rubs or gallops. Abdomen: Soft, non-tender, non-distended with normal bowel sounds. Musculoskeletal:  No clubbing, cyanosis or edema bilaterally. Full range of motion without deformity. Skin: Skin color, texture, turgor normal.  Left axilla-erythematous with fluctuance, focused around incision site with induration  Neurologic:  Neurovascularly intact without any focal sensory/motor deficits.  Cranial nerves: II-XII intact, grossly non-focal.  Psychiatric:  Alert and oriented, thought content appropriate, normal insight  Capillary Refill: Brisk,3 seconds, normal  Peripheral Pulses: +2 palpable, equal bilaterally       Labs:     Recent Labs     07/24/22  1830   WBC 4.3   HGB 10.4*   HCT 29.2*        Recent Labs     07/24/22  1830   *   K 3.7   CL 89*   CO2 29   BUN 19   CREATININE 0.7   CALCIUM 8.7     Recent Labs     07/24/22  1830   AST 44*   ALT 46*   BILITOT 0.5   ALKPHOS 99     No results for input(s): INR in the last 72 hours. No results for input(s): Claus Gallagher in the last 72 hours. Urinalysis:      Lab Results   Component Value Date/Time    NITRU Negative 07/24/2022 06:23 PM    WBCUA 3-5 11/10/2014 01:00 PM    BACTERIA 1+ 11/10/2014 01:00 PM    RBCUA None seen 11/10/2014 01:00 PM    BLOODU Negative 07/24/2022 06:23 PM    SPECGRAV <=1.005 07/24/2022 06:23 PM    GLUCOSEU Negative 07/24/2022 06:23 PM    GLUCOSEU NEGATIVE 06/07/2012 08:40 PM       Radiology:     CXR: I have reviewed the CXR with the following interpretation: N/A  EKG:  I have reviewed the EKG with the following interpretation: N/A    CT chest with contrast  1. There is a 6.8 cm peripherally enhancing collection in the left axilla   with surrounding inflammatory stranding suspicious for abscess, less likely   postoperative seroma. 2.  Incidentally noted pulmonary embolism within a left lower lobe segmental   pulmonary artery extending to subsegmental pulmonary arteries. The right   ventricle is not abnormally enlarged, however there is mild right atrial   dilation and reflux of contrast into the hepatic veins, suggestive of right   atrial dysfunction. 3.  Scattered bilateral noncalcified pulmonary nodules are stable since May   2018 CT. 4.  A 3.5 cm hypoattenuating hepatic lesion with central calcification has   decreased in size since March 2018.      Consults:  IP CONSULT TO PHARMACY    ASSESSMENT:    Active Hospital Problems    Diagnosis Date Noted    Abscess of axilla, left [L02.412] 07/25/2022     Priority: Medium    Abscess [L02.91] 07/25/2022     Priority: Medium   #Left axillary abscess  #Left breast cancer with lymph node involvement with recent surgery on [address]  #Accidental finding on CT chest-left lower lobe subsegmental pulmonary embolism with suspected right atrial dysfunction  #Hyponatremia  #Elevated LFTs, hepatic lesion decreased in size on CT  #Chronic anemia    PLAN:  -Continue on vancomycin and cefepime  -Pain relief as ordered  -IV hydration  -N.p.o.  -General surgery consult  -Continue on heparin drip per PE protocol  -Continue to monitor electrolytes, renal function and LFTs  -Monitor CBC  -Echocardiogram  -Consider oncology consult  -Supportive therapy      DVT Prophylaxis: Heparin drip  Diet: Diet NPO  Code Status: Full Code    PT/OT Eval Status: Bedrest    Dispo -PCU with telemetry       Juan Cespedes MD    Thank you Lisy Cunningham DO for the opportunity to be involved in this patient's care. If you have any questions or concerns please feel free to contact me at [phone].

## 2022-07-25 NOTE — CARE COORDINATION
Case Management Assessment           Initial Evaluation                Date / Time of Evaluation: 7/25/2022 2:18 PM                 Assessment Completed by: Abrahan Crowell RN    Patient Name: Anju Weiner     YOB: 1945  Diagnosis: Abscess of axilla, left [L02.412]  Abscess [L02.91]     Date / Time: 7/25/2022 12:25 AM    Patient Admission Status: Inpatient    If patient is discharged prior to next notation, then this note serves as note for discharge by case management. Current PCP: Luis Carlos Torres DO  Clinic Patient: No    Chart Reviewed: Yes  Patient/ Family Interviewed: Yes    Initial assessment completed at bedside with: patient and daughter at bedside    Hospitalization in the last 30 days: No    Emergency Contacts:  Extended Emergency Contact Information  Primary Emergency Contact: [address] [place] [address] Phone: [phone]  Mobile Phone: [phone]  Relation: Child   needed? No  Secondary Emergency Contact: [address] [place] [address] Phone: [phone]  Mobile Phone: [phone]  Relation: Other   needed?  No    Advance Directives:   Code Status: Full Code    Healthcare Power of : No  Agent:   Contact Number:     Copy present: No     In paper Chart: No    Scanned into EMR No    Financial  Payor: Daisy Late / Plan: Steffanie Smiley PPO / Product Type: Medicare /     Pre-cert required for SNF: Yes    Pharmacy    [facility], [facility] [phone]  [address] [place] [address]  Phone: [phone] Fax: [phone]    [facility], [address] [facility] [phone]  [address] [facility] [address]  Phone: [phone] Fax: [phone] [place], [address] [phone] Adair Hay [phone]  [facility] [address]  Phone: [phone] Fax: [phone]      Potential assistance Purchasing Medications: Potential Assistance Purchasing Medications: No  Does Patient want to participate in local refill/ meds to beds program?:      Meds To Beds General Rules:  1. Can ONLY be done Monday- Friday between 8:30am-5pm  2. Prescription(s) must be in pharmacy by 3pm to be filled same day  3. Copy of patient's insurance/ prescription drug card and patient face sheet must be sent along with the prescription(s)  4. Cost of Rx cannot be added to hospital bill. If financial assistance is needed, please contact unit  or ;  or  CANNOT provide pharmacy voucher for patients co-pays  5.  Patients can then  the prescription on their way out of the hospital at discharge, or pharmacy can deliver to the bedside if staff is available. (payment due at time of pick-up or delivery - cash, check, or card accepted)     Able to afford home medications/ co-pay costs: Yes    ADLS  Support Systems: Children    PT AM-PAC:   /24  OT AM-PAC:   /24    [place]: from home alone  Steps: 2 steps in from outside, 2 story home with bedrooms and bathrooms on 2nd floor    Plans to RETURN to current housing: Yes  Barriers to RETURNING to current housing: none    [address]  Currently ACTIVE with [address]: No  [address]: Christina Hernandez  Phone: [phone]  Fax: [phone], following for [facility]    Currently ACTIVE with [place] on [facility]: No  Passport/ Waiver: No  Passport/ Waiver Services: Not Applicable    :  Phone:       [id number]  [facility] Provider: none  Equipment:     Home Oxygen and [address] prior to admission: No  [address]: Not Applicable  Other Respiratory Equipment:     Informed of need to bring portable home O2 tank on day of DISCHARGE for nursing to connect prior to leaving: No  Verbalized agreement/Understanding: No  Person to bring portable tank at discharge:     Dialysis  Active with HD/PD prior to admission: No  Nephrologist:     HD Center:  Not Applicable    DISCHARGE PLAN:  Disposition: Home with [address] [facility] [address]: [facility] following for new Harish Aparicio needs     Transportation PLAN for discharge: family     Factors facilitating achievement of predicted outcomes: Family support, Cooperative, Pleasant, and Good insight into deficits    Barriers to discharge: Medication managment    Additional Case Management Notes: CM met with patient and daughter at bedside, patient from home alone. Patient does not use DME or HHC prior to admission. Patient and daughter interested in [address] at [facility], no preference in agency, referral given to [facility] with [facility]. Patient plans for return home at [place], family to transport at DC and provide home support as needed upon DC. The Plan for Transition of Care is related to the following treatment goals of Abscess of axilla, left [L02.412]  Abscess [L02.91]    The Patient and/or patient representative Crow Sanderson and her family were provided with a choice of provider and agrees with the discharge plan Yes    Freedom of choice list was provided with basic dialogue that supports the patient's individualized plan of care/goals and shares the quality data associated with the providers.  Yes    Care Transition patient: No    Alejandra Leblanc RN  [facility]  Case Management Department  Ph: [phone]   Fax: [phone]

## 2022-07-25 NOTE — CONSULTS
Clinical Pharmacy Progress Note    Vancomycin - Management by Pharmacy    Consult Date(s): 7/25/22  Consulting Provider(s): Dr. [facility] / Plan    1) L axillary abscess - Vancomycin  Concurrent Antimicrobials:   Cefepime - Day #2  Day of Vanc Therapy / Ordered Duration: #2 of 5  Current Dosing Method: Bayesian-Guided AUC Dosing  Therapeutic Goal: 400-600 mg/L*hr  Current Dose / Frequency: 1500mg IV q24h  Plan / Rationale: On 1500mg IV q24h  Based on Bayesian kinetics, estimated AUC is ~455 mg/L*h with steady-state trough of ~12.2 mg/L  Vancomycin level ordered for tomorrow AM, Tues 7/26. Will continue to monitor clinical condition and make adjustments to regimen as appropriate. Thank you for consulting Pharmacy! Kathy Marcum, PharmD, [facility]  Wireless: [id number]   7/25/2022 8:55 AM          Subjective/Objective: Ms. Juan Acuña is a 68 y.o. female with a PMHx significant for PE, breast cancer, HTN who presented from [facility] ED with PE and SSTI. Pt is s/p L axillary node excision on 6/15/22. Pt reports axilla has been draining, also reported chills and fevers. In ED, as CTPA showed acute PE. Admitted with L axillary abscess, acute PE. Pharmacy has been consulted to dose Vancomycin. Ht Readings from Last 1 Encounters:   07/25/22 5' 6.5\" (1.689 m)     Wt Readings from Last 1 Encounters:   07/25/22 157 lb (71.2 kg)     Current & Prior Antimicrobial Regimen(s):  Cefepime (7/25-current)  Vancomycin - Pharmacy to dose   7/24  1500mg IV q24h (7/25-current)    Level(s) / Doses:  Date Time Dose Level / Type of Level Interpretation                 Note: Serum levels collected for AUC-based dosing may be high if collected in close proximity to the dose administered. This is not necessarily indicative of toxicity.     Cultures & Sensitivities:  Date Site Micro Susceptibility / Result                 Labs / Ancillary Data:    Estimated Creatinine Clearance: 65 mL/min (based on SCr of 0.7 mg/dL).     Recent Labs     07/24/22  1830 07/25/22  0303   CREATININE 0.7 0.7   BUN 19 12   WBC 4.3 4.2       Additional Lab Values / Findings of Note:    Procalcitonin:   Recent Labs     07/24/22  1830   PROCAL 0.04

## 2022-07-25 NOTE — ED NOTES
2018- Call to [facility] to initiate transfer. 2039- [facility] with Dr. Kosta Noble to connect with Dalila Jewell.       Damaris Velasco RN  07/24/22 6839

## 2022-07-25 NOTE — ED PROVIDER NOTES
CHIEF COMPLAINT  Altered Mental Status (Pt is being treated for uti. Pt states this morning she was a bit confused. Concerned for dehydration. Pt also states she has some drainage from incision site under left arm)      HISTORY OF PRESENT ILLNESS  Mirlande Duque is a 68 y.o. female with a history of hypertension, GERD, PE, BRCA who presents to the ED complaining of multiple complaints. Patient presents for evaluation of waxing and waning confusion as well as generalized weakness and fatigue. She is concerned she might be dehydrated. Was seen for similar symptoms at [facility] on Thursday and was diagnosed with a UTI and started on cefuroxime. Has not improved. She also reports left upper extremity axilla redness and drainage. States 5 weeks ago she underwent lymph node excision related to breast cancer. States surgery occurred at [facility], [facility]. and that the rest of her physicians are at [facility].  Surgeon was Dr. Indigo Cole. Denies measured fever, chest pain, dyspnea, cough, nausea, vomiting, abdominal pain, diarrhea, dysuria. No other complaints, modifying factors or associated symptoms. I have reviewed the following from the nursing documentation.     Past Medical History:   Diagnosis Date    Arthritis     Breast cancer ([address]     Full dentures     GERD (gastroesophageal reflux disease)     Hypertension     Pneumonia     PONV (postoperative nausea and vomiting)     Pulmonary emboli (HCC)     Reflux     Wears glasses      Past Surgical History:   Procedure Laterality Date    AXILLARY SURGERY Left 6/15/2022    EXCISION OF LEFT AXILLARY NODE performed by Serg Davenport MD at [place] [address]  4-    COLOSTOMY      FRACTURE SURGERY      right wrist    HYSTERECTOMY (CERVIX STATUS UNKNOWN)      JOINT REPLACEMENT  2012    knee    KNEE SURGERY      LYMPH NODE DISSECTION      MASTECTOMY      bilateral     Family History   Problem Relation Age of Onset    Breast Cancer Mother         breast    Diabetes Father     High Cholesterol Father     Hypertension Father     Diabetes Paternal Grandmother     Diabetes Paternal Grandfather     Asthma Grandchild     Osteoarthritis Sister     Depression Son     Alcohol Abuse Brother     Emphysema Neg Hx      Social History     Socioeconomic History    Marital status: Single     Spouse name: Not on file    Number of children: Not on file    Years of education: Not on file    Highest education level: Not on file   Occupational History    Not on file   Tobacco Use    Smoking status: Never    Smokeless tobacco: Never   Vaping Use    Vaping Use: Never used   Substance and Sexual Activity    Alcohol use:  Yes     Alcohol/week: 7.0 standard drinks     Types: 7 Glasses of wine per week     Comment: glass of wine nightly    Drug use: No    Sexual activity: Never   Other Topics Concern    Not on file   Social History Narrative    Not on file     Social Determinants of Health     Financial Resource Strain: Not on file   Food Insecurity: Not on file   Transportation Needs: Not on file   Physical Activity: Not on file   Stress: Not on file   Social Connections: Not on file   Intimate Partner Violence: Not on file   Housing Stability: Not on file     Current Facility-Administered Medications   Medication Dose Route Frequency Provider Last Rate Last Admin    [START ON 7/25/2022] heparin (porcine) injection 5,200 Units  5,200 Units IntraVENous PRN James Culp MD        [START ON 7/25/2022] heparin (porcine) injection 2,600 Units  2,600 Units IntraVENous PRN James Culp MD        heparin 25,000 units in dextrose 5% 250 mL (premix) infusion  1,170 Units/hr IntraVENous Continuous James Culp MD   Patient Transferred to Other Facility at 07/24/22 0843     Current Outpatient Medications   Medication Sig Dispense Refill    ondansetron (ZOFRAN) 4 MG tablet Take 1 tablet by mouth every 8 hours as needed for Nausea or Vomiting 2 tablet 1    omeprazole (PRILOSEC) 20 MG delayed release capsule TAKE 1 CAPSULE DAILY      ALPRAZolam (XANAX) 1 MG tablet Take 1 mg by mouth nightly. cyanocobalamin 1000 MCG tablet TAKE 1 TABLET DAILY      denosumab (PROLIA) 60 MG/ML SOSY SC injection Inject 60 mg into the skin      hydroCHLOROthiazide (HYDRODIURIL) 25 MG tablet Take by mouth daily Pt not sure of dose      alosetron (LOTRONEX) 0.5 MG tablet Take 0.5 mg by mouth in the morning and 0.5 mg before bedtime. As needed per the patient (twice daily). ondansetron (ZOFRAN ODT) 4 MG disintegrating tablet Take 1 tablet by mouth every 8 hours as needed for Nausea 20 tablet 0    escitalopram (LEXAPRO) 10 MG tablet Take 20 mg by mouth in the morning. celecoxib (CELEBREX) 200 MG capsule Take 200 mg by mouth daily. No Known Allergies    REVIEW OF SYSTEMS  10 systems reviewed, pertinent positives per HPI otherwise noted to be negative. PHYSICAL EXAM  /82   Pulse 79   Temp 97 °F (36.1 °C) (Oral)   Resp 19   Ht 5' 6.5\" (1.689 m)   Wt 157 lb (71.2 kg)   SpO2 97%   BMI 24.96 kg/m²   GENERAL APPEARANCE: Awake and alert. Cooperative. No acute distress. Appears elderly, frail but comfortable and nontoxic. HEAD: Normocephalic. Atraumatic. EYES: PERRL. EOM's grossly intact. ENT: Mucous membranes are moist.   NECK: Supple, trachea midline. HEART: RRR. Normal S1, S2. No murmurs, rubs or gallops. LUNGS: Respirations unlabored. CTAB. Good air exchange. No wheezes, rales, or rhonchi. Speaking comfortably in full sentences. ABDOMEN: Soft. Non-distended. Non-tender. No guarding or rebound. Normal Bowel sounds. EXTREMITIES: No peripheral edema. MAEE. No acute deformities. SKIN: Warm and dry. Left axilla, anterior aspect with overlying erythema with tenderness and significant warmth. Scant purulent appearing drainage from incision site. NEUROLOGICAL: Alert and interactive but with some confusion versus mild dementia. Eleazar MELENDEZ Urinalysis with Reflex to Culture    Specimen: Urine   Result Value Ref Range    Color, UA Yellow Straw/Yellow    Clarity, UA Clear Clear    Glucose, Ur Negative Negative mg/dL    Bilirubin Urine Negative Negative    Ketones, Urine Negative Negative mg/dL    Specific Gravity, UA <=1.005 1.005 - 1.030    Blood, Urine Negative Negative    pH, UA 6.5 5.0 - 8.0    Protein, UA Negative Negative mg/dL    Urobilinogen, Urine 0.2 <2.0 E.U./dL    Nitrite, Urine Negative Negative    Leukocyte Esterase, Urine Negative Negative    Microscopic Examination Not Indicated     Urine Type NotGiven     Urine Reflex to Culture Not Indicated    APTT   Result Value Ref Range    aPTT 24.0 23.0 - 34.3 sec             RADIOLOGY  X-RAYS:  I have reviewed radiologic plain film image(s). ALL OTHER NON-PLAIN FILM IMAGES SUCH AS CT, ULTRASOUND AND MRI HAVE BEEN READ BY THE RADIOLOGIST. CT CHEST W CONTRAST   Final Result   1. There is a 6.8 cm peripherally enhancing collection in the left axilla   with surrounding inflammatory stranding suspicious for abscess, less likely   postoperative seroma. 2.  Incidentally noted pulmonary embolism within a left lower lobe segmental   pulmonary artery extending to subsegmental pulmonary arteries. The right   ventricle is not abnormally enlarged, however there is mild right atrial   dilation and reflux of contrast into the hepatic veins, suggestive of right   atrial dysfunction. 3.  Scattered bilateral noncalcified pulmonary nodules are stable since May   2018 CT. 4.  A 3.5 cm hypoattenuating hepatic lesion with central calcification has   decreased in size since March 2018. Discussed with Dr. Madison Leung by Dr. Joie Dan at 8:08 pm on 7/24/2022                    Rechecks: Physical assessment performed. Unchanged. Critical Care: 30min. Treatment of postoperative infection with IV antibiotics. Review of extensive labs and imaging.   Treatment of pulmonary embolism with intravenous heparin. Coordination of transfer. ED COURSE/MDM  Patient seen and evaluated. Old records reviewed. Labs and imaging reviewed and results discussed with patient. Patient status post resection of a cancerous lymph node in the left axilla related to breast cancer. She has developed postoperative infection with abscess and overlying cellulitis. Presented today due to confusion and generalized weakness. On imaging incidental finding of a segmental pulmonary embolism in the left lower lobe. Patient started on IV antibiotics and heparin and will be transferred back to [facility].    Discharge Medication List as of 7/24/2022 11:13 PM          CLINICAL IMPRESSION  1. Abscess of axilla, left    2. Single subsegmental pulmonary embolism without acute cor pulmonale (HCC)        Blood pressure 122/82, pulse 79, temperature 97 °F (36.1 °C), temperature source Oral, resp. rate 19, height 5' 6.5\" (1.689 m), weight 157 lb (71.2 kg), SpO2 97 %. DISPOSITION  Juan Francisco Sun was transferred in stable condition.         Farzana Saravia MD  07/25/22 [id number]

## 2022-07-25 NOTE — PROGRESS NOTES
Patient notified her daughter to bring her chemo pills for a relapsed breast cancer. Pt stated the medicine does make her \"sick\"-N/V, Diarrhea.

## 2022-07-25 NOTE — PROGRESS NOTES
Surgery Daily Progress Note  [facility]  CC:  Abscess left axilla     Subjective : Overnight events include starting heparin drip for PE. Started on antibiotics for left axilla abscess and UTI.  VSS, Tmax 99.5   Patient denies pain at this time        Objective    Infusions:   heparin (PORCINE) Infusion 18 Units/kg/hr (07/25/22 0227)    sodium chloride      lactated ringers 100 mL/hr at 07/25/22 0229        I/O:No intake/output data recorded. Wt Readings from Last 1 Encounters:   07/25/22 157 lb (71.2 kg)                 LABS:    Recent Labs     07/24/22 1830 07/25/22  0303   WBC 4.3 4.2   HGB 10.4* 10.3*   HCT 29.2* 30.3*   MCV 95.6 96.1    206        Recent Labs     07/24/22  1830 07/25/22  0303   * 129*   K 3.7 3.1*  3.1*   CL 89* 91*   CO2 29 30   PHOS  --  1.9*   BUN 19 12   CREATININE 0.7 0.7        Recent Labs     07/24/22  1830   AST 44*   ALT 46*   BILITOT 0.5   ALKPHOS 99      No results for input(s): LIPASE, AMYLASE in the last 72 hours. Recent Labs     07/24/22 1830 07/24/22  2050   PROT 6.1*  --    APTT  --  24.0      No results for input(s): CKTOTAL, CKMB, CKMBINDEX, TROPONINI in the last 72 hours. Exam:BP (!) 108/59   Pulse 77   Temp 99.5 °F (37.5 °C) (Oral)   Resp 17   Ht 5' 6.5\" (1.689 m)   Wt 157 lb (71.2 kg)   SpO2 96%   BMI 24.96 kg/m²     General appearance: alert, appears stated age and cooperative  Neck: trachea midline  Heart: regular rate and rhythm, S1, S2   Lungs: unlabored on RA  Skin: warm and dry, no rashes  Extremities: no edema, no cyanosis. Left axilla with slight improvement in induration and erythema. Pinpoint wound noted in middle of incision without drainage   Abdomen: soft, appropriately-tender; bowel sounds present      ASSESSMENT/PLAN: This is a 68 y.o. female with Hx of PE, ER + breast cancer, and HTN. She is s/p left axillary node excision (6/15). There is a 8cm x 4cm inflamed area of fluctuance in the left axilla.  The patient received a CT PE where she was found to have a PE and was placed of a Hep gtt. Given the recent surgical history and physical presentation, it is likely an incisional abscess. Currently she is afebrile and HDS on RA, with no leukocytosis.      - Monitor WBC   - Continue cefepime and vanc  - Will discuss with staff regarding further imaging/US to evaluate for fluid collection/abscess   - Diet: continue NPO   - Medical management per primary       JOSE Shine CNP 7/25/2022 6:49 AM   Available via [employer] [id number]

## 2022-07-25 NOTE — PLAN OF CARE
Problem: Discharge Planning  Goal: Discharge to home or other facility with appropriate resources  Outcome: Progressing  Flowsheets (Taken 7/25/2022 0126)  Discharge to home or other facility with appropriate resources:   Identify barriers to discharge with patient and caregiver   Arrange for needed discharge resources and transportation as appropriate   Identify discharge learning needs (meds, wound care, etc)   Refer to discharge planning if patient needs post-hospital services based on physician order or complex needs related to functional status, cognitive ability or social support system     Problem: Safety - Adult  Goal: Free from fall injury  Outcome: Progressing     Problem: ABCDS Injury Assessment  Goal: Absence of physical injury  Outcome: Progressing     Problem: Skin/Tissue Integrity - Adult  Goal: Skin integrity remains intact  Outcome: Progressing  Goal: Incisions, wounds, or drain sites healing without S/S of infection  Outcome: Progressing     Problem: Musculoskeletal - Adult  Goal: Return mobility to safest level of function  Outcome: Progressing  Goal: Maintain proper alignment of affected body part  Outcome: Progressing  Goal: Return ADL status to a safe level of function  Outcome: Progressing     Problem: Infection - Adult  Goal: Absence of infection at discharge  Outcome: Progressing  Goal: Absence of fever/infection during anticipated neutropenic period  Outcome: Progressing     Problem: Hematologic - Adult  Goal: Maintains hematologic stability  Outcome: Progressing

## 2022-07-25 NOTE — CONSULTS
General Surgery  Resident Consult Note    Reason for Consult: surgical site abscess    History of Present Illness:   Martell Acevedo is a 68 y.o. female with Hx of PE, breast cancer (ER+), and HTN. She is s/p left axillary node excision performed 6/15/22. She states a week after her surgery she noticed drainage out of the surgical site and returned to clinic where she had an aspiration. She did not notice further symptoms until this past Thursday when she reported to [facility] and was diagnosed with a UTI at the time she was experiencing fevers and chills. Shortly after her visit to the ED, the patient noticed that she was having increased swelling of her left axilla. She reports the axilla has continued to drain since the 7/21/22. She endorses chills and subjective fever. She denies chest pain, SOB, LE or UE pain. She denies nausea or vomiting and states her last BM was this AM. She is voiding without pain at this time and denies burning. The patient does admit to new onset dyspnea on exertion. Past Medical History:        Diagnosis Date    Arthritis     Breast cancer ([address]     Full dentures     GERD (gastroesophageal reflux disease)     Hypertension     Pneumonia     PONV (postoperative nausea and vomiting)     Pulmonary emboli (HCC)     Reflux     Wears glasses        Past Surgical History:        Procedure Laterality Date    AXILLARY SURGERY Left 6/15/2022    EXCISION OF LEFT AXILLARY NODE performed by Fabio Ko MD at [place] [address]  4-    COLOSTOMY      FRACTURE SURGERY      right wrist    HYSTERECTOMY (CERVIX STATUS UNKNOWN)      JOINT REPLACEMENT  2012    knee    KNEE SURGERY      LYMPH NODE DISSECTION      MASTECTOMY      bilateral       Allergies:  Patient has no known allergies. Medications:   Home Meds  No current facility-administered medications on file prior to encounter.      Current Outpatient Medications on File Prior to Encounter   Medication Sig Dispense Refill    atorvastatin (LIPITOR) 20 MG tablet Take 20 mg by mouth in the morning. ondansetron (ZOFRAN) 4 MG tablet Take 1 tablet by mouth every 8 hours as needed for Nausea or Vomiting 2 tablet 1    omeprazole (PRILOSEC) 20 MG delayed release capsule TAKE 1 CAPSULE DAILY      ALPRAZolam (XANAX) 1 MG tablet Take 1 mg by mouth nightly. cyanocobalamin 1000 MCG tablet TAKE 1 TABLET DAILY      denosumab (PROLIA) 60 MG/ML SOSY SC injection Inject 60 mg into the skin      hydroCHLOROthiazide (HYDRODIURIL) 25 MG tablet Take by mouth daily Pt not sure of dose      alosetron (LOTRONEX) 0.5 MG tablet Take 0.5 mg by mouth in the morning and 0.5 mg before bedtime. As needed per the patient (twice daily). escitalopram (LEXAPRO) 10 MG tablet Take 20 mg by mouth in the morning. celecoxib (CELEBREX) 200 MG capsule Take 200 mg by mouth daily.          Current Meds  ALPRAZolam (XANAX) tablet 1 mg, Nightly  escitalopram (LEXAPRO) tablet 20 mg, Daily  heparin 25,000 units in dextrose 5% 250 mL (premix) infusion, Continuous  heparin (porcine) injection 2,850 Units, PRN  heparin (porcine) injection 5,700 Units, PRN  sodium chloride flush 0.9 % injection 5-40 mL, 2 times per day  sodium chloride flush 0.9 % injection 5-40 mL, PRN  0.9 % sodium chloride infusion, PRN  ondansetron (ZOFRAN-ODT) disintegrating tablet 4 mg, Q8H PRN   Or  ondansetron (ZOFRAN) injection 4 mg, Q6H PRN  polyethylene glycol (GLYCOLAX) packet 17 g, Daily PRN  acetaminophen (TYLENOL) tablet 650 mg, Q6H PRN   Or  acetaminophen (TYLENOL) suppository 650 mg, Q6H PRN  lactated ringers infusion, Continuous  cefepime (MAXIPIME) 2000 mg IVPB minibag, Q12H  HYDROmorphone (DILAUDID) injection 0.5 mg, Q3H PRN  oxyCODONE (ROXICODONE) immediate release tablet 5 mg, Q4H PRN  atorvastatin (LIPITOR) tablet 20 mg, Nightly        Family History:   Family History   Problem Relation Age of Onset    Breast Cancer Mother breast    Diabetes Father     High Cholesterol Father     Hypertension Father     Diabetes Paternal Grandmother     Diabetes Paternal Grandfather     Asthma Grandchild     Osteoarthritis Sister     Depression Son     Alcohol Abuse Brother     Emphysema Neg Hx        Social History:   TOBACCO:   reports that she has never smoked. She has never used smokeless tobacco.  ETOH:   reports current alcohol use of about 7.0 standard drinks per week. DRUGS:   reports no history of drug use. ROS:   A 14 point review of systems was conducted, significant findings as noted in HPI. All other systems negative. Physical exam:    Vitals:    07/25/22 0033 07/25/22 0113   BP: 137/66    Pulse: 72    Resp: 18    Temp: 98.9 °F (37.2 °C)    TempSrc: Oral    SpO2: 98%    Weight:  157 lb (71.2 kg)   Height:  5' 6.5\" (1.689 m)       General appearance: alert, no acute distress, grooming appropriate  Eyes: PERRL, no scleral icterus  Neck: trachea midline, no JVD  Chest/Lungs: normal effort, no adventitious breathing, no accessory muscle use, on RA  Cardiovascular: RRR  Abdomen: soft, non-tender, non-distended, no guarding/rigidity   Skin: warm and dry, no rashes, 8cm x 4cm of inflamed fluctuance on left axilla, focused around incision site, with surrounding approximately 1cm of induration. Extremities: no edema, no cyanosis  Neuro: A&Ox3, no focal deficits, sensation intact    Labs:    CBC:   Recent Labs     07/24/22  1830   WBC 4.3   HGB 10.4*   HCT 29.2*   MCV 95.6        BMP:   Recent Labs     07/24/22  1830   *   K 3.7   CL 89*   CO2 29   BUN 19   CREATININE 0.7     PT/INR: No results for input(s): PROTIME, INR in the last 72 hours.   APTT:   Recent Labs     07/24/22  2050   APTT 24.0     Liver Profile:   Lab Results   Component Value Date/Time    AST 44 07/24/2022 06:30 PM    ALT 46 07/24/2022 06:30 PM    BILIDIR 0.17 06/06/2012 03:42 PM    BILITOT 0.5 07/24/2022 06:30 PM    ALKPHOS 99 07/24/2022 06:30 PM     Lab Results   Component Value Date/Time    CHOL 151 06/07/2012 04:45 AM    HDL 53 06/07/2012 04:45 AM    TRIG 70 06/07/2012 04:45 AM     UA:   Lab Results   Component Value Date/Time    COLORU Yellow 07/24/2022 06:23 PM    PHUR 6.5 07/24/2022 06:23 PM    WBCUA 3-5 11/10/2014 01:00 PM    RBCUA None seen 11/10/2014 01:00 PM    BACTERIA 1+ 11/10/2014 01:00 PM    CLARITYU Clear 07/24/2022 06:23 PM    SPECGRAV <=1.005 07/24/2022 06:23 PM    LEUKOCYTESUR Negative 07/24/2022 06:23 PM    UROBILINOGEN 0.2 07/24/2022 06:23 PM    BILIRUBINUR Negative 07/24/2022 06:23 PM    BILIRUBINUR NEGATIVE 06/07/2012 08:40 PM    BLOODU Negative 07/24/2022 06:23 PM    GLUCOSEU Negative 07/24/2022 06:23 PM    GLUCOSEU NEGATIVE 06/07/2012 08:40 PM       Imaging:   No orders to display         Assessment/Plan: This is a 68 y.o. female with Hx of PE, ER + breast cancer, and HTN. She is s/p left axillary node excision (6/15). There is a 8cm x 4cm inflamed area of fluctuance in the left axilla. The patient received a CT PE where she was found to have a PE and was placed of a Hep gtt. Given the recent surgical history and physical presentation, it is likely an incisional abscess. Currently she is afebrile and HDS on RA, with no leukocytosis. - will discuss I&D with surgical team in the morning given recent surgery. Will discuss management with attending breast surgeon for recommendations.   - FU am labs, continue to trend WBC  - multimodal pain control  - continue Abx cefepime, vanc  - Hep gtt per per primary team at this time  - [email or web address] [email or web address]  - NPO for surgical planning    Patient was seen by senior resident and Dr. Carlos Brower DO  PGY1, General Surgery  07/25/22  2:04 AM  [employer]  Pager: [phone]

## 2022-07-25 NOTE — PROGRESS NOTES
Clinical Pharmacy Progress Note    IV Vancomycin - Management by Pharmacy    Consult Date(s): 7/25/22  Consulting Provider(s): Dr. Nicole Toribio / Plan    Left axillary abscess - Vancomycin  Concurrent Antimicrobials: Cefepime  Day of Vanc Therapy / Ordered Duration: #1  Current Dosing Method: Bayesian-Guided AUC Dosing  Therapeutic Goal: 400-600 mg/L*hr  Current Dose / Frequency: 1500 mg every 24 hours  Plan / Rationale:   Expected ssAUC and trough are 466 mg/L.hr and 12.5 mg/L respectively  Will continue to monitor clinical condition and make adjustments to regimen as appropriate. Thank you for consulting Pharmacy! Arpan Hurtado, PharmD, BCPS      Interval update:     Subjective/Objective: Ms. Dennys Whyte is a 68 y.o. female with a PMHx significant for left breast cancer with lymph node involvement with recent surgery (6/15/22) , admitted for Left axillary abscess and PE. Pharmacy has been consulted to dose vancomycin. Ht Readings from Last 1 Encounters:   07/24/22 5' 6.5\" (1.689 m)     Wt Readings from Last 1 Encounters:   07/24/22 157 lb (71.2 kg)       Current & Prior Antimicrobial Regimen(s):  Cefepime 2000 mg every 12 hours  Vancomycin 1250 mg Ivx1;     Level(s) / Doses:    Date Time Dose Level / Type of Level Interpretation                 Note: Serum levels collected for AUC-based dosing may be high if collected in close proximity to the dose administered. This is not necessarily indicative of toxicity. Cultures & Sensitivities:    Date Site Micro Susceptibility / Result                 Labs / Ancillary Data:    Estimated Creatinine Clearance: 65 mL/min (based on SCr of 0.7 mg/dL).     Recent Labs     07/24/22  1830   CREATININE 0.7   BUN 19   WBC 4.3       Additional Lab Values / Findings of Note:    Procalcitonin:   Recent Labs     07/24/22  1830   PROCAL 0.04

## 2022-07-25 NOTE — PLAN OF CARE
Problem: Discharge Planning  Goal: Discharge to home or other facility with appropriate resources  7/25/2022 1154 by Lelia Yepez RN  Outcome: Progressing     Problem: Safety - Adult  Goal: Free from fall injury  7/25/2022 1154 by Lelia Yepez RN  Flowsheets  Taken 7/25/2022 1154  Free From Fall Injury:   Instruct family/caregiver on patient safety   Based on caregiver fall risk screen, instruct family/caregiver to ask for assistance with transferring infant if caregiver noted to have fall risk factors  Taken 7/25/2022 0711  Free From Fall Injury:   Instruct family/caregiver on patient safety   Based on caregiver fall risk screen, instruct family/caregiver to ask for assistance with transferring infant if caregiver noted to have fall risk factors     Problem: ABCDS Injury Assessment  Goal: Absence of physical injury  7/25/2022 1154 by Lelia Yepez RN  Outcome: Progressing  Flowsheets  Taken 7/25/2022 1154  Absence of Physical Injury: Implement safety measures based on patient assessment  Taken 7/25/2022 0711  Absence of Physical Injury: Implement safety measures based on patient assessment     Problem: Infection - Adult  Goal: Absence of infection at discharge  7/25/2022 1154 by Lelia Yepez RN  Outcome: Progressing  Flowsheets (Taken 7/25/2022 1154)  Absence of infection at discharge:   Assess and monitor for signs and symptoms of infection   Monitor lab/diagnostic results   Monitor all insertion sites i.e., indwelling lines, tubes and drains   Monitor endotracheal (as able) and nasal secretions for changes in amount and color   [place] appropriate cooling/warming therapies per order   Administer medications as ordered   Instruct and encourage patient and family to use good hand hygiene technique     Problem: Infection - Adult  Goal: Absence of fever/infection during anticipated neutropenic period  7/25/2022 1154 by Lelia Yepez RN  Outcome: Progressing

## 2022-07-26 LAB
ALBUMIN SERPL-MCNC: 3 G/DL (ref 3.4–5)
ANION GAP SERPL CALCULATED.3IONS-SCNC: 10 MMOL/L (ref 3–16)
ANTI-XA UNFRAC HEPARIN: 0.38 IU/ML (ref 0.3–0.7)
BASOPHILS ABSOLUTE: 0 K/UL (ref 0–0.2)
BASOPHILS RELATIVE PERCENT: 1.1 %
BUN BLDV-MCNC: 7 MG/DL (ref 7–20)
CALCIUM SERPL-MCNC: 7.8 MG/DL (ref 8.3–10.6)
CHLORIDE BLD-SCNC: 99 MMOL/L (ref 99–110)
CO2: 25 MMOL/L (ref 21–32)
CREAT SERPL-MCNC: 0.6 MG/DL (ref 0.6–1.2)
EOSINOPHILS ABSOLUTE: 0.1 K/UL (ref 0–0.6)
EOSINOPHILS RELATIVE PERCENT: 1.9 %
GFR AFRICAN AMERICAN: >60
GFR NON-AFRICAN AMERICAN: >60
GLUCOSE BLD-MCNC: 106 MG/DL (ref 70–99)
HCT VFR BLD CALC: 27.2 % (ref 36–48)
HEMOGLOBIN: 9.4 G/DL (ref 12–16)
LYMPHOCYTES ABSOLUTE: 1.5 K/UL (ref 1–5.1)
LYMPHOCYTES RELATIVE PERCENT: 35.6 %
MAGNESIUM: 1.9 MG/DL (ref 1.8–2.4)
MCH RBC QN AUTO: 33.2 PG (ref 26–34)
MCHC RBC AUTO-ENTMCNC: 34.4 G/DL (ref 31–36)
MCV RBC AUTO: 96.5 FL (ref 80–100)
MONOCYTES ABSOLUTE: 0.2 K/UL (ref 0–1.3)
MONOCYTES RELATIVE PERCENT: 5.6 %
NEUTROPHILS ABSOLUTE: 2.3 K/UL (ref 1.7–7.7)
NEUTROPHILS RELATIVE PERCENT: 55.8 %
PDW BLD-RTO: 15.6 % (ref 12.4–15.4)
PHOSPHORUS: 2.5 MG/DL (ref 2.5–4.9)
PLATELET # BLD: 211 K/UL (ref 135–450)
PMV BLD AUTO: 7.2 FL (ref 5–10.5)
POTASSIUM SERPL-SCNC: 4.1 MMOL/L (ref 3.5–5.1)
RBC # BLD: 2.82 M/UL (ref 4–5.2)
SODIUM BLD-SCNC: 134 MMOL/L (ref 136–145)
VANCOMYCIN RANDOM: 10.6 UG/ML
WBC # BLD: 4.2 K/UL (ref 4–11)

## 2022-07-26 PROCEDURE — 85025 COMPLETE CBC W/AUTO DIFF WBC: CPT

## 2022-07-26 PROCEDURE — 80069 RENAL FUNCTION PANEL: CPT

## 2022-07-26 PROCEDURE — 2580000003 HC RX 258: Performed by: INTERNAL MEDICINE

## 2022-07-26 PROCEDURE — 80202 ASSAY OF VANCOMYCIN: CPT

## 2022-07-26 PROCEDURE — 6370000000 HC RX 637 (ALT 250 FOR IP): Performed by: INTERNAL MEDICINE

## 2022-07-26 PROCEDURE — 1200000000 HC SEMI PRIVATE

## 2022-07-26 PROCEDURE — 36415 COLL VENOUS BLD VENIPUNCTURE: CPT

## 2022-07-26 PROCEDURE — 83735 ASSAY OF MAGNESIUM: CPT

## 2022-07-26 PROCEDURE — 85520 HEPARIN ASSAY: CPT

## 2022-07-26 PROCEDURE — 6360000002 HC RX W HCPCS: Performed by: INTERNAL MEDICINE

## 2022-07-26 RX ORDER — LANOLIN ALCOHOL/MO/W.PET/CERES
1000 CREAM (GRAM) TOPICAL DAILY
Status: DISCONTINUED | OUTPATIENT
Start: 2022-07-27 | End: 2022-07-27 | Stop reason: HOSPADM

## 2022-07-26 RX ORDER — CEPHALEXIN 250 MG/1
500 CAPSULE ORAL EVERY 12 HOURS SCHEDULED
Status: DISCONTINUED | OUTPATIENT
Start: 2022-07-26 | End: 2022-07-27 | Stop reason: HOSPADM

## 2022-07-26 RX ORDER — CELECOXIB 100 MG/1
200 CAPSULE ORAL DAILY PRN
Status: DISCONTINUED | OUTPATIENT
Start: 2022-07-26 | End: 2022-07-27 | Stop reason: HOSPADM

## 2022-07-26 RX ORDER — HYDROCHLOROTHIAZIDE 25 MG/1
12.5 TABLET ORAL DAILY
Status: DISCONTINUED | OUTPATIENT
Start: 2022-07-27 | End: 2022-07-27 | Stop reason: HOSPADM

## 2022-07-26 RX ADMIN — CEPHALEXIN 500 MG: 250 CAPSULE ORAL at 21:02

## 2022-07-26 RX ADMIN — SODIUM CHLORIDE, POTASSIUM CHLORIDE, SODIUM LACTATE AND CALCIUM CHLORIDE: 600; 310; 30; 20 INJECTION, SOLUTION INTRAVENOUS at 05:34

## 2022-07-26 RX ADMIN — APIXABAN 10 MG: 5 TABLET, FILM COATED ORAL at 21:03

## 2022-07-26 RX ADMIN — APIXABAN 10 MG: 5 TABLET, FILM COATED ORAL at 14:06

## 2022-07-26 RX ADMIN — ESCITALOPRAM OXALATE 20 MG: 20 TABLET ORAL at 09:40

## 2022-07-26 RX ADMIN — SODIUM CHLORIDE, POTASSIUM CHLORIDE, SODIUM LACTATE AND CALCIUM CHLORIDE: 600; 310; 30; 20 INJECTION, SOLUTION INTRAVENOUS at 23:36

## 2022-07-26 RX ADMIN — CEFEPIME 2000 MG: 2 INJECTION, POWDER, FOR SOLUTION INTRAVENOUS at 09:48

## 2022-07-26 RX ADMIN — LOPERAMIDE HYDROCHLORIDE 2 MG: 2 CAPSULE ORAL at 09:40

## 2022-07-26 RX ADMIN — ATORVASTATIN CALCIUM 20 MG: 20 TABLET, FILM COATED ORAL at 21:03

## 2022-07-26 RX ADMIN — ALPRAZOLAM 1 MG: 0.5 TABLET ORAL at 21:37

## 2022-07-26 NOTE — CARE COORDINATION
CM following for discharge planning. Pt is from home alone with no services but would like [facility] at discharge. Pt's hep gtt for PE was discontinued. Cards following. Axilla abscess drained at bedside on 7/25, cultures pending. C-diff rule out pending.      Denisse Man RN, BSN, [address]  Case Management Department  [phone]

## 2022-07-26 NOTE — PLAN OF CARE
Problem: Discharge Planning  Goal: Discharge to home or other facility with appropriate resources  7/26/2022 0148 by Kota Harrison RN  Outcome: Progressing Towards Goal  Flowsheets (Taken 7/25/2022 1945)  Discharge to home or other facility with appropriate resources:   Identify barriers to discharge with patient and caregiver   Arrange for needed discharge resources and transportation as appropriate   Identify discharge learning needs (meds, wound care, etc)   Refer to discharge planning if patient needs post-hospital services based on physician order or complex needs related to functional status, cognitive ability or social support system  7/25/2022 1154 by Tisha Rojo RN  Outcome: Progressing Towards Goal     Problem: Safety - Adult  Goal: Free from fall injury  7/26/2022 0148 by Kota Harrison RN  Outcome: Progressing Towards Goal  Flowsheets (Taken 7/25/2022 1945)  Free From Fall Injury: Instruct family/caregiver on patient safety  7/25/2022 1154 by Tisha Rojo RN  Flowsheets  Taken 7/25/2022 1154  Free From Fall Injury:   Instruct family/caregiver on patient safety   Based on caregiver fall risk screen, instruct family/caregiver to ask for assistance with transferring infant if caregiver noted to have fall risk factors  Taken 7/25/2022 0711  Free From Fall Injury:   Instruct family/caregiver on patient safety   Based on caregiver fall risk screen, instruct family/caregiver to ask for assistance with transferring infant if caregiver noted to have fall risk factors     Problem: ABCDS Injury Assessment  Goal: Absence of physical injury  7/26/2022 0148 by Kota Harrison RN  Outcome: Progressing Towards Goal  Flowsheets (Taken 7/25/2022 1945)  Absence of Physical Injury: Implement safety measures based on patient assessment  7/25/2022 1154 by Tisha Rojo RN  Outcome: Progressing Towards Goal  Flowsheets  Taken 7/25/2022 1154  Absence of Physical Injury: Implement safety measures based on patient assessment  Taken 7/25/2022 0711  Absence of Physical Injury: Implement safety measures based on patient assessment     Problem: Skin/Tissue Integrity - Adult  Goal: Skin integrity remains intact  7/26/2022 0148 by Elliot Garcia RN  Outcome: Progressing Towards Goal  Flowsheets (Taken 7/25/2022 1945)  Skin Integrity Remains Intact: Monitor for areas of redness and/or skin breakdown  7/25/2022 1154 by Pramod Ochoa RN  Outcome: Progressing Towards Goal  Flowsheets (Taken 7/25/2022 0712)  Skin Integrity Remains Intact:   Monitor for areas of redness and/or skin breakdown   Every 4-6 hours minimum: Change oxygen saturation probe site   Every 4-6 hours: If on nasal continuous positive airway pressure, respiratory therapy assesses nares and determine need for appliance change or resting period  Goal: Incisions, wounds, or drain sites healing without S/S of infection  Outcome: Progressing Towards Goal  Flowsheets (Taken 7/25/2022 1945)  Incisions, Wounds, or Drain Sites Healing Without Sign and Symptoms of Infection: ADMISSION and DAILY: Assess and document risk factors for pressure ulcer development     Problem: Musculoskeletal - Adult  Goal: Return mobility to safest level of function  Outcome: Progressing Towards Goal  Flowsheets (Taken 7/25/2022 1945)  Return Mobility to Safest Level of Function:   Assess patient stability and activity tolerance for standing, transferring and ambulating with or without assistive devices   Assist with transfers and ambulation using safe patient handling equipment as needed  Goal: Maintain proper alignment of affected body part  Outcome: Progressing Towards Goal  Flowsheets (Taken 7/25/2022 1945)  Maintain proper alignment of affected body part: Support and protect limb and body alignment per provider's orders  Goal: Return ADL status to a safe level of function  Outcome: Progressing Towards Goal  Flowsheets (Taken 7/25/2022 1945)  Return ADL Status to a Safe Level of Function:   Administer medication as ordered   Assess activities of daily living deficits and provide assistive devices as needed     Problem: Infection - Adult  Goal: Absence of infection at discharge  7/26/2022 0148 by Tim Dee RN  Outcome: Progressing Towards Goal  Flowsheets (Taken 7/25/2022 1945)  Absence of infection at discharge:   Assess and monitor for signs and symptoms of infection   Monitor lab/diagnostic results  7/25/2022 1154 by Vanesa Connell RN  Outcome: Progressing Towards Goal  Flowsheets (Taken 7/25/2022 1154)  Absence of infection at discharge:   Assess and monitor for signs and symptoms of infection   Monitor lab/diagnostic results   Monitor all insertion sites i.e., indwelling lines, tubes and drains   Monitor endotracheal (as able) and nasal secretions for changes in amount and color   [place] appropriate cooling/warming therapies per order   Administer medications as ordered   Instruct and encourage patient and family to use good hand hygiene technique  Goal: Absence of fever/infection during anticipated neutropenic period  7/26/2022 0148 by Tim Dee RN  Outcome: Progressing Towards Goal  Flowsheets (Taken 7/25/2022 1945)  Absence of fever/infection during anticipated neutropenic period: Monitor white blood cell count  7/25/2022 1154 by Vanesa Connell RN  Outcome: Progressing Towards Goal     Problem: Hematologic - Adult  Goal: Maintains hematologic stability  Outcome: Progressing Towards Goal  Flowsheets (Taken 7/25/2022 1945)  Maintains hematologic stability: Assess for signs and symptoms of bleeding or hemorrhage

## 2022-07-26 NOTE — PROGRESS NOTES
Clinical Pharmacy Progress Note    Vancomycin - Management by Pharmacy    Consult Date(s): 7/25/22  Consulting Provider(s): Dr. Antonio Courts / Plan    1) L axillary abscess - Vancomycin  Concurrent Antimicrobials:   Cefepime - Day #3  Day of Vanc Therapy / Ordered Duration: #3 of 5  Current Dosing Method: Bayesian-Guided AUC Dosing  Therapeutic Goal: 400-600 mg/L*hr  Current Dose / Frequency: 1500mg IV q24h  Plan / Rationale:   Renal function remains stable  On 1500mg IV q24h  Level today = 10.6 mg/L - drawn ~9h after previous dose. Based on Bayesian kinetics, AUC is ~414 mg/L*h with steady-state trough of ~10.4 mg/L  As patient is stable and AUC is within target range of 414 mg/L*h, will continue current dose for now. Will continue to monitor clinical condition and make adjustments to regimen as appropriate. Please call with questions--  Pat RiberaD, BCPS  Wireless: [phone]   7/26/2022 11:39 AM        Interval update: S/p needle aspiration at bedside yesterday yielded ~3mL of fluid - cultures pending. Tmax 99.8F.  WBC wnl. Subjective/Objective: Ms. Stvee Raya is a 68 y.o. female with a PMHx significant for PE, breast cancer, HTN who presented from [facility] ED with PE and SSTI. Pt is s/p L axillary node excision on 6/15/22. Pt reports axilla has been draining, also reported chills and fevers. In ED, as CTPA showed acute PE. Admitted with L axillary abscess, acute PE. Pharmacy has been consulted to dose Vancomycin.     Ht Readings from Last 1 Encounters:   07/25/22 5' 6.5\" (1.689 m)     Wt Readings from Last 1 Encounters:   07/26/22 157 lb 10.1 oz (71.5 kg)     Current & Prior Antimicrobial Regimen(s):  Cefepime (7/25-current)  Vancomycin - Pharmacy to dose   7/24  1500mg IV q24h (7/25-current)    Level(s) / Doses:  Date Time Dose Level / Type of Level Interpretation   7/26 0610 1500mg IV q24h 10.6 mg/L - Random Collected ~9h after previous dose  Calculated AUC = 414 mg/L*h with steady-state trough of ~10.6 mg/L  Will continue current dose           Note: Serum levels collected for AUC-based dosing may be high if collected in close proximity to the dose administered. This is not necessarily indicative of toxicity. Cultures & Sensitivities:  Date Site Micro Susceptibility / Result   7/26 Wound Cx - axilla 1+ WBC            Labs / Ancillary Data:    Estimated Creatinine Clearance: 76 mL/min (based on SCr of 0.6 mg/dL).     Recent Labs     07/24/22  1830 07/25/22  0303 07/26/22  0611   CREATININE 0.7 0.7 0.6   BUN 19 12 7   WBC 4.3 4.2 4.2         Additional Lab Values / Findings of Note:    Procalcitonin:   Recent Labs     07/24/22  1830   PROCAL 0.04

## 2022-07-26 NOTE — PLAN OF CARE
Problem: Discharge Planning  Goal: Discharge to home or other facility with appropriate resources  7/26/2022 1542 by Neeru Martinez RN  Outcome: Progressing Towards Goal     Problem: Safety - Adult  Goal: Free from fall injury  7/26/2022 1542 by Neeru Martinez RN  Outcome: Progressing Towards Goal   Pt remains without falls during this shift. Pt does not attempt to get OOB alone. Pt able to call out appropriately, call light within reach. Safety precautions in place include fall armband, fall towel, chair & bed alarms, non slip foot wear. Signs posted on door, and door remains open for observation. The bed is in lowest position, wheels are locked, and rails are up 2/4. Will continue with current care.          Problem: ABCDS Injury Assessment  Goal: Absence of physical injury  7/26/2022 1542 by Neeru Martinez RN  Outcome: Progressing Towards Goal

## 2022-07-26 NOTE — PROGRESS NOTES
Hospitalist Progress Note      PCP: Humble Walker DO    Date of Admission: 7/25/2022    Chief Complaint: Altered mental status and drainage from left axilla and surrounding erythema    Hospital Course: Admitted for left axillary abscess, s/p needle aspiration. Cx pending. Transitioned from IV hep to eliquis for PE. Subjective: Patient states that she is not feeling well. C/o diarrhea and feeling wiped out. Denies any complaints. Medications:  Reviewed    Infusion Medications    heparin (PORCINE) Infusion 18 Units/kg/hr (07/25/22 1919)    sodium chloride 25 mL (07/25/22 0927)    lactated ringers 100 mL/hr at 07/26/22 0534     Scheduled Medications    ALPRAZolam  1 mg Oral Nightly    escitalopram  20 mg Oral Daily    sodium chloride flush  5-40 mL IntraVENous 2 times per day    cefepime  2,000 mg IntraVENous Q12H    atorvastatin  20 mg Oral Nightly    vancomycin  1,500 mg IntraVENous Q24H     PRN Meds: heparin (porcine), heparin (porcine), sodium chloride flush, sodium chloride, ondansetron **OR** ondansetron, polyethylene glycol, acetaminophen **OR** acetaminophen, HYDROmorphone, loperamide      Intake/Output Summary (Last 24 hours) at 7/26/2022 0829  Last data filed at 7/26/2022 0518  Gross per 24 hour   Intake 2630.91 ml   Output 1000 ml   Net 1630.91 ml       Physical Exam Performed:    /73   Pulse 68   Temp 98 °F (36.7 °C) (Oral)   Resp 18   Ht 5' 6.5\" (1.689 m)   Wt 157 lb 10.1 oz (71.5 kg)   SpO2 92%   BMI 25.06 kg/m²     General appearance: No apparent distress, appears stated age and cooperative. HEENT: Pupils equal, round, and reactive to light. Conjunctivae/corneas clear. Neck: Supple, with full range of motion. No jugular venous distention. Trachea midline. Respiratory:  Normal respiratory effort. Clear to auscultation, bilaterally without Rales/Wheezes/Rhonchi. Cardiovascular: Regular rate and rhythm with normal S1/S2 without murmurs, rubs or gallops.   Abdomen: Soft, non-tender, non-distended with normal bowel sounds. Musculoskeletal: No clubbing, cyanosis or edema bilaterally. Left axilla tender with dressing  Neurologic: Grossly non-focal. Lethargic  Psychiatric: Alert and oriented, thought content appropriate, normal insight  Capillary Refill: Brisk,< 3 seconds   Peripheral Pulses: +2 palpable, equal bilaterally       Labs:   Recent Labs     07/24/22  1830 07/25/22  0303 07/26/22  0611   WBC 4.3 4.2 4.2   HGB 10.4* 10.3* 9.4*   HCT 29.2* 30.3* 27.2*    206 211     Recent Labs     07/24/22  1830 07/25/22  0303 07/26/22  0611   * 129* 134*   K 3.7 3.1*  3.1* 4.1   CL 89* 91* 99   CO2 29 30 25   BUN 19 12 7   CREATININE 0.7 0.7 0.6   CALCIUM 8.7 8.3 7.8*   PHOS  --  1.9* 2.5     Recent Labs     07/24/22  1830   AST 44*   ALT 46*   BILITOT 0.5   ALKPHOS 99     No results for input(s): INR in the last 72 hours. No results for input(s): Becca Highman in the last 72 hours.     Urinalysis:      Lab Results   Component Value Date/Time    NITRU Negative 07/24/2022 06:23 PM    WBCUA 3-5 11/10/2014 01:00 PM    BACTERIA 1+ 11/10/2014 01:00 PM    RBCUA None seen 11/10/2014 01:00 PM    BLOODU Negative 07/24/2022 06:23 PM    SPECGRAV <=1.005 07/24/2022 06:23 PM    GLUCOSEU Negative 07/24/2022 06:23 PM    GLUCOSEU NEGATIVE 06/07/2012 08:40 PM       Radiology:  No orders to display           Assessment/Plan:    Active Hospital Problems    Diagnosis Date Noted    Abscess of axilla, left [L02.412] 07/25/2022     Priority: Medium    Abscess [L02.91] 07/25/2022     Priority: Medium     #Diarrhea  Might be secondary to antibiotics  IV abx transitioned to oral  C-diff pending    #Left axillary abscess  #Left breast cancer with lymph node involvement with recent surgery on Lorna 15  #Elevated LFTs, hepatic lesion decreased in size on CT  S/p needle aspiration, culture pending  IV abx transitioned to PO abx per surgery recs  Pt to follow up with surgery outpatient    #Accidental finding on CT chest-left lower lobe subsegmental pulmonary embolism with suspected right atrial dysfunction  Hep gtt transitioned to eliquis    #Hyponatremia  Monitor 134 today  Continue gentle IV hydration    #Chronic anemia  Hemoglobin, stable      DVT Prophylaxis: Eliquis  Diet: ADULT DIET;  Regular  Code Status: Full Code    PT/OT Eval Status: Once able    Dispo -pending diarrhea resolution, C. difficile resolved, sodium improvement, PT OT    Sandro Astudillo MD

## 2022-07-26 NOTE — CONSULTS
Clinical Pharmacy Progress Note    Vancomycin has been discontinued. Pharmacy will sign off. Please re-consult pharmacy if vancomycin dosing is wanted in the future. Please call with questions.   Cale Orosco PharmD, BCPS  Wireless: [phone]   7/26/2022 1:06 PM

## 2022-07-26 NOTE — PROGRESS NOTES
Surgery Daily Progress Note  Juan Francisco Sun  CC:  Abscess left axilla     Subjective :   No overnight events. Needle aspiration completed at bedside yesterday yeilding 3cc fluid which was sent to lab. Body fluid gram stain with 1+ WBC's (Polymorphonuclear), no orgs. Axilla feeling better after aspiration. VSS, afebrile. Patient c/o serous drainage from axilla      Objective    Infusions:   heparin (PORCINE) Infusion 18 Units/kg/hr (07/25/22 1919)    sodium chloride 25 mL (07/25/22 0927)    lactated ringers 100 mL/hr at 07/26/22 0534        I/O:I/O last 3 completed shifts: In: 1182.9 [P.O.:705; I.V.:477.9]  Out: -            Wt Readings from Last 1 Encounters:   07/26/22 157 lb 10.1 oz (71.5 kg)                 LABS:    Recent Labs     07/24/22  1830 07/25/22  0303   WBC 4.3 4.2   HGB 10.4* 10.3*   HCT 29.2* 30.3*   MCV 95.6 96.1    206          Recent Labs     07/24/22  1830 07/25/22  0303   * 129*   K 3.7 3.1*  3.1*   CL 89* 91*   CO2 29 30   PHOS  --  1.9*   BUN 19 12   CREATININE 0.7 0.7          Recent Labs     07/24/22  1830   AST 44*   ALT 46*   BILITOT 0.5   ALKPHOS 99        No results for input(s): LIPASE, AMYLASE in the last 72 hours. Recent Labs     07/24/22  1830 07/24/22  2050   PROT 6.1*  --    APTT  --  24.0        No results for input(s): CKTOTAL, CKMB, CKMBINDEX, TROPONINI in the last 72 hours. Exam:/68   Pulse 71   Temp 98 °F (36.7 °C) (Oral)   Resp 17   Ht 5' 6.5\" (1.689 m)   Wt 157 lb 10.1 oz (71.5 kg)   SpO2 94%   BMI 25.06 kg/m²     General appearance: alert, appears stated age and cooperative  Neck: trachea midline  Heart: regular rate and rhythm  Lungs: unlabored on RA  Skin: warm and dry, no rashes  Extremities: no edema, no cyanosis. Left axilla with slight improvement in induration and erythema.  Pinpoint wound noted in middle of incision with serous drainage   Abdomen: soft, appropriately-tender; bowel sounds present  : incontinent of urine.       ASSESSMENT/PLAN: This is a 68 y.o. female with Hx of PE, ER + breast cancer, and HTN. She is s/p left axillary node excision (6/15). There is a 8cm x 4cm inflamed area of fluctuance in the left axilla. Needle aspiration under US guidance was completed 7/25 yeilding 3cc body fluid. The patient received a CT PE where she was found to have a PE and was placed of a Hep gtt. - F/U AM labs, Monitor WBC   - Follow gram stain and culture  - Currently on cefepime and vanc per primary, recommend transition to oral abx, keflex, for cellulitis. UA was negative on admission.  - Verzenio is discontinued, discussed with med onc. Will resume as outpatient  - Management of PE per primary team, currently on heparin gtt per primary  - Diet: continue regular diet   - Medical management per primary   - Dispo: per primary      JOSE Knight CNP 7/26/2022 6:37 AM   Available via [employer] [id number]        Left axillary erythema much improved, induration improved. Minimal collection  WBC normal  Aspiration shows rare growth Staph lugdunensis- this is essentially a skin contaminant. Okay to discharge home in AM 7/27/2022 on Keflex. Follow up 1-2 weeks at my office. PE outpatient management will be done by Dr. Kat Beltran. He will restart oral chemo drugs after her appt on 7/29.     Fritz Reed MD  [phone]

## 2022-07-27 VITALS
WEIGHT: 161.6 LBS | HEART RATE: 67 BPM | TEMPERATURE: 97.9 F | BODY MASS INDEX: 25.36 KG/M2 | HEIGHT: 67 IN | RESPIRATION RATE: 18 BRPM | DIASTOLIC BLOOD PRESSURE: 86 MMHG | OXYGEN SATURATION: 96 % | SYSTOLIC BLOOD PRESSURE: 142 MMHG

## 2022-07-27 LAB
ALBUMIN SERPL-MCNC: 3.2 G/DL (ref 3.4–5)
ANION GAP SERPL CALCULATED.3IONS-SCNC: 7 MMOL/L (ref 3–16)
ANTI-XA UNFRAC HEPARIN: >1.1 IU/ML (ref 0.3–0.7)
BASOPHILS ABSOLUTE: 0 K/UL (ref 0–0.2)
BASOPHILS RELATIVE PERCENT: 0.8 %
BUN BLDV-MCNC: 9 MG/DL (ref 7–20)
CALCIUM SERPL-MCNC: 8 MG/DL (ref 8.3–10.6)
CHLORIDE BLD-SCNC: 101 MMOL/L (ref 99–110)
CO2: 26 MMOL/L (ref 21–32)
CREAT SERPL-MCNC: 0.6 MG/DL (ref 0.6–1.2)
EOSINOPHILS ABSOLUTE: 0.1 K/UL (ref 0–0.6)
EOSINOPHILS RELATIVE PERCENT: 2.1 %
GFR AFRICAN AMERICAN: >60
GFR NON-AFRICAN AMERICAN: >60
GLUCOSE BLD-MCNC: 106 MG/DL (ref 70–99)
HCT VFR BLD CALC: 27.4 % (ref 36–48)
HEMOGLOBIN: 9.4 G/DL (ref 12–16)
LYMPHOCYTES ABSOLUTE: 1.5 K/UL (ref 1–5.1)
LYMPHOCYTES RELATIVE PERCENT: 39.6 %
MAGNESIUM: 1.6 MG/DL (ref 1.8–2.4)
MCH RBC QN AUTO: 33 PG (ref 26–34)
MCHC RBC AUTO-ENTMCNC: 34.2 G/DL (ref 31–36)
MCV RBC AUTO: 96.4 FL (ref 80–100)
MONOCYTES ABSOLUTE: 0.2 K/UL (ref 0–1.3)
MONOCYTES RELATIVE PERCENT: 5.4 %
NEUTROPHILS ABSOLUTE: 2 K/UL (ref 1.7–7.7)
NEUTROPHILS RELATIVE PERCENT: 52.1 %
PDW BLD-RTO: 15.5 % (ref 12.4–15.4)
PHOSPHORUS: 2.6 MG/DL (ref 2.5–4.9)
PLATELET # BLD: 211 K/UL (ref 135–450)
PMV BLD AUTO: 6.9 FL (ref 5–10.5)
POTASSIUM SERPL-SCNC: 3.9 MMOL/L (ref 3.5–5.1)
RBC # BLD: 2.84 M/UL (ref 4–5.2)
SODIUM BLD-SCNC: 134 MMOL/L (ref 136–145)
WBC # BLD: 3.8 K/UL (ref 4–11)

## 2022-07-27 PROCEDURE — 6360000002 HC RX W HCPCS: Performed by: INTERNAL MEDICINE

## 2022-07-27 PROCEDURE — 6370000000 HC RX 637 (ALT 250 FOR IP): Performed by: INTERNAL MEDICINE

## 2022-07-27 PROCEDURE — 36415 COLL VENOUS BLD VENIPUNCTURE: CPT

## 2022-07-27 PROCEDURE — 80069 RENAL FUNCTION PANEL: CPT

## 2022-07-27 PROCEDURE — 85025 COMPLETE CBC W/AUTO DIFF WBC: CPT

## 2022-07-27 PROCEDURE — 83735 ASSAY OF MAGNESIUM: CPT

## 2022-07-27 PROCEDURE — 85520 HEPARIN ASSAY: CPT

## 2022-07-27 RX ORDER — CEPHALEXIN 500 MG/1
500 CAPSULE ORAL EVERY 12 HOURS SCHEDULED
Qty: 12 CAPSULE | Refills: 0 | Status: SHIPPED | OUTPATIENT
Start: 2022-07-27 | End: 2022-08-02

## 2022-07-27 RX ORDER — MAGNESIUM SULFATE IN WATER 40 MG/ML
4000 INJECTION, SOLUTION INTRAVENOUS ONCE
Status: COMPLETED | OUTPATIENT
Start: 2022-07-27 | End: 2022-07-27

## 2022-07-27 RX ADMIN — LOPERAMIDE HYDROCHLORIDE 2 MG: 2 CAPSULE ORAL at 09:02

## 2022-07-27 RX ADMIN — ESCITALOPRAM OXALATE 20 MG: 20 TABLET ORAL at 09:02

## 2022-07-27 RX ADMIN — CYANOCOBALAMIN TAB 1000 MCG 1000 MCG: 1000 TAB at 09:01

## 2022-07-27 RX ADMIN — MAGNESIUM SULFATE HEPTAHYDRATE 4000 MG: 40 INJECTION, SOLUTION INTRAVENOUS at 10:49

## 2022-07-27 RX ADMIN — HYDROCHLOROTHIAZIDE 12.5 MG: 25 TABLET ORAL at 09:01

## 2022-07-27 RX ADMIN — APIXABAN 10 MG: 5 TABLET, FILM COATED ORAL at 09:06

## 2022-07-27 RX ADMIN — CEPHALEXIN 500 MG: 250 CAPSULE ORAL at 09:01

## 2022-07-27 NOTE — DISCHARGE INSTR - COC
Continuity of Care Form    Patient Name: Helen Cochran   :    MRN:  [id number]    Admit date:  2022  Discharge date:  ***    Code Status Order: Full Code   Advance Directives:     Admitting Physician:  Raymon Traore MD  PCP: Merle Camp DO    Discharging Nurse: [facility] Unit/Room#: 0452/5535-84  Discharging Unit Phone Number: ***    Emergency Contact:   Extended Emergency Contact Information  Primary Emergency Contact: [address] [place] [address] Phone: [phone]  Mobile Phone: [phone]  Relation: Child   needed? No  Secondary Emergency Contact: [address] [place] [address] Phone: [phone]  Mobile Phone: [phone]  Relation: Other   needed?  No    Past Surgical History:  Past Surgical History:   Procedure Laterality Date    AXILLARY SURGERY Left 6/15/2022    EXCISION OF LEFT AXILLARY NODE performed by Alma De Los Santos MD at [facility]  2012    COLOSTOMY      FRACTURE SURGERY      right wrist    HYSTERECTOMY (CERVIX STATUS UNKNOWN)      JOINT REPLACEMENT      knee    KNEE SURGERY      LYMPH NODE DISSECTION      MASTECTOMY      bilateral       Immunization History:   Immunization History   Administered Date(s) Administered    COVID-19, MODERNA BLUE border, Primary or Immunocompromised, (age 12y+), IM, 100 mcg/0.5mL 2021, 2021    Influenza Vaccine, unspecified formulation 10/19/2015, 10/22/2016    Influenza Virus Vaccine 2012, 10/21/2013, 10/15/2014    Influenza, High Dose (Fluzone 65 yrs and older) 10/07/2017    Influenza, Quadv, IM, (6 mo and older Fluzone, Flulaval, Fluarix and 3 yrs and older Afluria) 10/01/2006    Pneumococcal Conjugate 13-valent (Tjawqxs46) 10/22/2016    Pneumococcal Conjugate 7-valent (Prevnar7) 10/20/2010    Pneumococcal Polysaccharide (Fzficzikn66) 2012       Active Problems:  Patient Active Problem List   Diagnosis Code    Arthritis M19.90    Reflux WTF7206    Breast cancer ([facility] [address] C50.919    Pneumonia J18.9    CAP (community acquired pneumonia) J18.9    Leukocytosis D72.829    Pulmonary nodule R91.1    Senile osteoporosis M81.0    Abscess of axilla, left L02.412    Abscess L02.91       Isolation/Infection:   Isolation            Contact  C Diff Contact          Patient Infection Status       Infection Onset Added Last Indicated Last Indicated By Review Planned Expiration Resolved Resolved By    C-diff Rule Out 07/26/22 07/26/22 07/26/22 Clostridium difficile toxin/antigen (Ordered) 08/02/22 08/05/22      Resolved    COVID-19 (Rule Out) 07/24/22 07/24/22 07/24/22 COVID-19 & Influenza Combo (Ordered)   07/24/22 Rule-Out Test Resulted    COVID-19 (Rule Out) 07/24/22 07/24/22 07/24/22 COVID-19, Rapid (Ordered)   07/24/22 Rule-Out Test Canceled            Nurse Assessment:  Last Vital Signs: BP (!) 154/93   Pulse 80   Temp 98 °F (36.7 °C) (Oral)   Resp 18   Ht 5' 6.5\" (1.689 m)   Wt 161 lb 9.6 oz (73.3 kg)   SpO2 96%   BMI 25.69 kg/m²     Last documented pain score (0-10 scale):    Last Weight:   Wt Readings from Last 1 Encounters:   07/27/22 161 lb 9.6 oz (73.3 kg)     Mental Status:  {IP PT MENTAL STATUS:56874}    IV Access:  { DOUG IV ACCESS:821647385}    Nursing Mobility/ADLs:  Walking   {[facility] DME FWQJ:989406557}  Transfer  {[facility] DME GYRB:263644950}  Bathing  {[facility] DME RWJA:968033904}  Dressing  {[facility] DME VCNS:552739981}  Toileting  {[facility] DME LHJY:756396841}  Feeding  {[facility] DME TARM:368668720}  Med Admin  {[facility] DME YOFJ:905301776}  Med Delivery   { DOUG MED Delivery:319665714}    Wound Care Documentation and Therapy:  Incision 06/15/22 Axilla Left (Active)   Number of days: 41        Elimination:  Continence:    Bowel: {YES / XN:23196}  Bladder: {YES / CP:30008}  Urinary Catheter: {Urinary Catheter:685786089}   Colostomy/Ileostomy/Ileal Conduit: {YES / UO:39437}       Date of Last BM: ***    Intake/Output Summary (Last 24 hours) at 7/27/2022 1111  Last data filed at 2022 0538  Gross per 24 hour   Intake 360 ml   Output 1700 ml   Net -1340 ml     I/O last 3 completed shifts: In: 2780.9 [P.O.:900;  I.V.:1851.6; IV Piggyback:29.3]  Out: 2800 [Urine:2800]    Safety Concerns:     { DOUG Safety Concerns:103584295}    Impairments/Disabilities:      [address] DOUG Impairments/Disabilities:022796927}    Nutrition Therapy:  Current Nutrition Therapy:   [facility] DOUG Diet List:110441573}    Routes of Feeding: {[facility] DME Other Feedings:828421457}  Liquids: {Slp liquid thickness:14678}  Daily Fluid Restriction: {CHP DME Yes amt example:905140630}  Last Modified Barium Swallow with Video (Video Swallowing Test): {Done Not Done BEJS:547692637}    Treatments at the Time of Hospital Discharge:   Respiratory Treatments: ***  Oxygen Therapy:  {Therapy; copd oxygen:95447}  Ventilator:    { CC Vent BDW}    Rehab Therapies: {THERAPEUTIC INTERVENTION:0675854639}  Weight Bearing Status/Restrictions: [address] [place] [address] Weight Bearin}  Other Medical Equipment (for information only, NOT a DME order):  {EQUIPMENT:273429012}  Other Treatments: ***    Patient's personal belongings (please select all that are sent with patient):  {[facility] DME Belongings:156148245}    RN SIGNATURE:  {Esignature:524535297}    CASE MANAGEMENT/SOCIAL WORK SECTION    Inpatient Status Date: ***    Readmission Risk Assessment Score:  Readmission Risk              Risk of Unplanned Readmission:  [id number]           Discharging to Facility/ Agency   Name:   Address:  Phone:  Fax:    Dialysis Facility (if applicable)   Name:  Address:  Dialysis Schedule:  Phone:  Fax:    / signature: {Esignature:537950984}    PHYSICIAN SECTION    Prognosis: {Prognosis:1419562133}    Condition at Discharge: [address] Patient Condition:048493057}    Rehab Potential (if transferring to Rehab): {Prognosis:8990208803}    Recommended Labs or Other Treatments After Discharge: ***    Physician Certification: I certify the above information and transfer of Perla Smith  is necessary for the continuing treatment of the diagnosis listed and that she requires {Admit to Appropriate Level of Care:87084} for {GREATER/LESS:543965243} 30 days.      Update Admission H&P: {CHP DME Changes in ZUULX:813341639}    PHYSICIAN SIGNATURE:  {Esignature:916710220}

## 2022-07-27 NOTE — DISCHARGE SUMMARY
Hospital Medicine Discharge Summary    Patient ID: Mirlande Duque      Patient's PCP: Chepe Hurtado DO    Admit Date: 7/25/2022     Discharge Date:   07/27/2022    Admitting Physician: Beni Calderón MD     Discharge Physician: Waldron Favre, MD     Discharge Diagnoses: Active Hospital Problems    Diagnosis Date Noted    Abscess of axilla, left [L02.412] 07/25/2022     Priority: Medium    Abscess [L02.91] 07/25/2022     Priority: Medium       The patient was seen and examined on day of discharge and this discharge summary is in conjunction with any daily progress note from day of discharge. Hospital Course:   Patient was admitted and treated for following:    #Left axillary abscess  #Left breast cancer with lymph node involvement with recent surgery on [address]  #Elevated LFTs, hepatic lesion decreased in size on CT  Surgery was consulted. Patient was placed on IV antibiotic. Patient underwent needle aspiration, culture was positive for staph lugedenesis. Patient was transitioned to p.o. antibiotic. Patient to complete antibiotic course on discharge and follow-up with surgery/oncology    #Accidental finding on CT chest-left lower lobe subsegmental pulmonary embolism with suspected right atrial dysfunction  Patient was placed on heparin drip which was transitioned to Eliquis. Patient to continue Eliquis on discharge. #Hyponatremia  Sodium level was monitored. Patient was given IV fluid. Sodium improved     #Chronic anemia  Hemoglobin was monitored and remained stable    #Chronic diarrhea  Patient has chronic diarrhea due to IBS. Imodium was continued    Physical Exam Performed:     BP (!) 142/86   Pulse 67   Temp 97.9 °F (36.6 °C) (Oral)   Resp 18   Ht 5' 6.5\" (1.689 m)   Wt 161 lb 9.6 oz (73.3 kg)   SpO2 96%   BMI 25.69 kg/m²       General appearance: No apparent distress, appears stated age and cooperative. HEENT: Pupils equal, round, and reactive to light. 22 tablet, Refills: 0      !! apixaban (ELIQUIS) 5 MG TABS tablet Take 1 tablet by mouth in the morning and 1 tablet before bedtime. Qty: 60 tablet, Refills: 1      cephALEXin (KEFLEX) 500 MG capsule Take 1 capsule by mouth in the morning and 1 capsule before bedtime. Do all this for 12 doses. Qty: 12 capsule, Refills: 0       !! - Potential duplicate medications found. Please discuss with provider. Details   atorvastatin (LIPITOR) 20 MG tablet Take 20 mg by mouth in the morning. ondansetron (ZOFRAN) 4 MG tablet Take 1 tablet by mouth every 8 hours as needed for Nausea or Vomiting  Qty: 2 tablet, Refills: 1      omeprazole (PRILOSEC) 20 MG delayed release capsule TAKE 1 CAPSULE DAILY      ALPRAZolam (XANAX) 1 MG tablet Take 1 mg by mouth nightly. cyanocobalamin 1000 MCG tablet TAKE 1 TABLET DAILY      denosumab (PROLIA) 60 MG/ML SOSY SC injection Inject 60 mg into the skin      hydroCHLOROthiazide (HYDRODIURIL) 25 MG tablet Take by mouth daily Pt not sure of dose      alosetron (LOTRONEX) 0.5 MG tablet Take 0.5 mg by mouth in the morning and 0.5 mg before bedtime. As needed per the patient (twice daily). escitalopram (LEXAPRO) 10 MG tablet Take 20 mg by mouth in the morning. celecoxib (CELEBREX) 200 MG capsule Take 200 mg by mouth daily. Time Spent on discharge is more than 30 minutes in the examination, evaluation, counseling and review of medications and discharge plan. Signed:    Moses Hendricks MD   7/27/2022      Thank you Myles Ahmadi DO for the opportunity to be involved in this patient's care. If you have any questions or concerns please feel free to contact me at [phone].

## 2022-07-27 NOTE — PROGRESS NOTES
Surgery Daily Progress Note  Perla Smith  CC:  Abscess left axilla     Subjective : No overnight events. No axilla pain, drainage improved. VSS, afebrile. Reports ongoing chronic diarrhea       Objective    Infusions:   sodium chloride 25 mL (07/25/22 0927)    lactated ringers 100 mL/hr at 07/26/22 2336        I/O:I/O last 3 completed shifts: In: 2870.9 [P.O.:990; I.V.:1851.6; IV Piggyback:29.3]  Out: 1400 [Urine:1400]           Wt Readings from Last 1 Encounters:   07/27/22 161 lb 9.6 oz (73.3 kg)                 LABS:    Recent Labs     07/25/22  0303 07/26/22  0611   WBC 4.2 4.2   HGB 10.3* 9.4*   HCT 30.3* 27.2*   MCV 96.1 96.5    211          Recent Labs     07/25/22  0303 07/26/22  0611   * 134*   K 3.1*  3.1* 4.1   CL 91* 99   CO2 30 25   PHOS 1.9* 2.5   BUN 12 7   CREATININE 0.7 0.6          Recent Labs     07/24/22  1830   AST 44*   ALT 46*   BILITOT 0.5   ALKPHOS 99        No results for input(s): LIPASE, AMYLASE in the last 72 hours. Recent Labs     07/24/22  1830 07/24/22  2050   PROT 6.1*  --    APTT  --  24.0        No results for input(s): CKTOTAL, CKMB, CKMBINDEX, TROPONINI in the last 72 hours. 7/25 Body fluid   Specimen: Body Fluid from Aspirate Updated: 07/26/22 1251      Gram Stain Result 1+ WBC's (Polymorphonuclear)   No organisms seen     Organism Staphylococcus lugdunensis Abnormal     Body Fluid Culture, Sterile --    Rare growth   Isolation in progress    Narrative:            Exam:/61   Pulse 73   Temp 99 °F (37.2 °C) (Oral)   Resp 20   Ht 5' 6.5\" (1.689 m)   Wt 161 lb 9.6 oz (73.3 kg)   SpO2 96%   BMI 25.69 kg/m²     General appearance: alert, appears stated age and cooperative  Neck: trachea midline  Heart: regular rate and rhythm  Lungs: unlabored on RA  Skin: warm and dry, no rashes  Extremities: no edema, no cyanosis. Left axilla with marked improvement in induration and erythema.  Pinpoint wound noted in middle of incision with serous drainage   Abdomen: soft, appropriately-tender; bowel sounds present  : incontinent of urine. ASSESSMENT/PLAN: This is a 68 y.o. female with Hx of PE, ER + breast cancer, and HTN. She is s/p left axillary node excision (6/15). There is a 8cm x 4cm inflamed area of fluctuance in the left axilla. Needle aspiration under US guidance was completed 7/25 yeilding 3cc body fluid. Body fluid gram stain with 1+ WBC's (Polymorphonuclear), no orgs. Axilla feeling better after aspiration. The patient received a CT PE where she was found to have a PE and was placed of a Hep gtt. - F/U AM labs, Monitor WBC and H/H since starting Eliquis   - Follow gram stain and culture growing staphylococcus lugdunensis . Will insure sensitivity to Keflex  - Downgraded to keflex, for cellulitis. UA was negative on admission.  - Verzenio is discontinued, discussed with med onc. Will resume as outpatient  - Management of PE per primary team, transitioned to Eliquis yesterday   - Diet: continue regular diet   - Medical management per primary   - Dispo: per primary.  OK to DC from surgery standpoint      JOSE Villatoro CNP 7/27/2022 6:57 AM   Available via [email or web address] [phone]

## 2022-07-27 NOTE — PLAN OF CARE
Problem: Safety - Adult  Goal: Free from fall injury  7/27/2022 0419 by Arian Montoya RN  Outcome: Progressing Towards Goal  Flowsheets (Taken 7/27/2022 0419)  Free From Fall Injury: Based on caregiver fall risk screen, instruct family/caregiver to ask for assistance with transferring infant if caregiver noted to have fall risk factors  Note: Maria Esther Rooney Fall Risk Assessment completed this shift, pt scored as a high fall risk. Pt in bed, bed alarm on, bed wheels locked, bed in lowest position, 2/4 side rails raised. Call light, bedside table, and pt's personal belongings within reach. Pt educated on the need to use the call light and to wait for staff to arrive at bedside prior to attempting to get out of bed. Problem: Skin/Tissue Integrity - Adult  Goal: Incisions, wounds, or drain sites healing without S/S of infection  Outcome: Progressing Towards Goal  Flowsheets (Taken 7/27/2022 0419)  Incisions, Wounds, or Drain Sites Healing Without Sign and Symptoms of Infection: Implement wound care per orders  Note: Pt has wound to L axilla with small amount of serous drainage. Dressing changed overnight. Surrounding skin with blanchable redness.

## 2022-07-27 NOTE — CARE COORDINATION
Case Management Assessment            Discharge Note                    Date / Time of Note: 7/27/2022 10:41 AM                  Discharge Note Completed by: Prudence Lennon RN    Patient Name: Mirlande Duque   YOB: 1945  Diagnosis: Abscess of axilla, left [L02.412]  Abscess [L02.91]   Date / Time: 7/25/2022 12:25 AM    Current PCP: Chepe Hurtado,   Clinic patient: No    Hospitalization in the last 30 days: Yes    Advance Directives:  Code Status: Full Code  [place] DNR form completed and on chart: No    Financial:  Payor: Khurram Kirkland / Plan: [address] PPO / Product Type: Medicare /      Pharmacy:    [address] [facility]  Phone: [phone] Fax: [phone]    [facility] [address] [facility]  [address] [facility] [address]  Phone: [phone] Fax: [address], [address] [phone] - F [phone]  [facility] [address]  Phone: [phone] Fax: [phone]      Assistance purchasing medications?: Potential Assistance Purchasing Medications: No  Assistance provided by Case Management: None at this time    Does patient want to participate in local refill/ meds to beds program?: Yes    Meds To Beds General Rules:  1. Can ONLY be done Monday- Friday between 8:30am-5pm  2. Prescription(s) must be in pharmacy by 3pm to be filled same day  3. Copy of patient's insurance/ prescription drug card and patient face sheet must be sent along with the prescription(s)  4. Cost of Rx cannot be added to hospital bill. If financial assistance is needed, please contact unit  or ;  or  CANNOT provide pharmacy voucher for patients co-pays  5.  Patients can then  the prescription on their way out of the hospital at discharge, or pharmacy can deliver to the bedside if staff is available. (payment due at time of pick-up or delivery - cash, check, or card accepted)     Able to afford home medications/ co-pay costs: Yes    ADLS:  Current PT AM-PAC Score:   /24  Current OT AM-PAC Score:   /24      DISCHARGE Disposition: home with Harish Aparicio through [employer] [facility]    LOC at discharge: Not Applicable  DOUG Completed: Not Indicated    Notification completed in HENS/PAS?:  Not Applicable    IMM Completed:   Not Indicated    Transportation:  Transportation PLAN for discharge: family   Mode of Transport: Private Car  Reason for medical transport: Not Applicable  Name of [address] [place] [address],[address]: Not Applicable      Transport form completed: Not Indicated    Home Care:  [address] ordered at discharge: yes  [address]: [facility]  Orders faxed: Yes    Durable Medical Equipment:  DME Provider: n/a  Equipment obtained during hospitalization: none    Home Oxygen and Respiratory Equipment:  Oxygen needed at discharge?: Not [address]: Not Applicable  Portable tank available for discharge?: Not Indicated    Dialysis:  Dialysis patient: No    Dialysis Center:  Not Applicable    Hospice Services:  Location: Not Applicable  Agency: Not Applicable    Consents signed: Not Indicated    Referrals made at [facility] for outpatient continued care:  Not Applicable    Additional CM Notes: Pt to discharge home with [address] The Plan for Transition of Care is related to the following treatment goals of Abscess of axilla, left [L02.412]  Abscess [L02.91]    The Patient and/or patient representative Nader Ledbetter and her family were provided with a choice of provider and agrees with the discharge plan Yes    Freedom of choice list was provided with basic dialogue that supports the patient's individualized plan of care/goals and shares the quality data associated with the providers. Yes    Care Transitions patient: No    Jean Amos RN  [facility]  Case Management Department  Ph: [phone]  Fax: [phone]

## 2022-07-27 NOTE — FLOWSHEET NOTE
07/27/22 1519   Encounter Summary   Encounter Overview/Reason  Attempted Encounter  (victor m attempted communion- asleep)   Service Provided For: Patient not available   Last Encounter    (7/26 pt asleep/ victor m) Tell him that the colon polyps that were removed were not cancerous but were precancerous. I recommend a repeat c/s in 3 yrs. [place]

## 2022-07-27 NOTE — CARE COORDINATION
CTN contacted Nicky Gonzalez with [email or web address] [phone]. They have accepted this patient, [email or web address] will pull orders from epic.  They will contact patient and make arrangements for [facility] by 7/29  Electronically signed by Maria Isabel Sexton LPN on 2/71/8655 at 79:91 AM

## 2022-07-27 NOTE — PROGRESS NOTES
Pt's daughter Gema Dsouza called and updated on pt's current status and plan of care. All questions answered.  Electronically signed by Jamir Hawk RN on 7/27/2022 at 8:02 AM

## 2022-07-27 NOTE — PLAN OF CARE
Problem: Discharge Planning  Goal: Discharge to home or other facility with appropriate resources  Outcome: Completed     Problem: Safety - Adult  Goal: Free from fall injury  7/27/2022 1420 by Yesy Swan RN  Outcome: Completed  7/27/2022 0419 by Estela Mars RN  Outcome: Progressing  Flowsheets (Taken 7/27/2022 0419)  Free From Fall Injury: Based on caregiver fall risk screen, instruct family/caregiver to ask for assistance with transferring infant if caregiver noted to have fall risk factors  Note: Efrain Rodriguez Fall Risk Assessment completed this shift, pt scored as a high fall risk. Pt in bed, bed alarm on, bed wheels locked, bed in lowest position, 2/4 side rails raised. Call light, bedside table, and pt's personal belongings within reach. Pt educated on the need to use the call light and to wait for staff to arrive at bedside prior to attempting to get out of bed. Problem: ABCDS Injury Assessment  Goal: Absence of physical injury  Outcome: Completed     Problem: Skin/Tissue Integrity - Adult  Goal: Skin integrity remains intact  Outcome: Completed  Goal: Incisions, wounds, or drain sites healing without S/S of infection  7/27/2022 1420 by Yesy Swan RN  Outcome: Completed  7/27/2022 0419 by Estela Mars RN  Outcome: Progressing  Flowsheets (Taken 7/27/2022 0419)  Incisions, Wounds, or Drain Sites Healing Without Sign and Symptoms of Infection: Implement wound care per orders  Note: Pt has wound to L axilla with small amount of serous drainage. Dressing changed overnight. Surrounding skin with blanchable redness.       Problem: Musculoskeletal - Adult  Goal: Return mobility to safest level of function  Outcome: Completed  Goal: Maintain proper alignment of affected body part  Outcome: Completed  Goal: Return ADL status to a safe level of function  Outcome: Completed     Problem: Infection - Adult  Goal: Absence of infection at discharge  Outcome: Completed  Goal: Absence of fever/infection during anticipated neutropenic period  Outcome: Completed     Problem: Hematologic - Adult  Goal: Maintains hematologic stability  Outcome: Completed

## 2022-07-28 LAB
BODY FLUID CULTURE, STERILE: ABNORMAL
GRAM STAIN RESULT: ABNORMAL
ORGANISM: ABNORMAL

## 2022-07-29 LAB
BLOOD CULTURE, ROUTINE: NORMAL
CULTURE, BLOOD 2: NORMAL

## 2023-08-13 ENCOUNTER — APPOINTMENT (OUTPATIENT)
Dept: GENERAL RADIOLOGY | Age: 78
DRG: 195 | End: 2023-08-13
Payer: MEDICARE

## 2023-08-13 ENCOUNTER — APPOINTMENT (OUTPATIENT)
Dept: CT IMAGING | Age: 78
DRG: 195 | End: 2023-08-13
Payer: MEDICARE

## 2023-08-13 ENCOUNTER — HOSPITAL ENCOUNTER (INPATIENT)
Age: 78
LOS: 2 days | Discharge: HOME | DRG: 195 | End: 2023-08-15
Attending: EMERGENCY MEDICINE | Admitting: INTERNAL MEDICINE
Payer: MEDICARE

## 2023-08-13 DIAGNOSIS — E83.42 HYPOMAGNESEMIA: ICD-10-CM

## 2023-08-13 DIAGNOSIS — J18.9 MULTIFOCAL PNEUMONIA: ICD-10-CM

## 2023-08-13 DIAGNOSIS — E87.6 HYPOKALEMIA: ICD-10-CM

## 2023-08-13 DIAGNOSIS — J96.01 ACUTE RESPIRATORY FAILURE WITH HYPOXIA (HCC): Primary | ICD-10-CM

## 2023-08-13 PROBLEM — I10 PRIMARY HYPERTENSION: Status: ACTIVE | Noted: 2023-08-13

## 2023-08-13 PROBLEM — F41.9 ANXIETY: Status: ACTIVE | Noted: 2023-08-13

## 2023-08-13 PROBLEM — Z86.711 HISTORY OF PULMONARY EMBOLISM: Status: ACTIVE | Noted: 2023-08-13

## 2023-08-13 PROBLEM — K21.9 GERD (GASTROESOPHAGEAL REFLUX DISEASE): Status: ACTIVE | Noted: 2023-08-13

## 2023-08-13 PROBLEM — R09.02 HYPOXIA: Status: ACTIVE | Noted: 2023-08-13

## 2023-08-13 PROBLEM — Z85.3 HISTORY OF BREAST CANCER: Status: ACTIVE | Noted: 2023-08-13

## 2023-08-13 LAB
ANION GAP SERPL CALCULATED.3IONS-SCNC: 11 MMOL/L (ref 3–16)
BASE EXCESS BLDV CALC-SCNC: 4.4 MMOL/L (ref -3–3)
BASOPHILS # BLD: 0 K/UL (ref 0–0.2)
BASOPHILS NFR BLD: 0.8 %
BUN SERPL-MCNC: 22 MG/DL (ref 7–20)
CALCIUM SERPL-MCNC: 8.8 MG/DL (ref 8.3–10.6)
CHLORIDE SERPL-SCNC: 96 MMOL/L (ref 99–110)
CO2 BLDV-SCNC: 33 MMOL/L
CO2 SERPL-SCNC: 30 MMOL/L (ref 21–32)
COHGB MFR BLDV: 1.9 % (ref 0–1.5)
CREAT SERPL-MCNC: 0.8 MG/DL (ref 0.6–1.2)
DEPRECATED RDW RBC AUTO: 14.3 % (ref 12.4–15.4)
EKG ATRIAL RATE: 78 BPM
EKG DIAGNOSIS: NORMAL
EKG P AXIS: 79 DEGREES
EKG P-R INTERVAL: 142 MS
EKG Q-T INTERVAL: 414 MS
EKG QRS DURATION: 100 MS
EKG QTC CALCULATION (BAZETT): 471 MS
EKG R AXIS: 72 DEGREES
EKG T AXIS: 46 DEGREES
EKG VENTRICULAR RATE: 78 BPM
EOSINOPHIL # BLD: 0 K/UL (ref 0–0.6)
EOSINOPHIL NFR BLD: 0.4 %
GFR SERPLBLD CREATININE-BSD FMLA CKD-EPI: >60 ML/MIN/{1.73_M2}
GLUCOSE SERPL-MCNC: 116 MG/DL (ref 70–99)
HCO3 BLDV-SCNC: 30.8 MMOL/L (ref 23–29)
HCT VFR BLD AUTO: 35.1 % (ref 36–48)
HGB BLD-MCNC: 12.2 G/DL (ref 12–16)
LACTATE BLDV-SCNC: 1.4 MMOL/L (ref 0.4–2)
LYMPHOCYTES # BLD: 0.5 K/UL (ref 1–5.1)
LYMPHOCYTES NFR BLD: 11.4 %
MAGNESIUM SERPL-MCNC: 1.3 MG/DL (ref 1.8–2.4)
MCH RBC QN AUTO: 32.4 PG (ref 26–34)
MCHC RBC AUTO-ENTMCNC: 34.7 G/DL (ref 31–36)
MCV RBC AUTO: 93.6 FL (ref 80–100)
METHGB MFR BLDV: 0.3 %
MONOCYTES # BLD: 0 K/UL (ref 0–1.3)
MONOCYTES NFR BLD: 0.8 %
NEUTROPHILS # BLD: 3.6 K/UL (ref 1.7–7.7)
NEUTROPHILS NFR BLD: 86.6 %
NT-PROBNP SERPL-MCNC: 171 PG/ML (ref 0–449)
O2 CT VFR BLDV CALC: 8 VOL %
O2 THERAPY: ABNORMAL
PCO2 BLDV: 54 MMHG (ref 40–50)
PH BLDV: 7.37 [PH] (ref 7.35–7.45)
PLATELET # BLD AUTO: 258 K/UL (ref 135–450)
PMV BLD AUTO: 7 FL (ref 5–10.5)
PO2 BLDV: 27.3 MMHG (ref 25–40)
POTASSIUM SERPL-SCNC: 3.3 MMOL/L (ref 3.5–5.1)
PROCALCITONIN SERPL IA-MCNC: 0.55 NG/ML (ref 0–0.15)
RBC # BLD AUTO: 3.75 M/UL (ref 4–5.2)
SAO2 % BLDV: 48 %
SARS-COV-2 RDRP RESP QL NAA+PROBE: NOT DETECTED
SODIUM SERPL-SCNC: 137 MMOL/L (ref 136–145)
TROPONIN, HIGH SENSITIVITY: 11 NG/L (ref 0–14)
TROPONIN, HIGH SENSITIVITY: 9 NG/L (ref 0–14)
WBC # BLD AUTO: 4.2 K/UL (ref 4–11)

## 2023-08-13 PROCEDURE — 6370000000 HC RX 637 (ALT 250 FOR IP): Performed by: INTERNAL MEDICINE

## 2023-08-13 PROCEDURE — 71260 CT THORAX DX C+: CPT

## 2023-08-13 PROCEDURE — 71046 X-RAY EXAM CHEST 2 VIEWS: CPT

## 2023-08-13 PROCEDURE — 82803 BLOOD GASES ANY COMBINATION: CPT

## 2023-08-13 PROCEDURE — 87635 SARS-COV-2 COVID-19 AMP PRB: CPT

## 2023-08-13 PROCEDURE — 83880 ASSAY OF NATRIURETIC PEPTIDE: CPT

## 2023-08-13 PROCEDURE — 6360000002 HC RX W HCPCS: Performed by: INTERNAL MEDICINE

## 2023-08-13 PROCEDURE — 2580000003 HC RX 258: Performed by: INTERNAL MEDICINE

## 2023-08-13 PROCEDURE — 94761 N-INVAS EAR/PLS OXIMETRY MLT: CPT

## 2023-08-13 PROCEDURE — 99285 EMERGENCY DEPT VISIT HI MDM: CPT

## 2023-08-13 PROCEDURE — 97161 PT EVAL LOW COMPLEX 20 MIN: CPT

## 2023-08-13 PROCEDURE — 1200000000 HC SEMI PRIVATE

## 2023-08-13 PROCEDURE — 96367 TX/PROPH/DG ADDL SEQ IV INF: CPT

## 2023-08-13 PROCEDURE — 6360000004 HC RX CONTRAST MEDICATION: Performed by: EMERGENCY MEDICINE

## 2023-08-13 PROCEDURE — 99223 1ST HOSP IP/OBS HIGH 75: CPT | Performed by: INTERNAL MEDICINE

## 2023-08-13 PROCEDURE — 87040 BLOOD CULTURE FOR BACTERIA: CPT

## 2023-08-13 PROCEDURE — 80048 BASIC METABOLIC PNL TOTAL CA: CPT

## 2023-08-13 PROCEDURE — 96375 TX/PRO/DX INJ NEW DRUG ADDON: CPT

## 2023-08-13 PROCEDURE — 84145 PROCALCITONIN (PCT): CPT

## 2023-08-13 PROCEDURE — 2700000000 HC OXYGEN THERAPY PER DAY

## 2023-08-13 PROCEDURE — 96365 THER/PROPH/DIAG IV INF INIT: CPT

## 2023-08-13 PROCEDURE — 6360000002 HC RX W HCPCS: Performed by: EMERGENCY MEDICINE

## 2023-08-13 PROCEDURE — 83735 ASSAY OF MAGNESIUM: CPT

## 2023-08-13 PROCEDURE — 85025 COMPLETE CBC W/AUTO DIFF WBC: CPT

## 2023-08-13 PROCEDURE — 6370000000 HC RX 637 (ALT 250 FOR IP): Performed by: EMERGENCY MEDICINE

## 2023-08-13 PROCEDURE — 97530 THERAPEUTIC ACTIVITIES: CPT

## 2023-08-13 PROCEDURE — 2580000003 HC RX 258: Performed by: EMERGENCY MEDICINE

## 2023-08-13 PROCEDURE — 87449 NOS EACH ORGANISM AG IA: CPT

## 2023-08-13 PROCEDURE — 36415 COLL VENOUS BLD VENIPUNCTURE: CPT

## 2023-08-13 PROCEDURE — 84484 ASSAY OF TROPONIN QUANT: CPT

## 2023-08-13 PROCEDURE — 83605 ASSAY OF LACTIC ACID: CPT

## 2023-08-13 RX ORDER — ACETAMINOPHEN 325 MG/1
650 TABLET ORAL EVERY 6 HOURS PRN
Status: DISCONTINUED | OUTPATIENT
Start: 2023-08-13 | End: 2023-08-15 | Stop reason: HOSPADM

## 2023-08-13 RX ORDER — ENOXAPARIN SODIUM 100 MG/ML
40 INJECTION SUBCUTANEOUS DAILY
Status: CANCELLED | OUTPATIENT
Start: 2023-08-13

## 2023-08-13 RX ORDER — SODIUM CHLORIDE 9 MG/ML
INJECTION, SOLUTION INTRAVENOUS PRN
Status: DISCONTINUED | OUTPATIENT
Start: 2023-08-13 | End: 2023-08-15 | Stop reason: HOSPADM

## 2023-08-13 RX ORDER — ACETAMINOPHEN 650 MG/1
650 SUPPOSITORY RECTAL EVERY 6 HOURS PRN
Status: DISCONTINUED | OUTPATIENT
Start: 2023-08-13 | End: 2023-08-15 | Stop reason: HOSPADM

## 2023-08-13 RX ORDER — SODIUM CHLORIDE 0.9 % (FLUSH) 0.9 %
5-40 SYRINGE (ML) INJECTION EVERY 12 HOURS SCHEDULED
Status: DISCONTINUED | OUTPATIENT
Start: 2023-08-13 | End: 2023-08-15 | Stop reason: HOSPADM

## 2023-08-13 RX ORDER — ATORVASTATIN CALCIUM 10 MG/1
20 TABLET, FILM COATED ORAL DAILY
Status: DISCONTINUED | OUTPATIENT
Start: 2023-08-13 | End: 2023-08-15 | Stop reason: HOSPADM

## 2023-08-13 RX ORDER — ALOSETRON HYDROCHLORIDE 0.5 MG/1
0.5 TABLET, FILM COATED ORAL 2 TIMES DAILY
Status: DISCONTINUED | OUTPATIENT
Start: 2023-08-13 | End: 2023-08-15 | Stop reason: HOSPADM

## 2023-08-13 RX ORDER — SODIUM CHLORIDE 0.9 % (FLUSH) 0.9 %
5-40 SYRINGE (ML) INJECTION PRN
Status: DISCONTINUED | OUTPATIENT
Start: 2023-08-13 | End: 2023-08-15 | Stop reason: HOSPADM

## 2023-08-13 RX ORDER — POLYETHYLENE GLYCOL 3350 17 G/17G
17 POWDER, FOR SOLUTION ORAL DAILY PRN
Status: DISCONTINUED | OUTPATIENT
Start: 2023-08-13 | End: 2023-08-15 | Stop reason: HOSPADM

## 2023-08-13 RX ORDER — KETOROLAC TROMETHAMINE 30 MG/ML
15 INJECTION, SOLUTION INTRAMUSCULAR; INTRAVENOUS ONCE
Status: COMPLETED | OUTPATIENT
Start: 2023-08-13 | End: 2023-08-13

## 2023-08-13 RX ORDER — MAGNESIUM SULFATE IN WATER 40 MG/ML
2000 INJECTION, SOLUTION INTRAVENOUS ONCE
Status: COMPLETED | OUTPATIENT
Start: 2023-08-13 | End: 2023-08-13

## 2023-08-13 RX ORDER — ALPRAZOLAM 1 MG/1
1 TABLET ORAL NIGHTLY
Status: DISCONTINUED | OUTPATIENT
Start: 2023-08-13 | End: 2023-08-15 | Stop reason: HOSPADM

## 2023-08-13 RX ORDER — PREDNISONE 20 MG/1
40 TABLET ORAL DAILY
Status: DISCONTINUED | OUTPATIENT
Start: 2023-08-13 | End: 2023-08-15 | Stop reason: HOSPADM

## 2023-08-13 RX ORDER — 0.9 % SODIUM CHLORIDE 0.9 %
30 INTRAVENOUS SOLUTION INTRAVENOUS ONCE
Status: COMPLETED | OUTPATIENT
Start: 2023-08-13 | End: 2023-08-13

## 2023-08-13 RX ORDER — ONDANSETRON 4 MG/1
4 TABLET, ORALLY DISINTEGRATING ORAL EVERY 8 HOURS PRN
Status: DISCONTINUED | OUTPATIENT
Start: 2023-08-13 | End: 2023-08-15 | Stop reason: HOSPADM

## 2023-08-13 RX ORDER — POTASSIUM CHLORIDE 20 MEQ/1
40 TABLET, EXTENDED RELEASE ORAL ONCE
Status: COMPLETED | OUTPATIENT
Start: 2023-08-13 | End: 2023-08-13

## 2023-08-13 RX ORDER — ONDANSETRON 2 MG/ML
4 INJECTION INTRAMUSCULAR; INTRAVENOUS EVERY 6 HOURS PRN
Status: DISCONTINUED | OUTPATIENT
Start: 2023-08-13 | End: 2023-08-15 | Stop reason: HOSPADM

## 2023-08-13 RX ORDER — HYDROCODONE BITARTRATE AND ACETAMINOPHEN 5; 325 MG/1; MG/1
1 TABLET ORAL EVERY 6 HOURS PRN
Status: DISCONTINUED | OUTPATIENT
Start: 2023-08-13 | End: 2023-08-15 | Stop reason: HOSPADM

## 2023-08-13 RX ORDER — MAGNESIUM SULFATE 1 G/100ML
1000 INJECTION INTRAVENOUS PRN
Status: DISCONTINUED | OUTPATIENT
Start: 2023-08-13 | End: 2023-08-15 | Stop reason: HOSPADM

## 2023-08-13 RX ORDER — CELECOXIB 200 MG/1
200 CAPSULE ORAL DAILY
Status: DISCONTINUED | OUTPATIENT
Start: 2023-08-13 | End: 2023-08-15 | Stop reason: HOSPADM

## 2023-08-13 RX ORDER — AMLODIPINE BESYLATE 5 MG/1
1 TABLET ORAL DAILY
COMMUNITY
Start: 2023-05-15

## 2023-08-13 RX ADMIN — CELECOXIB 200 MG: 200 CAPSULE ORAL at 20:56

## 2023-08-13 RX ADMIN — PREDNISONE 40 MG: 20 TABLET ORAL at 15:44

## 2023-08-13 RX ADMIN — APIXABAN 5 MG: 5 TABLET, FILM COATED ORAL at 19:58

## 2023-08-13 RX ADMIN — PIPERACILLIN AND TAZOBACTAM 3375 MG: 3; .375 INJECTION, POWDER, LYOPHILIZED, FOR SOLUTION INTRAVENOUS at 20:56

## 2023-08-13 RX ADMIN — ATORVASTATIN CALCIUM 20 MG: 10 TABLET, FILM COATED ORAL at 12:35

## 2023-08-13 RX ADMIN — POTASSIUM CHLORIDE 40 MEQ: 1500 TABLET, EXTENDED RELEASE ORAL at 09:19

## 2023-08-13 RX ADMIN — ACETAMINOPHEN 650 MG: 325 TABLET ORAL at 20:58

## 2023-08-13 RX ADMIN — Medication 10 ML: at 12:35

## 2023-08-13 RX ADMIN — SODIUM CHLORIDE 25 ML: 9 INJECTION, SOLUTION INTRAVENOUS at 14:20

## 2023-08-13 RX ADMIN — HYDROCODONE BITARTRATE AND ACETAMINOPHEN 1 TABLET: 5; 325 TABLET ORAL at 14:34

## 2023-08-13 RX ADMIN — AZITHROMYCIN MONOHYDRATE 500 MG: 500 INJECTION, POWDER, LYOPHILIZED, FOR SOLUTION INTRAVENOUS at 09:18

## 2023-08-13 RX ADMIN — MAGNESIUM SULFATE HEPTAHYDRATE 2000 MG: 40 INJECTION, SOLUTION INTRAVENOUS at 10:50

## 2023-08-13 RX ADMIN — SODIUM CHLORIDE 2232 ML: 9 INJECTION, SOLUTION INTRAVENOUS at 08:38

## 2023-08-13 RX ADMIN — CEFTRIAXONE SODIUM 1000 MG: 1 INJECTION, POWDER, FOR SOLUTION INTRAMUSCULAR; INTRAVENOUS at 08:39

## 2023-08-13 RX ADMIN — KETOROLAC TROMETHAMINE 15 MG: 30 INJECTION, SOLUTION INTRAMUSCULAR; INTRAVENOUS at 08:36

## 2023-08-13 RX ADMIN — IOPAMIDOL 75 ML: 755 INJECTION, SOLUTION INTRAVENOUS at 08:31

## 2023-08-13 RX ADMIN — Medication 10 ML: at 20:54

## 2023-08-13 RX ADMIN — PIPERACILLIN AND TAZOBACTAM 3375 MG: 3; .375 INJECTION, POWDER, LYOPHILIZED, FOR SOLUTION INTRAVENOUS at 14:22

## 2023-08-13 RX ADMIN — ONDANSETRON 4 MG: 4 TABLET, ORALLY DISINTEGRATING ORAL at 10:00

## 2023-08-13 RX ADMIN — APIXABAN 5 MG: 5 TABLET, FILM COATED ORAL at 12:35

## 2023-08-13 RX ADMIN — ALPRAZOLAM 1 MG: 1 TABLET ORAL at 20:58

## 2023-08-13 ASSESSMENT — ENCOUNTER SYMPTOMS
COLOR CHANGE: 0
VOMITING: 0
NAUSEA: 0
FACIAL SWELLING: 0
TROUBLE SWALLOWING: 0
ABDOMINAL PAIN: 0
COUGH: 1
VOICE CHANGE: 0
SHORTNESS OF BREATH: 1

## 2023-08-13 ASSESSMENT — LIFESTYLE VARIABLES
HOW OFTEN DO YOU HAVE A DRINK CONTAINING ALCOHOL: MONTHLY OR LESS
HOW MANY STANDARD DRINKS CONTAINING ALCOHOL DO YOU HAVE ON A TYPICAL DAY: 1 OR 2

## 2023-08-13 ASSESSMENT — PAIN DESCRIPTION - ORIENTATION: ORIENTATION: RIGHT

## 2023-08-13 ASSESSMENT — PAIN DESCRIPTION - LOCATION
LOCATION: OTHER (COMMENT)
LOCATION: ABDOMEN
LOCATION: BACK

## 2023-08-13 ASSESSMENT — PAIN DESCRIPTION - DESCRIPTORS: DESCRIPTORS: SHARP

## 2023-08-13 ASSESSMENT — PAIN SCALES - GENERAL
PAINLEVEL_OUTOF10: 3
PAINLEVEL_OUTOF10: 5
PAINLEVEL_OUTOF10: 7
PAINLEVEL_OUTOF10: 9

## 2023-08-13 NOTE — ED PROVIDER NOTES
[address] [address] ED  eMERGENCY dEPARTMENT eNCOUnter      Pt Name: Gene Barnes  MRN: [id number]  [address] [facility] [place] [birth year]  Date of evaluation: 8/13/2023  Provider: Rebeca Walters MD    [address]       Chief Complaint   Patient presents with    Cough     Cough started since 0100 causing pain in her right side;     HISTORY OF PRESENT ILLNESS   (Location/Symptom, Timing/Onset, Context/Setting, Quality, Duration, Modifying Factors, Severity)  Note limiting factors. History obtained from: the patient    Gene Barnes is a 68 y.o. female who has both previous history of pneumonia and pulmonary embolism who is anticoagulated with Eliquis and reports has been taking her medication as prescribed and does not have an oxygen requirement at baseline he reports she felt in her normal state of health when she went to bed last night however woke up at 1:00 in the morning with a nonproductive cough and pain in the right side of her chest.  Patient ports coughing or taking a deep breath worsens her pain. Patient has any leg swelling. Patient denies any hemoptysis. Patient does report significant shortness of breath. HPI    Nursing Notes were reviewed. REVIEW OFSYSTEMS    (2-9 systems for level 4, 10 or more for level 5)     Review of Systems   Constitutional:  Negative for appetite change, fever and unexpected weight change. HENT:  Negative for facial swelling, trouble swallowing and voice change. Eyes:  Negative for visual disturbance. Respiratory:  Positive for cough and shortness of breath. Cardiovascular:  Positive for chest pain. Negative for palpitations. Gastrointestinal:  Negative for abdominal pain, nausea and vomiting. Genitourinary:  Negative for dysuria and vaginal bleeding. Musculoskeletal:  Negative for neck pain and neck stiffness. Skin:  Negative for color change and wound. Neurological:  Negative for seizures and syncope.    Psychiatric/Behavioral:  Negative

## 2023-08-13 NOTE — H&P
History of breast cancer  Resolved Problems:    * No resolved hospital problems. *      PLAN:    #Community-acquired pneumonia from gram-positive organism. Woke up with right-sided chest pain and cough. Chest CT showed right-sided pneumonia. It is extensive. No PE. Admitted to the hospital.  Pneumonia protocol was initiated. On IV Rocephin and Zithromax. #Hypoxia. Requiring oxygen. Acute respiratory failure ruled out. Try to wean her off oxygen. #Primary hypertension. Continue with amlodipine 5 mg a day and hydrochlorothiazide. #Anxiety. On Lexapro and Xanax. #Electrolyte abnormalities. Replace as needed. #History of pulmonary embolism. On Eliquis. We will continue. On Eliquis for DVT prophylaxis. Note pneumonia and hypoxia makes her higher risk for morbidity and mortality requiring testing and treatment.      Personally reviewed Lab Studies and Imaging     EKG interpreted personally and results NSR with RBBB    Imaging that was interpreted personally includes CXR and results RLL pneumonia      Evelyn Mondragon MD 8/13/2023 12:31 PM

## 2023-08-13 NOTE — ED NOTES
Writer gave report to Amadou Sheikh, who assumes care when pt is transferred to [facility] [address] RN will transport pt via wheelchair.        Caroline Gonzalez RN  08/13/23 1356

## 2023-08-14 PROBLEM — J96.01 ACUTE RESPIRATORY FAILURE WITH HYPOXIA (HCC): Status: ACTIVE | Noted: 2023-08-14

## 2023-08-14 LAB
ANION GAP SERPL CALCULATED.3IONS-SCNC: 9 MMOL/L (ref 3–16)
BASOPHILS # BLD: 0 K/UL (ref 0–0.2)
BASOPHILS NFR BLD: 0.2 %
BUN SERPL-MCNC: 18 MG/DL (ref 7–20)
CALCIUM SERPL-MCNC: 8.1 MG/DL (ref 8.3–10.6)
CHLORIDE SERPL-SCNC: 101 MMOL/L (ref 99–110)
CO2 SERPL-SCNC: 26 MMOL/L (ref 21–32)
CREAT SERPL-MCNC: 0.6 MG/DL (ref 0.6–1.2)
DEPRECATED RDW RBC AUTO: 14.2 % (ref 12.4–15.4)
EOSINOPHIL # BLD: 0 K/UL (ref 0–0.6)
EOSINOPHIL NFR BLD: 0.1 %
GFR SERPLBLD CREATININE-BSD FMLA CKD-EPI: >60 ML/MIN/{1.73_M2}
GLUCOSE SERPL-MCNC: 131 MG/DL (ref 70–99)
HCT VFR BLD AUTO: 31.5 % (ref 36–48)
HGB BLD-MCNC: 10.5 G/DL (ref 12–16)
LEGIONELLA AG UR QL: NORMAL
LYMPHOCYTES # BLD: 0.8 K/UL (ref 1–5.1)
LYMPHOCYTES NFR BLD: 5.9 %
MCH RBC QN AUTO: 32 PG (ref 26–34)
MCHC RBC AUTO-ENTMCNC: 33.5 G/DL (ref 31–36)
MCV RBC AUTO: 95.7 FL (ref 80–100)
MONOCYTES # BLD: 0.4 K/UL (ref 0–1.3)
MONOCYTES NFR BLD: 2.8 %
NEUTROPHILS # BLD: 12.5 K/UL (ref 1.7–7.7)
NEUTROPHILS NFR BLD: 91 %
PLATELET # BLD AUTO: 205 K/UL (ref 135–450)
PMV BLD AUTO: 7.4 FL (ref 5–10.5)
POTASSIUM SERPL-SCNC: 3.8 MMOL/L (ref 3.5–5.1)
RBC # BLD AUTO: 3.29 M/UL (ref 4–5.2)
S PNEUM AG UR QL: NORMAL
SODIUM SERPL-SCNC: 136 MMOL/L (ref 136–145)
WBC # BLD AUTO: 13.7 K/UL (ref 4–11)

## 2023-08-14 PROCEDURE — 6370000000 HC RX 637 (ALT 250 FOR IP): Performed by: INTERNAL MEDICINE

## 2023-08-14 PROCEDURE — 1200000000 HC SEMI PRIVATE

## 2023-08-14 PROCEDURE — 80048 BASIC METABOLIC PNL TOTAL CA: CPT

## 2023-08-14 PROCEDURE — 97535 SELF CARE MNGMENT TRAINING: CPT

## 2023-08-14 PROCEDURE — 97530 THERAPEUTIC ACTIVITIES: CPT

## 2023-08-14 PROCEDURE — 2580000003 HC RX 258: Performed by: INTERNAL MEDICINE

## 2023-08-14 PROCEDURE — 2700000000 HC OXYGEN THERAPY PER DAY

## 2023-08-14 PROCEDURE — 99233 SBSQ HOSP IP/OBS HIGH 50: CPT | Performed by: INTERNAL MEDICINE

## 2023-08-14 PROCEDURE — 85025 COMPLETE CBC W/AUTO DIFF WBC: CPT

## 2023-08-14 PROCEDURE — 6360000002 HC RX W HCPCS: Performed by: INTERNAL MEDICINE

## 2023-08-14 PROCEDURE — 94761 N-INVAS EAR/PLS OXIMETRY MLT: CPT

## 2023-08-14 PROCEDURE — 36415 COLL VENOUS BLD VENIPUNCTURE: CPT

## 2023-08-14 PROCEDURE — 97110 THERAPEUTIC EXERCISES: CPT

## 2023-08-14 PROCEDURE — 97116 GAIT TRAINING THERAPY: CPT

## 2023-08-14 PROCEDURE — 99232 SBSQ HOSP IP/OBS MODERATE 35: CPT

## 2023-08-14 PROCEDURE — 97165 OT EVAL LOW COMPLEX 30 MIN: CPT

## 2023-08-14 RX ADMIN — Medication 10 ML: at 20:38

## 2023-08-14 RX ADMIN — ONDANSETRON 4 MG: 4 TABLET, ORALLY DISINTEGRATING ORAL at 09:57

## 2023-08-14 RX ADMIN — HYDROCODONE BITARTRATE AND ACETAMINOPHEN 1 TABLET: 5; 325 TABLET ORAL at 19:28

## 2023-08-14 RX ADMIN — ALOSETRON HYDROCHLORIDE 0.5 MG: 0.5 TABLET, FILM COATED ORAL at 08:10

## 2023-08-14 RX ADMIN — ATORVASTATIN CALCIUM 20 MG: 10 TABLET, FILM COATED ORAL at 08:04

## 2023-08-14 RX ADMIN — APIXABAN 5 MG: 5 TABLET, FILM COATED ORAL at 08:30

## 2023-08-14 RX ADMIN — CELECOXIB 200 MG: 200 CAPSULE ORAL at 20:37

## 2023-08-14 RX ADMIN — ALPRAZOLAM 1 MG: 1 TABLET ORAL at 20:37

## 2023-08-14 RX ADMIN — Medication 10 ML: at 08:04

## 2023-08-14 RX ADMIN — PIPERACILLIN AND TAZOBACTAM 3375 MG: 3; .375 INJECTION, POWDER, LYOPHILIZED, FOR SOLUTION INTRAVENOUS at 20:41

## 2023-08-14 RX ADMIN — PREDNISONE 40 MG: 20 TABLET ORAL at 08:04

## 2023-08-14 RX ADMIN — APIXABAN 5 MG: 5 TABLET, FILM COATED ORAL at 20:37

## 2023-08-14 RX ADMIN — HYDROCODONE BITARTRATE AND ACETAMINOPHEN 1 TABLET: 5; 325 TABLET ORAL at 03:12

## 2023-08-14 RX ADMIN — PIPERACILLIN AND TAZOBACTAM 3375 MG: 3; .375 INJECTION, POWDER, LYOPHILIZED, FOR SOLUTION INTRAVENOUS at 13:18

## 2023-08-14 RX ADMIN — AZITHROMYCIN MONOHYDRATE 500 MG: 500 INJECTION, POWDER, LYOPHILIZED, FOR SOLUTION INTRAVENOUS at 10:03

## 2023-08-14 RX ADMIN — PIPERACILLIN AND TAZOBACTAM 3375 MG: 3; .375 INJECTION, POWDER, LYOPHILIZED, FOR SOLUTION INTRAVENOUS at 06:02

## 2023-08-14 ASSESSMENT — PAIN DESCRIPTION - DESCRIPTORS
DESCRIPTORS: ACHING;DISCOMFORT
DESCRIPTORS: DISCOMFORT
DESCRIPTORS: STABBING

## 2023-08-14 ASSESSMENT — PAIN SCALES - WONG BAKER
WONGBAKER_NUMERICALRESPONSE: 0
WONGBAKER_NUMERICALRESPONSE: 2

## 2023-08-14 ASSESSMENT — PAIN DESCRIPTION - ONSET
ONSET: GRADUAL
ONSET: ON-GOING

## 2023-08-14 ASSESSMENT — PAIN - FUNCTIONAL ASSESSMENT
PAIN_FUNCTIONAL_ASSESSMENT: ACTIVITIES ARE NOT PREVENTED
PAIN_FUNCTIONAL_ASSESSMENT: ACTIVITIES ARE NOT PREVENTED
PAIN_FUNCTIONAL_ASSESSMENT: PREVENTS OR INTERFERES SOME ACTIVE ACTIVITIES AND ADLS

## 2023-08-14 ASSESSMENT — PAIN SCALES - GENERAL
PAINLEVEL_OUTOF10: 0
PAINLEVEL_OUTOF10: 3
PAINLEVEL_OUTOF10: 8
PAINLEVEL_OUTOF10: 5
PAINLEVEL_OUTOF10: 5

## 2023-08-14 ASSESSMENT — PAIN DESCRIPTION - ORIENTATION
ORIENTATION: RIGHT;LEFT
ORIENTATION: LOWER
ORIENTATION: UPPER

## 2023-08-14 ASSESSMENT — PAIN DESCRIPTION - LOCATION
LOCATION: ABDOMEN
LOCATION: ABDOMEN
LOCATION: HIP

## 2023-08-14 ASSESSMENT — PAIN DESCRIPTION - PAIN TYPE
TYPE: ACUTE PAIN
TYPE: ACUTE PAIN

## 2023-08-14 ASSESSMENT — PAIN DESCRIPTION - FREQUENCY
FREQUENCY: INTERMITTENT
FREQUENCY: INTERMITTENT

## 2023-08-14 NOTE — CARE COORDINATION
Case Management Assessment  Initial Evaluation    Date/Time of Evaluation: 8/14/2023 2:47 PM  Assessment Completed by: Jing Singh RN    If patient is discharged prior to next notation, then this note serves as note for discharge by case management. Patient Name: Inez Oropeza                   YOB: 1945  Diagnosis: Hypokalemia [E87.6]  Hypomagnesemia [E83.42]  Acute respiratory failure with hypoxia ([address]) [J96.01]  Pneumonia due to infectious organism [J18.9]  Multifocal pneumonia [J18.9]                   Date / Time: 8/13/2023  6:44 AM    Patient Admission Status: Inpatient   Readmission Risk (Low < 19, Mod (19-27), High > 27): Readmission Risk Score: 12.3    Current PCP: Silas Ormond, DO  PCP verified by CM? (P) Yes    Chart Reviewed: Yes      History Provided by: (P) Patient  Patient Orientation: (P) Alert and Oriented, Person, Place    Patient Cognition: (P) Alert    Hospitalization in the last 30 days (Readmission):  No    If yes, Readmission Assessment in  Navigator will be completed.     Advance Directives:      Code Status: Full Code   Patient's Primary Decision Maker is: (P) Legal Next of Kin      Discharge Planning:    Patient lives with: (P) Alone Type of Home: (P) House  Primary Care Giver: (P) Self  Patient Support Systems include: (P) Children   Current Financial resources: (P) Medicare  Current community resources: (P) None  Current services prior to admission: (P) Durable Medical Equipment            Current DME: (P) Walker (walking stick PRN)            Type of Home Care services:  (P) None    ADLS  Prior functional level: (P) Independent in ADLs/IADLs  Current functional level: (P) Independent in ADLs/IADLs    PT AM-PAC: 20 /24  OT AM-PAC: 20 /24    Family can provide assistance at DC: (P) Yes (as needed)  Would you like Case Management to discuss the discharge plan with any other family members/significant others, and if so, who? (P) No  Plans to Return to Present

## 2023-08-15 ENCOUNTER — APPOINTMENT (OUTPATIENT)
Dept: GENERAL RADIOLOGY | Age: 78
DRG: 195 | End: 2023-08-15
Payer: MEDICARE

## 2023-08-15 ENCOUNTER — TELEPHONE (OUTPATIENT)
Dept: PULMONOLOGY | Age: 78
End: 2023-08-15

## 2023-08-15 VITALS
HEIGHT: 66 IN | HEART RATE: 71 BPM | BODY MASS INDEX: 27.97 KG/M2 | DIASTOLIC BLOOD PRESSURE: 78 MMHG | OXYGEN SATURATION: 92 % | WEIGHT: 174 LBS | TEMPERATURE: 99.6 F | SYSTOLIC BLOOD PRESSURE: 133 MMHG | RESPIRATION RATE: 18 BRPM

## 2023-08-15 DIAGNOSIS — J18.9 COMMUNITY ACQUIRED PNEUMONIA OF RIGHT LOWER LOBE OF LUNG: ICD-10-CM

## 2023-08-15 DIAGNOSIS — J90 PLEURAL EFFUSION: Primary | ICD-10-CM

## 2023-08-15 LAB
ANION GAP SERPL CALCULATED.3IONS-SCNC: 10 MMOL/L (ref 3–16)
BASOPHILS # BLD: 0 K/UL (ref 0–0.2)
BASOPHILS NFR BLD: 0.1 %
BUN SERPL-MCNC: 19 MG/DL (ref 7–20)
CALCIUM SERPL-MCNC: 8.6 MG/DL (ref 8.3–10.6)
CHLORIDE SERPL-SCNC: 102 MMOL/L (ref 99–110)
CO2 SERPL-SCNC: 25 MMOL/L (ref 21–32)
CREAT SERPL-MCNC: 0.6 MG/DL (ref 0.6–1.2)
DEPRECATED RDW RBC AUTO: 14.5 % (ref 12.4–15.4)
EOSINOPHIL # BLD: 0.1 K/UL (ref 0–0.6)
EOSINOPHIL NFR BLD: 0.4 %
GFR SERPLBLD CREATININE-BSD FMLA CKD-EPI: >60 ML/MIN/{1.73_M2}
GLUCOSE SERPL-MCNC: 133 MG/DL (ref 70–99)
HCT VFR BLD AUTO: 31.6 % (ref 36–48)
HGB BLD-MCNC: 10.8 G/DL (ref 12–16)
LYMPHOCYTES # BLD: 1.2 K/UL (ref 1–5.1)
LYMPHOCYTES NFR BLD: 8.6 %
MAGNESIUM SERPL-MCNC: 2.1 MG/DL (ref 1.8–2.4)
MCH RBC QN AUTO: 32.2 PG (ref 26–34)
MCHC RBC AUTO-ENTMCNC: 34.3 G/DL (ref 31–36)
MCV RBC AUTO: 94.1 FL (ref 80–100)
MONOCYTES # BLD: 0.5 K/UL (ref 0–1.3)
MONOCYTES NFR BLD: 3.5 %
NEUTROPHILS # BLD: 12.5 K/UL (ref 1.7–7.7)
NEUTROPHILS NFR BLD: 87.4 %
PLATELET # BLD AUTO: 226 K/UL (ref 135–450)
PMV BLD AUTO: 7.6 FL (ref 5–10.5)
POTASSIUM SERPL-SCNC: 3.5 MMOL/L (ref 3.5–5.1)
RBC # BLD AUTO: 3.36 M/UL (ref 4–5.2)
SODIUM SERPL-SCNC: 137 MMOL/L (ref 136–145)
WBC # BLD AUTO: 14.3 K/UL (ref 4–11)

## 2023-08-15 PROCEDURE — 6370000000 HC RX 637 (ALT 250 FOR IP): Performed by: INTERNAL MEDICINE

## 2023-08-15 PROCEDURE — 80048 BASIC METABOLIC PNL TOTAL CA: CPT

## 2023-08-15 PROCEDURE — 6360000002 HC RX W HCPCS: Performed by: INTERNAL MEDICINE

## 2023-08-15 PROCEDURE — 2580000003 HC RX 258: Performed by: INTERNAL MEDICINE

## 2023-08-15 PROCEDURE — 85025 COMPLETE CBC W/AUTO DIFF WBC: CPT

## 2023-08-15 PROCEDURE — 83735 ASSAY OF MAGNESIUM: CPT

## 2023-08-15 PROCEDURE — 99232 SBSQ HOSP IP/OBS MODERATE 35: CPT | Performed by: INTERNAL MEDICINE

## 2023-08-15 PROCEDURE — 71046 X-RAY EXAM CHEST 2 VIEWS: CPT

## 2023-08-15 PROCEDURE — 36415 COLL VENOUS BLD VENIPUNCTURE: CPT

## 2023-08-15 RX ORDER — AMOXICILLIN AND CLAVULANATE POTASSIUM 875; 125 MG/1; MG/1
1 TABLET, FILM COATED ORAL 2 TIMES DAILY
Qty: 20 TABLET | Refills: 0 | Status: SHIPPED | OUTPATIENT
Start: 2023-08-15 | End: 2023-08-25

## 2023-08-15 RX ORDER — PREDNISONE 10 MG/1
TABLET ORAL
Qty: 18 TABLET | Refills: 0 | Status: SHIPPED | OUTPATIENT
Start: 2023-08-15

## 2023-08-15 RX ORDER — AZITHROMYCIN 250 MG/1
250 TABLET, FILM COATED ORAL DAILY
Qty: 2 TABLET | Refills: 0 | Status: SHIPPED | OUTPATIENT
Start: 2023-08-16 | End: 2023-08-18

## 2023-08-15 RX ADMIN — PREDNISONE 40 MG: 20 TABLET ORAL at 09:19

## 2023-08-15 RX ADMIN — APIXABAN 5 MG: 5 TABLET, FILM COATED ORAL at 09:19

## 2023-08-15 RX ADMIN — PIPERACILLIN AND TAZOBACTAM 3375 MG: 3; .375 INJECTION, POWDER, LYOPHILIZED, FOR SOLUTION INTRAVENOUS at 05:46

## 2023-08-15 RX ADMIN — PIPERACILLIN AND TAZOBACTAM 3375 MG: 3; .375 INJECTION, POWDER, LYOPHILIZED, FOR SOLUTION INTRAVENOUS at 13:50

## 2023-08-15 RX ADMIN — HYDROCODONE BITARTRATE AND ACETAMINOPHEN 1 TABLET: 5; 325 TABLET ORAL at 02:36

## 2023-08-15 RX ADMIN — Medication 10 ML: at 09:22

## 2023-08-15 RX ADMIN — AZITHROMYCIN MONOHYDRATE 500 MG: 500 INJECTION, POWDER, LYOPHILIZED, FOR SOLUTION INTRAVENOUS at 09:53

## 2023-08-15 RX ADMIN — ATORVASTATIN CALCIUM 20 MG: 10 TABLET, FILM COATED ORAL at 09:19

## 2023-08-15 RX ADMIN — POLYETHYLENE GLYCOL (3350) 17 G: 17 POWDER, FOR SOLUTION ORAL at 09:17

## 2023-08-15 ASSESSMENT — PAIN SCALES - GENERAL
PAINLEVEL_OUTOF10: 0
PAINLEVEL_OUTOF10: 0
PAINLEVEL_OUTOF10: 6
PAINLEVEL_OUTOF10: 0

## 2023-08-15 ASSESSMENT — PAIN DESCRIPTION - LOCATION: LOCATION: BACK

## 2023-08-15 ASSESSMENT — PAIN DESCRIPTION - DESCRIPTORS: DESCRIPTORS: ACHING

## 2023-08-15 NOTE — PLAN OF CARE
Problem: Discharge Planning  Goal: Discharge to home or other facility with appropriate resources  8/15/2023 1507 by Anastasia Hyman RN  Outcome: Completed  8/15/2023 1004 by Anastasia Hyman RN  Outcome: Progressing     Problem: Safety - Adult  Goal: Free from fall injury  8/15/2023 1507 by Anastasia Hyman RN  Outcome: Completed  8/15/2023 1004 by Anastasia Hyman RN  Outcome: Progressing     Problem: Pain  Goal: Verbalizes/displays adequate comfort level or baseline comfort level  8/15/2023 1507 by Anastasia Hyman RN  Outcome: Completed  8/15/2023 1004 by Anastasia Hyman RN  Outcome: Progressing

## 2023-08-15 NOTE — PLAN OF CARE
Problem: Discharge Planning  Goal: Discharge to home or other facility with appropriate resources  8/15/2023 1004 by Audelia Cavazos RN  Outcome: Progressing  8/14/2023 2302 by Gerber Bynum RN  Outcome: Progressing     Problem: Safety - Adult  Goal: Free from fall injury  8/15/2023 1004 by Audelia Cavazos RN  Outcome: Progressing  8/14/2023 2302 by Gerber Bynum RN  Outcome: Progressing     Problem: Pain  Goal: Verbalizes/displays adequate comfort level or baseline comfort level  Outcome: Progressing No

## 2023-08-15 NOTE — TELEPHONE ENCOUNTER
Placing order for STAT CXR to be completed AM 8/17/23, please watch for results. We will call pt to potentially either facilitate a thoracentesis (last dose Eliquis was AM 8/15/23) or plan resumption of anticoagulation. (Pt is aware of these instructions, given verbally & in discharge paperwork.)      ASSESSMENT:  CAP, severe  Right-sided effusion - worse today but still small   Hypoxemia - improving   H/O PE   Never smoker     PLAN:  Supplemental oxygen to maintain SaO2 >92%; wean as tolerated    Prednisone 40  Inhaled bronchodilators   Zosyn D#3 days, Azithro D#3/5; can change Zosyn to Augmentin 875 BID x10 days   Hold Eliquis - pt is aware not to take Eliquis at home until she hears further instruction from our office  D/C planning OK from pulmonary perspective, with plan for STAT CXR AM of 8/17/23 (off Eliquis) to re-evaluate effusion. Our office will call with results and recommendations. Strict return precautions given. Business cards/contact info given to patient.

## 2023-08-15 NOTE — PLAN OF CARE
Problem: Discharge Planning  Goal: Discharge to home or other facility with appropriate resources  8/14/2023 2302 by Liza Pradhan RN  Outcome: Progressing  8/14/2023 1029 by Marry Rangel RN  Outcome: Progressing     Problem: Safety - Adult  Goal: Free from fall injury  8/14/2023 2302 by Liza Pradhan RN  Outcome: Progressing  8/14/2023 1029 by Marry Rangel RN  Outcome: Progressing

## 2023-08-15 NOTE — DISCHARGE INSTRUCTIONS
Instructions from pulmonology:   Hold Eliquis until hearing from the pulmonary office instructing to resume. Get a chest x-ray done Thursday morning 8/17/23.   We will call you with results and recommendations moving forward based on how the fluid beside the lung looks at that time.    - Dr. Halima Cerna PA-C  Office #: [phone]

## 2023-08-15 NOTE — CARE COORDINATION
DISCHARGE ORDER  Date/Time 8/15/2023 4:15 PM  Completed by: Tereza Thibodeaux RN, Case Management    Patient Name: Gene Barnes      : 1945  Admitting Diagnosis: Hypokalemia [E87.6]  Hypomagnesemia [E83.42]  Acute respiratory failure with hypoxia ([address]) [J96.01]  Pneumonia due to infectious organism [J18.9]  Multifocal pneumonia [J18.9]      Admit order Date and Status: IP 2023  (verify MD's last order for status of admission)      Noted discharge order. If applicable PT/OT recommendation at Discharge: NA  DME recommendation by PT/OT:NA  Confirmed discharge plan   Discharge Plan: Chart reviewed. Met with pt at bedside and explained the role of the CM. DC home today. IPTA. Pt to [facility] OP for repeat CXR in 2 days. RX filled through [facility] and delivered to bedside. Pt awaiting arrival of family for transport home. No DC needs or barriers identified. Date of Last IMM Given: 2023    Has Home O2 in place on admit:  No  Informed of need to bring portable home O2 tank on day of discharge for nursing to connect prior to leaving:   No  Verbalized agreement/Understanding:   No  Pt is being d/c'd to home today. Pt's O2 sats are 92% on RA. Discharge timeout done with Milena. All discharge needs and concerns addressed.

## 2023-08-15 NOTE — DISCHARGE SUMMARY
Name:  Gregory Smiley  Room:  0211/0211-02  MRN:    [id number]    Discharge Summary      This discharge summary is in conjunction with a complete physical exam done on the day of discharge. Discharging Physician: Dr. Suero Space: 8/13/2023  Discharge:  08/15/23    HPI taken from admission H&P:      Patient is a 80-year-old white female with a past medical history of pulmonary embolism anticoagulated Eliquis, primary hypertension, hyperlipidemia, GERD, anxiety, osteoporosis, osteoarthritis who was in her normal state of health. When she went to bed last night she felt okay but woke up at 1 AM with a nonproductive cough and pain in her chest.  She had significant shortness of breath. She came to the emergency room. She denies any hemoptysis. Work-up in the emergency room showed a new oxygen requirement. She also had a CAT scan of the chest to rule out PE.  CT was negative for pulmonary embolism but showed right-sided pneumonia. Her potassium was slightly low. She is admitted to hospital for treatment of community-acquired pneumonia.         Diagnoses this Admission and Hospital Course     #Acute hypoxia  #CAP, 2/2 gram positive organism  -woke up with right-sided chest pain and cough  -no baseline O2 requirements, initially requiring 5L --> 2L currently  -chest CT showed extensive right-sided pneumonia, no PE  -covid negative, strep and legionella presumptive negative, pneumonia panel pending  -rocephin and zithromax --> zosyn  -monitor pulse ox and wean as able   -po prednisone for wheezing - improving  -pulmonology c/s and following  - patient stable on room air   - PO Augmentin x 10 days  - hold eliquis until follow up with pulmonology in office   - STAT CXR 8/17/23 to re-evaluate effusion   - follow up with pulmonology, they are arranging      #Leukocytosis  -new white count today  -may be 2/2 steroids     #Peripheral edema  -patient brought up concern regarding leg swelling  -very mild

## 2023-08-17 ENCOUNTER — HOSPITAL ENCOUNTER (OUTPATIENT)
Age: 78
Discharge: HOME | End: 2023-08-17
Payer: MEDICARE

## 2023-08-17 ENCOUNTER — HOSPITAL ENCOUNTER (OUTPATIENT)
Dept: GENERAL RADIOLOGY | Age: 78
Discharge: HOME | End: 2023-08-17
Payer: MEDICARE

## 2023-08-17 DIAGNOSIS — J18.9 COMMUNITY ACQUIRED PNEUMONIA OF RIGHT LOWER LOBE OF LUNG: ICD-10-CM

## 2023-08-17 DIAGNOSIS — J90 PLEURAL EFFUSION: ICD-10-CM

## 2023-08-17 LAB
BACTERIA BLD CULT ORG #2: NORMAL
BACTERIA BLD CULT: NORMAL

## 2023-08-17 PROCEDURE — 71046 X-RAY EXAM CHEST 2 VIEWS: CPT

## 2023-08-17 NOTE — TELEPHONE ENCOUNTER
I called patient with great news, CXR improved and parapneumonic effusion resolved. She knows to restart Eliquis and to call with any new concerns.

## 2023-09-19 ENCOUNTER — APPOINTMENT (OUTPATIENT)
Dept: CT IMAGING | Age: 78
End: 2023-09-19
Payer: MEDICARE

## 2023-09-19 ENCOUNTER — HOSPITAL ENCOUNTER (EMERGENCY)
Age: 78
Discharge: HOME | End: 2023-09-19
Attending: EMERGENCY MEDICINE
Payer: MEDICARE

## 2023-09-19 ENCOUNTER — APPOINTMENT (OUTPATIENT)
Dept: GENERAL RADIOLOGY | Age: 78
End: 2023-09-19
Payer: MEDICARE

## 2023-09-19 VITALS
HEART RATE: 84 BPM | TEMPERATURE: 97.8 F | DIASTOLIC BLOOD PRESSURE: 58 MMHG | SYSTOLIC BLOOD PRESSURE: 111 MMHG | WEIGHT: 162 LBS | OXYGEN SATURATION: 93 % | RESPIRATION RATE: 19 BRPM | BODY MASS INDEX: 26.15 KG/M2

## 2023-09-19 DIAGNOSIS — R07.89 ATYPICAL CHEST PAIN: Primary | ICD-10-CM

## 2023-09-19 DIAGNOSIS — R09.1 PLEURISY: ICD-10-CM

## 2023-09-19 DIAGNOSIS — J18.9 PNEUMONIA OF RIGHT LUNG DUE TO INFECTIOUS ORGANISM, UNSPECIFIED PART OF LUNG: ICD-10-CM

## 2023-09-19 LAB
ALBUMIN SERPL-MCNC: 3.7 G/DL (ref 3.4–5)
ALBUMIN/GLOB SERPL: 1.4 {RATIO} (ref 1.1–2.2)
ALP SERPL-CCNC: 92 U/L (ref 40–129)
ALT SERPL-CCNC: 10 U/L (ref 10–40)
ANION GAP SERPL CALCULATED.3IONS-SCNC: 12 MMOL/L (ref 3–16)
AST SERPL-CCNC: 15 U/L (ref 15–37)
BASOPHILS # BLD: 0 K/UL (ref 0–0.2)
BASOPHILS NFR BLD: 0.4 %
BILIRUB SERPL-MCNC: 0.4 MG/DL (ref 0–1)
BUN SERPL-MCNC: 24 MG/DL (ref 7–20)
CALCIUM SERPL-MCNC: 9 MG/DL (ref 8.3–10.6)
CHLORIDE SERPL-SCNC: 96 MMOL/L (ref 99–110)
CO2 SERPL-SCNC: 30 MMOL/L (ref 21–32)
CREAT SERPL-MCNC: 0.8 MG/DL (ref 0.6–1.2)
DEPRECATED RDW RBC AUTO: 14.3 % (ref 12.4–15.4)
EKG ATRIAL RATE: 78 BPM
EKG DIAGNOSIS: NORMAL
EKG P AXIS: 61 DEGREES
EKG P-R INTERVAL: 146 MS
EKG Q-T INTERVAL: 406 MS
EKG QRS DURATION: 94 MS
EKG QTC CALCULATION (BAZETT): 462 MS
EKG R AXIS: 58 DEGREES
EKG T AXIS: 47 DEGREES
EKG VENTRICULAR RATE: 78 BPM
EOSINOPHIL # BLD: 0 K/UL (ref 0–0.6)
EOSINOPHIL NFR BLD: 0.1 %
GFR SERPLBLD CREATININE-BSD FMLA CKD-EPI: >60 ML/MIN/{1.73_M2}
GLUCOSE SERPL-MCNC: 104 MG/DL (ref 70–99)
HCT VFR BLD AUTO: 37 % (ref 36–48)
HGB BLD-MCNC: 12.7 G/DL (ref 12–16)
LIPASE SERPL-CCNC: 27 U/L (ref 13–60)
LYMPHOCYTES # BLD: 0.5 K/UL (ref 1–5.1)
LYMPHOCYTES NFR BLD: 4.1 %
MAGNESIUM SERPL-MCNC: 1.3 MG/DL (ref 1.8–2.4)
MCH RBC QN AUTO: 32.1 PG (ref 26–34)
MCHC RBC AUTO-ENTMCNC: 34.4 G/DL (ref 31–36)
MCV RBC AUTO: 93.5 FL (ref 80–100)
MONOCYTES # BLD: 0.2 K/UL (ref 0–1.3)
MONOCYTES NFR BLD: 1.7 %
NEUTROPHILS # BLD: 10.8 K/UL (ref 1.7–7.7)
NEUTROPHILS NFR BLD: 93.7 %
PLATELET # BLD AUTO: 281 K/UL (ref 135–450)
PMV BLD AUTO: 6.8 FL (ref 5–10.5)
POTASSIUM SERPL-SCNC: 3 MMOL/L (ref 3.5–5.1)
PROCALCITONIN SERPL IA-MCNC: 0.07 NG/ML (ref 0–0.15)
PROT SERPL-MCNC: 6.3 G/DL (ref 6.4–8.2)
RBC # BLD AUTO: 3.96 M/UL (ref 4–5.2)
SODIUM SERPL-SCNC: 138 MMOL/L (ref 136–145)
TROPONIN, HIGH SENSITIVITY: 7 NG/L (ref 0–14)
TROPONIN, HIGH SENSITIVITY: 8 NG/L (ref 0–14)
WBC # BLD AUTO: 11.6 K/UL (ref 4–11)

## 2023-09-19 PROCEDURE — 96365 THER/PROPH/DIAG IV INF INIT: CPT | Performed by: EMERGENCY MEDICINE

## 2023-09-19 PROCEDURE — 96366 THER/PROPH/DIAG IV INF ADDON: CPT | Performed by: EMERGENCY MEDICINE

## 2023-09-19 PROCEDURE — 74177 CT ABD & PELVIS W/CONTRAST: CPT

## 2023-09-19 PROCEDURE — 71046 X-RAY EXAM CHEST 2 VIEWS: CPT

## 2023-09-19 PROCEDURE — 99285 EMERGENCY DEPT VISIT HI MDM: CPT | Performed by: EMERGENCY MEDICINE

## 2023-09-19 PROCEDURE — 36415 COLL VENOUS BLD VENIPUNCTURE: CPT

## 2023-09-19 PROCEDURE — 83690 ASSAY OF LIPASE: CPT

## 2023-09-19 PROCEDURE — 84484 ASSAY OF TROPONIN QUANT: CPT

## 2023-09-19 PROCEDURE — 6360000002 HC RX W HCPCS: Performed by: EMERGENCY MEDICINE

## 2023-09-19 PROCEDURE — 71260 CT THORAX DX C+: CPT

## 2023-09-19 PROCEDURE — 85025 COMPLETE CBC W/AUTO DIFF WBC: CPT

## 2023-09-19 PROCEDURE — 93005 ELECTROCARDIOGRAM TRACING: CPT | Performed by: EMERGENCY MEDICINE

## 2023-09-19 PROCEDURE — 83735 ASSAY OF MAGNESIUM: CPT

## 2023-09-19 PROCEDURE — 96375 TX/PRO/DX INJ NEW DRUG ADDON: CPT | Performed by: EMERGENCY MEDICINE

## 2023-09-19 PROCEDURE — 6360000004 HC RX CONTRAST MEDICATION: Performed by: EMERGENCY MEDICINE

## 2023-09-19 PROCEDURE — 84145 PROCALCITONIN (PCT): CPT

## 2023-09-19 PROCEDURE — 93010 ELECTROCARDIOGRAM REPORT: CPT | Performed by: INTERNAL MEDICINE

## 2023-09-19 PROCEDURE — 6370000000 HC RX 637 (ALT 250 FOR IP): Performed by: EMERGENCY MEDICINE

## 2023-09-19 PROCEDURE — 6360000002 HC RX W HCPCS

## 2023-09-19 PROCEDURE — 96374 THER/PROPH/DIAG INJ IV PUSH: CPT | Performed by: EMERGENCY MEDICINE

## 2023-09-19 PROCEDURE — 80053 COMPREHEN METABOLIC PANEL: CPT

## 2023-09-19 RX ORDER — LEVOFLOXACIN 500 MG/1
500 TABLET, FILM COATED ORAL DAILY
Qty: 5 TABLET | Refills: 0 | Status: SHIPPED | OUTPATIENT
Start: 2023-09-19 | End: 2023-09-24

## 2023-09-19 RX ORDER — LIDOCAINE 50 MG/G
1 PATCH TOPICAL DAILY
Qty: 10 PATCH | Refills: 0 | Status: SHIPPED | OUTPATIENT
Start: 2023-09-19 | End: 2023-09-29

## 2023-09-19 RX ORDER — KETOROLAC TROMETHAMINE 30 MG/ML
15 INJECTION, SOLUTION INTRAMUSCULAR; INTRAVENOUS ONCE
Status: COMPLETED | OUTPATIENT
Start: 2023-09-19 | End: 2023-09-19

## 2023-09-19 RX ORDER — PREDNISONE 50 MG/1
50 TABLET ORAL DAILY
Qty: 5 TABLET | Refills: 0 | Status: SHIPPED | OUTPATIENT
Start: 2023-09-19 | End: 2023-09-24

## 2023-09-19 RX ORDER — LIDOCAINE 4 G/G
1 PATCH TOPICAL ONCE
Status: DISCONTINUED | OUTPATIENT
Start: 2023-09-19 | End: 2023-09-19 | Stop reason: HOSPADM

## 2023-09-19 RX ORDER — MAGNESIUM SULFATE IN WATER 40 MG/ML
2000 INJECTION, SOLUTION INTRAVENOUS ONCE
Status: COMPLETED | OUTPATIENT
Start: 2023-09-19 | End: 2023-09-19

## 2023-09-19 RX ORDER — ACETAMINOPHEN 500 MG
1000 TABLET ORAL ONCE
Status: COMPLETED | OUTPATIENT
Start: 2023-09-19 | End: 2023-09-19

## 2023-09-19 RX ORDER — ONDANSETRON 2 MG/ML
INJECTION INTRAMUSCULAR; INTRAVENOUS
Status: COMPLETED
Start: 2023-09-19 | End: 2023-09-19

## 2023-09-19 RX ORDER — ONDANSETRON 2 MG/ML
4 INJECTION INTRAMUSCULAR; INTRAVENOUS ONCE
Status: COMPLETED | OUTPATIENT
Start: 2023-09-19 | End: 2023-09-19

## 2023-09-19 RX ORDER — POTASSIUM CHLORIDE 20 MEQ/1
60 TABLET, EXTENDED RELEASE ORAL ONCE
Status: COMPLETED | OUTPATIENT
Start: 2023-09-19 | End: 2023-09-19

## 2023-09-19 RX ORDER — PREDNISONE 20 MG/1
60 TABLET ORAL ONCE
Status: COMPLETED | OUTPATIENT
Start: 2023-09-19 | End: 2023-09-19

## 2023-09-19 RX ADMIN — ACETAMINOPHEN 1000 MG: 500 TABLET ORAL at 13:50

## 2023-09-19 RX ADMIN — MAGNESIUM SULFATE HEPTAHYDRATE 2000 MG: 40 INJECTION, SOLUTION INTRAVENOUS at 10:35

## 2023-09-19 RX ADMIN — ONDANSETRON 4 MG: 2 INJECTION INTRAMUSCULAR; INTRAVENOUS at 13:12

## 2023-09-19 RX ADMIN — POTASSIUM CHLORIDE 60 MEQ: 1500 TABLET, EXTENDED RELEASE ORAL at 10:35

## 2023-09-19 RX ADMIN — KETOROLAC TROMETHAMINE 15 MG: 30 INJECTION, SOLUTION INTRAMUSCULAR; INTRAVENOUS at 11:32

## 2023-09-19 RX ADMIN — IOPAMIDOL 75 ML: 755 INJECTION, SOLUTION INTRAVENOUS at 09:23

## 2023-09-19 RX ADMIN — PREDNISONE 60 MG: 20 TABLET ORAL at 13:50

## 2023-09-19 ASSESSMENT — PAIN SCALES - GENERAL: PAINLEVEL_OUTOF10: 3

## 2023-09-19 ASSESSMENT — PAIN - FUNCTIONAL ASSESSMENT: PAIN_FUNCTIONAL_ASSESSMENT: NONE - DENIES PAIN

## 2023-09-19 NOTE — DISCHARGE INSTRUCTIONS
Your evaluation and testing here are overall reassuring. As a conservative measure we are placing on course of antibiotics. Take as prescribed. Follow-up with pulmonology call and arrange follow-up appointment in the upcoming days. Return to emergency department if you have any emergent concerns or worsening symptoms.

## 2023-09-19 NOTE — ED NOTES
1152-Call placed to Pulmonology for consult placed by Dr. Namrata Steiner. Humaira Gill  09/19/23 1152  1155-Call back received from Dr. hCuy Berkowitz spoke with Dr. Namrata Steiner regarding consult.      Humaira Gill  09/19/23 [address]  09/19/23 1205

## 2023-09-19 NOTE — ED PROVIDER NOTES
Emergency Department Provider Note  Location: [address] [place] [address] ED  9/19/2023     Patient Identification  Enrique Whatley is a 66 y.o. female    Chief Complaint  Chest Pain (Chest pain / sob started this morning. Pain has eased up since being here.)          HPI  (History provided by patient)  Patient is a 70-year-old female with a history of GERD, PE on NOAC, recent community-acquired pneumonia with parapneumonic effusion right-sided treated with IV antibiotics transition to Augmentin finished 1 month ago, who presents to the ER with 1 day of pleuritic right-sided chest wall pain and right upper quadrant abdominal pains with breathing. States this is similar to the exact symptoms she had when she presented a month ago was diagnosed with pneumonia and effusion. She denies any cough sputum production fevers or chills. No exertional chest pains diaphoresis or nausea. Pain does not radiate. States she is compliant with her Eliquis. I have reviewed the following nursing documentation:  Allergies: No Known Allergies    Past medical history:  has a past medical history of Anxiety (8/13/2023), Arthritis, Breast cancer ([address]), Full dentures, GERD (gastroesophageal reflux disease), Hypertension, Pneumonia, PONV (postoperative nausea and vomiting), Pulmonary emboli ([address]), Reflux, and Wears glasses. Past surgical history:  has a past surgical history that includes Mastectomy; knee surgery; back surgery; Hysterectomy; fracture surgery; Cholecystectomy (4-); Breast biopsy; colostomy; lymph node dissection; joint replacement (2012); and Axillary Surgery (Left, 6/15/2022). Home medications:   Prior to Admission medications    Medication Sig Start Date End Date Taking?  Authorizing Provider   levoFLOXacin (LEVAQUIN) 500 MG tablet Take 1 tablet by mouth daily for 5 days 9/19/23 9/24/23 Yes Larissa Macias MD   apixaban (ELIQUIS) 5 MG TABS tablet HOLD ELIQUIS 8/15/23   Juancarlos Vasquez MD

## 2023-09-21 ENCOUNTER — OFFICE VISIT (OUTPATIENT)
Dept: PULMONOLOGY | Age: 78
End: 2023-09-21
Payer: MEDICARE

## 2023-09-21 VITALS
RESPIRATION RATE: 16 BRPM | BODY MASS INDEX: 26.87 KG/M2 | HEIGHT: 66 IN | SYSTOLIC BLOOD PRESSURE: 122 MMHG | HEART RATE: 77 BPM | DIASTOLIC BLOOD PRESSURE: 68 MMHG | WEIGHT: 167.2 LBS | OXYGEN SATURATION: 97 %

## 2023-09-21 DIAGNOSIS — R91.1 PULMONARY NODULE: ICD-10-CM

## 2023-09-21 DIAGNOSIS — R93.89 ABNORMAL CT OF THE CHEST: Primary | ICD-10-CM

## 2023-09-21 LAB
EKG ATRIAL RATE: 78 BPM
EKG DIAGNOSIS: NORMAL
EKG P AXIS: 79 DEGREES
EKG P-R INTERVAL: 142 MS
EKG Q-T INTERVAL: 414 MS
EKG QRS DURATION: 100 MS
EKG QTC CALCULATION (BAZETT): 471 MS
EKG R AXIS: 72 DEGREES
EKG T AXIS: 46 DEGREES
EKG VENTRICULAR RATE: 78 BPM

## 2023-09-21 PROCEDURE — 99214 OFFICE O/P EST MOD 30 MIN: CPT | Performed by: INTERNAL MEDICINE

## 2023-09-21 PROCEDURE — 3074F SYST BP LT 130 MM HG: CPT | Performed by: INTERNAL MEDICINE

## 2023-09-21 PROCEDURE — 1123F ACP DISCUSS/DSCN MKR DOCD: CPT | Performed by: INTERNAL MEDICINE

## 2023-09-21 PROCEDURE — 3078F DIAST BP <80 MM HG: CPT | Performed by: INTERNAL MEDICINE

## 2023-09-21 RX ORDER — PREGABALIN 25 MG/1
CAPSULE ORAL
COMMUNITY
Start: 2023-08-17

## 2023-09-21 NOTE — PROGRESS NOTES
tablet, Take 1 tablet by mouth daily for 5 days, Disp: 5 tablet, Rfl: 0    predniSONE (DELTASONE) 50 MG tablet, Take 1 tablet by mouth daily for 5 days, Disp: 5 tablet, Rfl: 0    lidocaine (LIDODERM) 5 %, Place 1 patch onto the skin daily for 10 days 12 hours on, 12 hours off., Disp: 10 patch, Rfl: 0    apixaban (ELIQUIS) 5 MG TABS tablet, HOLD ELIQUIS, Disp: , Rfl:     amLODIPine (NORVASC) 5 MG tablet, Take 1 tablet by mouth daily, Disp: , Rfl:     atorvastatin (LIPITOR) 20 MG tablet, Take 1 tablet by mouth daily, Disp: , Rfl:     omeprazole (PRILOSEC) 20 MG delayed release capsule, TAKE 1 CAPSULE DAILY, Disp: , Rfl:     ALPRAZolam (XANAX) 1 MG tablet, Take 1 tablet by mouth nightly., Disp: , Rfl:     cyanocobalamin 1000 MCG tablet, TAKE 1 TABLET DAILY, Disp: , Rfl:     hydroCHLOROthiazide (HYDRODIURIL) 25 MG tablet, Take by mouth daily Pt not sure of dose, Disp: , Rfl:     alosetron (LOTRONEX) 0.5 MG tablet, Take 1 tablet by mouth 2 times daily As needed per the patient (twice daily), Disp: , Rfl:     escitalopram (LEXAPRO) 10 MG tablet, Take 2 tablets by mouth daily, Disp: , Rfl:     celecoxib (CELEBREX) 200 MG capsule, Take 1 capsule by mouth daily, Disp: , Rfl:       Objective:   PHYSICAL EXAM:    Blood pressure 122/68, pulse 77, resp. rate 16, height 5' 6\" (1.676 m), weight 167 lb 3.2 oz (75.8 kg), SpO2 97 %.' on RA  Gen: No distress. Eyes: PERRL. No sclera icterus. No conjunctival injection. ENT: No discharge. Pharynx clear. Neck: Trachea midline. No obvious mass. Resp: No accessory muscle use. No crackles. No wheezes. No rhonchi. No dullness on percussion. Good air entry. CV: Regular rate. Regular rhythm. No murmur or rub. No edema. GI: Non-tender. Non-distended. No hernia. Skin: Warm and dry. No nodule on exposed extremities. Lymph: No cervical LAD. No supraclavicular LAD. M/S: No cyanosis. No joint deformity. No clubbing. Neuro: Awake. Alert. Moves all four extremities.    Psych:

## 2023-11-01 ENCOUNTER — HOSPITAL ENCOUNTER (OUTPATIENT)
Age: 78
Discharge: HOME | End: 2023-11-01
Payer: MEDICARE

## 2023-11-01 ENCOUNTER — HOSPITAL ENCOUNTER (OUTPATIENT)
Dept: GENERAL RADIOLOGY | Age: 78
Discharge: HOME | End: 2023-11-01
Payer: MEDICARE

## 2023-11-01 ENCOUNTER — OFFICE VISIT (OUTPATIENT)
Dept: PULMONOLOGY | Age: 78
End: 2023-11-01
Payer: MEDICARE

## 2023-11-01 VITALS
DIASTOLIC BLOOD PRESSURE: 70 MMHG | HEIGHT: 66 IN | HEART RATE: 67 BPM | RESPIRATION RATE: 16 BRPM | OXYGEN SATURATION: 93 % | BODY MASS INDEX: 27.42 KG/M2 | SYSTOLIC BLOOD PRESSURE: 124 MMHG | WEIGHT: 170.6 LBS

## 2023-11-01 DIAGNOSIS — R93.89 ABNORMAL CT OF THE CHEST: Primary | ICD-10-CM

## 2023-11-01 DIAGNOSIS — R91.1 PULMONARY NODULE: ICD-10-CM

## 2023-11-01 DIAGNOSIS — R93.89 ABNORMAL CT OF THE CHEST: ICD-10-CM

## 2023-11-01 PROCEDURE — 99213 OFFICE O/P EST LOW 20 MIN: CPT | Performed by: INTERNAL MEDICINE

## 2023-11-01 PROCEDURE — 3074F SYST BP LT 130 MM HG: CPT | Performed by: INTERNAL MEDICINE

## 2023-11-01 PROCEDURE — 3078F DIAST BP <80 MM HG: CPT | Performed by: INTERNAL MEDICINE

## 2023-11-01 PROCEDURE — 1123F ACP DISCUSS/DSCN MKR DOCD: CPT | Performed by: INTERNAL MEDICINE

## 2023-11-01 PROCEDURE — 71046 X-RAY EXAM CHEST 2 VIEWS: CPT

## 2023-11-01 RX ORDER — ESCITALOPRAM OXALATE 20 MG/1
TABLET ORAL
COMMUNITY
Start: 2023-08-25

## 2023-11-01 RX ORDER — HYDROCHLOROTHIAZIDE 12.5 MG/1
CAPSULE, GELATIN COATED ORAL
COMMUNITY
Start: 2023-09-04

## 2023-11-01 NOTE — PROGRESS NOTES
[facility]                                                                    CHIEF COMPLAINT: Follow-up CXR       HPI:   CXR reviewed by me and noted below. Results were dicussed with patient and multiple good questions were answered. She feels so much better  No cough or sputum  No hemoptysis         Past Medical History:   Diagnosis Date    Anxiety 8/13/2023    Arthritis     Breast cancer (HCC)     Full dentures     GERD (gastroesophageal reflux disease)     Hypertension     Pneumonia     PONV (postoperative nausea and vomiting)     Pulmonary emboli (HCC)     Reflux     Wears glasses        Past Surgical History:        Procedure Laterality Date    AXILLARY SURGERY Left 6/15/2022    EXCISION OF LEFT AXILLARY NODE performed by Domenic Nogueira MD at [address] [place] [address]  4-    COLOSTOMY      FRACTURE SURGERY      right wrist    HYSTERECTOMY (CERVIX STATUS UNKNOWN)      JOINT REPLACEMENT  2012    knee    KNEE SURGERY      LYMPH NODE DISSECTION      MASTECTOMY      bilateral       Allergies:  has No Known Allergies. Social History:    TOBACCO:   reports that she has never smoked. She has never used smokeless tobacco.  ETOH:   reports that she does not currently use alcohol after a past usage of about 7.0 standard drinks of alcohol per week.       Family History:       Problem Relation Age of Onset    Breast Cancer Mother         breast    Diabetes Father     High Cholesterol Father     Hypertension Father     Diabetes Paternal Grandmother     Diabetes Paternal Grandfather     Asthma Grandchild     Osteoarthritis Sister     Depression Son     Alcohol Abuse Brother     Emphysema Neg Hx        Current Medications:    Current Outpatient Medications:     escitalopram (LEXAPRO) 20 MG tablet, , Disp: , Rfl:     hydroCHLOROthiazide (MICROZIDE) 12.5 MG capsule, , Disp: , Rfl:     pregabalin (Victorine Every)

## 2023-12-09 ENCOUNTER — APPOINTMENT (OUTPATIENT)
Dept: CT IMAGING | Age: 78
DRG: 177 | End: 2023-12-09
Payer: MEDICARE

## 2023-12-09 ENCOUNTER — HOSPITAL ENCOUNTER (INPATIENT)
Age: 78
LOS: 3 days | Discharge: HOME | DRG: 177 | End: 2023-12-12
Attending: STUDENT IN AN ORGANIZED HEALTH CARE EDUCATION/TRAINING PROGRAM | Admitting: INTERNAL MEDICINE
Payer: MEDICARE

## 2023-12-09 DIAGNOSIS — J18.9 MULTIFOCAL PNEUMONIA: Primary | ICD-10-CM

## 2023-12-09 PROBLEM — E78.5 HLD (HYPERLIPIDEMIA): Status: ACTIVE | Noted: 2023-12-09

## 2023-12-09 PROBLEM — D64.9 NORMOCYTIC ANEMIA: Status: ACTIVE | Noted: 2023-12-09

## 2023-12-09 LAB
ALBUMIN SERPL-MCNC: 3.8 G/DL (ref 3.4–5)
ALBUMIN/GLOB SERPL: 1.2 {RATIO} (ref 1.1–2.2)
ALP SERPL-CCNC: 89 U/L (ref 40–129)
ALT SERPL-CCNC: 14 U/L (ref 10–40)
AMORPH SED URNS QL MICRO: ABNORMAL /HPF
ANION GAP SERPL CALCULATED.3IONS-SCNC: 10 MMOL/L (ref 3–16)
AST SERPL-CCNC: 21 U/L (ref 15–37)
BACTERIA URNS QL MICRO: ABNORMAL /HPF
BASOPHILS # BLD: 0 K/UL (ref 0–0.2)
BASOPHILS NFR BLD: 0.4 %
BILIRUB SERPL-MCNC: 0.6 MG/DL (ref 0–1)
BILIRUB UR QL STRIP.AUTO: NEGATIVE
BUN SERPL-MCNC: 18 MG/DL (ref 7–20)
CALCIUM SERPL-MCNC: 9.5 MG/DL (ref 8.3–10.6)
CHLORIDE SERPL-SCNC: 94 MMOL/L (ref 99–110)
CLARITY UR: CLEAR
CO2 SERPL-SCNC: 29 MMOL/L (ref 21–32)
COLOR UR: YELLOW
CREAT SERPL-MCNC: 0.6 MG/DL (ref 0.6–1.2)
DEPRECATED RDW RBC AUTO: 14.8 % (ref 12.4–15.4)
EKG ATRIAL RATE: 98 BPM
EKG DIAGNOSIS: NORMAL
EKG P AXIS: 93 DEGREES
EKG P-R INTERVAL: 132 MS
EKG Q-T INTERVAL: 362 MS
EKG QRS DURATION: 88 MS
EKG QTC CALCULATION (BAZETT): 462 MS
EKG R AXIS: 45 DEGREES
EKG T AXIS: 27 DEGREES
EKG VENTRICULAR RATE: 98 BPM
EOSINOPHIL # BLD: 0.1 K/UL (ref 0–0.6)
EOSINOPHIL NFR BLD: 1.1 %
EPI CELLS #/AREA URNS HPF: ABNORMAL /HPF (ref 0–5)
FINE GRAN CASTS #/AREA URNS HPF: ABNORMAL /LPF (ref 0–2)
FLUAV RNA RESP QL NAA+PROBE: NOT DETECTED
FLUBV RNA RESP QL NAA+PROBE: NOT DETECTED
GFR SERPLBLD CREATININE-BSD FMLA CKD-EPI: >60 ML/MIN/{1.73_M2}
GLUCOSE SERPL-MCNC: 132 MG/DL (ref 70–99)
GLUCOSE UR STRIP.AUTO-MCNC: NEGATIVE MG/DL
HCT VFR BLD AUTO: 33.6 % (ref 36–48)
HGB BLD-MCNC: 11.5 G/DL (ref 12–16)
HGB UR QL STRIP.AUTO: ABNORMAL
KETONES UR STRIP.AUTO-MCNC: ABNORMAL MG/DL
LACTATE BLDV-SCNC: 1 MMOL/L (ref 0.4–1.9)
LEUKOCYTE ESTERASE UR QL STRIP.AUTO: NEGATIVE
LYMPHOCYTES # BLD: 0.9 K/UL (ref 1–5.1)
LYMPHOCYTES NFR BLD: 12.2 %
MCH RBC QN AUTO: 31.9 PG (ref 26–34)
MCHC RBC AUTO-ENTMCNC: 34.2 G/DL (ref 31–36)
MCV RBC AUTO: 93.4 FL (ref 80–100)
MONOCYTES # BLD: 0.5 K/UL (ref 0–1.3)
MONOCYTES NFR BLD: 6.9 %
NEUTROPHILS # BLD: 6.1 K/UL (ref 1.7–7.7)
NEUTROPHILS NFR BLD: 79.4 %
NITRITE UR QL STRIP.AUTO: NEGATIVE
NT-PROBNP SERPL-MCNC: 240 PG/ML (ref 0–449)
PH UR STRIP.AUTO: 6 [PH] (ref 5–8)
PLATELET # BLD AUTO: 313 K/UL (ref 135–450)
PMV BLD AUTO: 7.4 FL (ref 5–10.5)
POTASSIUM SERPL-SCNC: 3.8 MMOL/L (ref 3.5–5.1)
PROT SERPL-MCNC: 7.1 G/DL (ref 6.4–8.2)
PROT UR STRIP.AUTO-MCNC: ABNORMAL MG/DL
RBC # BLD AUTO: 3.6 M/UL (ref 4–5.2)
RBC #/AREA URNS HPF: ABNORMAL /HPF (ref 0–4)
RENAL EPI CELLS #/AREA UR COMP ASSIST: ABNORMAL /HPF (ref 0–1)
SARS-COV-2 RNA RESP QL NAA+PROBE: NOT DETECTED
SODIUM SERPL-SCNC: 133 MMOL/L (ref 136–145)
SP GR UR STRIP.AUTO: 1.02 (ref 1–1.03)
TROPONIN, HIGH SENSITIVITY: 11 NG/L (ref 0–14)
TROPONIN, HIGH SENSITIVITY: 9 NG/L (ref 0–14)
UA COMPLETE W REFLEX CULTURE PNL UR: ABNORMAL
UA DIPSTICK W REFLEX MICRO PNL UR: YES
URN SPEC COLLECT METH UR: ABNORMAL
UROBILINOGEN UR STRIP-ACNC: 0.2 E.U./DL
WBC # BLD AUTO: 7.6 K/UL (ref 4–11)
WBC #/AREA URNS HPF: ABNORMAL /HPF (ref 0–5)

## 2023-12-09 PROCEDURE — 87636 SARSCOV2 & INF A&B AMP PRB: CPT

## 2023-12-09 PROCEDURE — 2580000003 HC RX 258: Performed by: STUDENT IN AN ORGANIZED HEALTH CARE EDUCATION/TRAINING PROGRAM

## 2023-12-09 PROCEDURE — 6360000004 HC RX CONTRAST MEDICATION: Performed by: NURSE PRACTITIONER

## 2023-12-09 PROCEDURE — 99222 1ST HOSP IP/OBS MODERATE 55: CPT

## 2023-12-09 PROCEDURE — 36415 COLL VENOUS BLD VENIPUNCTURE: CPT

## 2023-12-09 PROCEDURE — 6360000002 HC RX W HCPCS: Performed by: STUDENT IN AN ORGANIZED HEALTH CARE EDUCATION/TRAINING PROGRAM

## 2023-12-09 PROCEDURE — 85025 COMPLETE CBC W/AUTO DIFF WBC: CPT

## 2023-12-09 PROCEDURE — 80053 COMPREHEN METABOLIC PANEL: CPT

## 2023-12-09 PROCEDURE — 81001 URINALYSIS AUTO W/SCOPE: CPT

## 2023-12-09 PROCEDURE — 83880 ASSAY OF NATRIURETIC PEPTIDE: CPT

## 2023-12-09 PROCEDURE — 93005 ELECTROCARDIOGRAM TRACING: CPT | Performed by: STUDENT IN AN ORGANIZED HEALTH CARE EDUCATION/TRAINING PROGRAM

## 2023-12-09 PROCEDURE — 99285 EMERGENCY DEPT VISIT HI MDM: CPT

## 2023-12-09 PROCEDURE — 71260 CT THORAX DX C+: CPT

## 2023-12-09 PROCEDURE — 6370000000 HC RX 637 (ALT 250 FOR IP): Performed by: INTERNAL MEDICINE

## 2023-12-09 PROCEDURE — 83605 ASSAY OF LACTIC ACID: CPT

## 2023-12-09 PROCEDURE — 1200000000 HC SEMI PRIVATE

## 2023-12-09 PROCEDURE — 84484 ASSAY OF TROPONIN QUANT: CPT

## 2023-12-09 PROCEDURE — 87086 URINE CULTURE/COLONY COUNT: CPT

## 2023-12-09 PROCEDURE — 93010 ELECTROCARDIOGRAM REPORT: CPT | Performed by: INTERNAL MEDICINE

## 2023-12-09 PROCEDURE — 6370000000 HC RX 637 (ALT 250 FOR IP)

## 2023-12-09 PROCEDURE — 87040 BLOOD CULTURE FOR BACTERIA: CPT

## 2023-12-09 RX ORDER — ACETAMINOPHEN 325 MG/1
650 TABLET ORAL EVERY 6 HOURS PRN
Status: DISCONTINUED | OUTPATIENT
Start: 2023-12-09 | End: 2023-12-12 | Stop reason: HOSPADM

## 2023-12-09 RX ORDER — IPRATROPIUM BROMIDE AND ALBUTEROL SULFATE 2.5; .5 MG/3ML; MG/3ML
1 SOLUTION RESPIRATORY (INHALATION) EVERY 4 HOURS PRN
Status: DISCONTINUED | OUTPATIENT
Start: 2023-12-09 | End: 2023-12-12 | Stop reason: HOSPADM

## 2023-12-09 RX ORDER — SODIUM CHLORIDE 9 MG/ML
INJECTION, SOLUTION INTRAVENOUS PRN
Status: DISCONTINUED | OUTPATIENT
Start: 2023-12-09 | End: 2023-12-12 | Stop reason: HOSPADM

## 2023-12-09 RX ORDER — ENOXAPARIN SODIUM 100 MG/ML
40 INJECTION SUBCUTANEOUS DAILY
Status: CANCELLED | OUTPATIENT
Start: 2023-12-09

## 2023-12-09 RX ORDER — POLYETHYLENE GLYCOL 3350 17 G/17G
17 POWDER, FOR SOLUTION ORAL DAILY PRN
Status: DISCONTINUED | OUTPATIENT
Start: 2023-12-09 | End: 2023-12-12 | Stop reason: HOSPADM

## 2023-12-09 RX ORDER — IPRATROPIUM BROMIDE AND ALBUTEROL SULFATE 2.5; .5 MG/3ML; MG/3ML
1 SOLUTION RESPIRATORY (INHALATION)
Status: DISCONTINUED | OUTPATIENT
Start: 2023-12-09 | End: 2023-12-09

## 2023-12-09 RX ORDER — ESCITALOPRAM OXALATE 10 MG/1
20 TABLET ORAL DAILY
Status: DISCONTINUED | OUTPATIENT
Start: 2023-12-09 | End: 2023-12-12 | Stop reason: HOSPADM

## 2023-12-09 RX ORDER — ONDANSETRON 2 MG/ML
4 INJECTION INTRAMUSCULAR; INTRAVENOUS EVERY 6 HOURS PRN
Status: DISCONTINUED | OUTPATIENT
Start: 2023-12-09 | End: 2023-12-12 | Stop reason: HOSPADM

## 2023-12-09 RX ORDER — AZITHROMYCIN 500 MG/1
INJECTION, POWDER, LYOPHILIZED, FOR SOLUTION INTRAVENOUS
Status: DISPENSED
Start: 2023-12-09 | End: 2023-12-10

## 2023-12-09 RX ORDER — ATORVASTATIN CALCIUM 10 MG/1
20 TABLET, FILM COATED ORAL DAILY
Status: DISCONTINUED | OUTPATIENT
Start: 2023-12-09 | End: 2023-12-12 | Stop reason: HOSPADM

## 2023-12-09 RX ORDER — ACETAMINOPHEN 650 MG/1
650 SUPPOSITORY RECTAL EVERY 6 HOURS PRN
Status: DISCONTINUED | OUTPATIENT
Start: 2023-12-09 | End: 2023-12-12 | Stop reason: HOSPADM

## 2023-12-09 RX ORDER — ALPRAZOLAM 1 MG/1
1 TABLET ORAL NIGHTLY
Status: DISCONTINUED | OUTPATIENT
Start: 2023-12-09 | End: 2023-12-12 | Stop reason: HOSPADM

## 2023-12-09 RX ORDER — AMLODIPINE BESYLATE 5 MG/1
5 TABLET ORAL DAILY
Status: DISCONTINUED | OUTPATIENT
Start: 2023-12-09 | End: 2023-12-12 | Stop reason: HOSPADM

## 2023-12-09 RX ORDER — ONDANSETRON 4 MG/1
4 TABLET, ORALLY DISINTEGRATING ORAL EVERY 8 HOURS PRN
Status: DISCONTINUED | OUTPATIENT
Start: 2023-12-09 | End: 2023-12-12 | Stop reason: HOSPADM

## 2023-12-09 RX ORDER — ESCITALOPRAM OXALATE 20 MG/1
20 TABLET ORAL DAILY
COMMUNITY

## 2023-12-09 RX ORDER — SODIUM CHLORIDE 0.9 % (FLUSH) 0.9 %
5-40 SYRINGE (ML) INJECTION PRN
Status: DISCONTINUED | OUTPATIENT
Start: 2023-12-09 | End: 2023-12-12 | Stop reason: HOSPADM

## 2023-12-09 RX ORDER — SODIUM CHLORIDE 0.9 % (FLUSH) 0.9 %
5-40 SYRINGE (ML) INJECTION EVERY 12 HOURS SCHEDULED
Status: DISCONTINUED | OUTPATIENT
Start: 2023-12-09 | End: 2023-12-12 | Stop reason: HOSPADM

## 2023-12-09 RX ORDER — PANTOPRAZOLE SODIUM 40 MG/1
40 TABLET, DELAYED RELEASE ORAL
Status: DISCONTINUED | OUTPATIENT
Start: 2023-12-10 | End: 2023-12-12 | Stop reason: HOSPADM

## 2023-12-09 RX ADMIN — ATORVASTATIN CALCIUM 20 MG: 10 TABLET, FILM COATED ORAL at 21:40

## 2023-12-09 RX ADMIN — AMLODIPINE BESYLATE 5 MG: 5 TABLET ORAL at 21:40

## 2023-12-09 RX ADMIN — AZITHROMYCIN MONOHYDRATE 500 MG: 500 INJECTION, POWDER, LYOPHILIZED, FOR SOLUTION INTRAVENOUS at 17:17

## 2023-12-09 RX ADMIN — ALPRAZOLAM 1 MG: 1 TABLET ORAL at 21:40

## 2023-12-09 RX ADMIN — VANCOMYCIN HYDROCHLORIDE 1250 MG: 10 INJECTION, POWDER, LYOPHILIZED, FOR SOLUTION INTRAVENOUS at 19:06

## 2023-12-09 RX ADMIN — APIXABAN 5 MG: 5 TABLET, FILM COATED ORAL at 21:40

## 2023-12-09 RX ADMIN — CEFEPIME 2000 MG: 2 INJECTION, POWDER, FOR SOLUTION INTRAVENOUS at 18:26

## 2023-12-09 RX ADMIN — IOPAMIDOL 75 ML: 755 INJECTION, SOLUTION INTRAVENOUS at 15:25

## 2023-12-09 RX ADMIN — ESCITALOPRAM OXALATE 20 MG: 10 TABLET ORAL at 21:40

## 2023-12-09 ASSESSMENT — ENCOUNTER SYMPTOMS
SORE THROAT: 0
RHINORRHEA: 0
SHORTNESS OF BREATH: 1
COLOR CHANGE: 0
FACIAL SWELLING: 0
ABDOMINAL PAIN: 0

## 2023-12-09 ASSESSMENT — LIFESTYLE VARIABLES
HOW MANY STANDARD DRINKS CONTAINING ALCOHOL DO YOU HAVE ON A TYPICAL DAY: PATIENT DOES NOT DRINK
HOW OFTEN DO YOU HAVE A DRINK CONTAINING ALCOHOL: NEVER

## 2023-12-09 ASSESSMENT — PAIN SCALES - GENERAL
PAINLEVEL_OUTOF10: 0
PAINLEVEL_OUTOF10: 0

## 2023-12-09 ASSESSMENT — PAIN - FUNCTIONAL ASSESSMENT: PAIN_FUNCTIONAL_ASSESSMENT: 0-10

## 2023-12-09 NOTE — ED PROVIDER NOTES
included in the SEP-1 Core Measure due to severe sepsis or septic shock? No   Exclusion criteria - the patient is NOT to be included for SEP-1 Core Measure due to: Infection is not suspected         The patient tolerated their visit well. I have evaluated this patient. My supervising physician was available for consultation. The patient and / or the family were informed of the results of any tests, a time was given to answer questions, a plan was proposed and they agreed with plan. FINAL IMPRESSION      1. Multifocal pneumonia          DISPOSITION/PLAN   DISPOSITION Admitted 12/09/2023 05:01:55 PM      PATIENT REFERRED TO:  No follow-up provider specified. DISCHARGE MEDICATIONS:  New Prescriptions    No medications on file       DISCONTINUED MEDICATIONS:  Discontinued Medications    No medications on file              (Please note that portions of this note were completed with a voice recognition program.  Efforts were made to edit the dictations but occasionally words are mis-transcribed.)    [facility]. N95 and appropriate PPE was worn during the visit.       JOSE Goodrich CNP (electronically signed)        JOSE Goodrich CNP  12/09/23 9184

## 2023-12-09 NOTE — H&P
Hospital Medicine History & Physical      PCP: Edi Patino DO    Date of Admission: 12/9/2023    Date of Service: Pt seen/examined on 12/09/23     Chief Complaint:    Chief Complaint   Patient presents with    Shortness of Breath     States some sob, denies cp. States this morning oxygen level dropped below 90%, currently is 95% on RA. Pt states increased fatigue. History Of Present Illness: The patient is a 66 y.o. female with pmhx as below who presents to [facility] with shortness of breath for the last several days. She was seen by her PCP and started on Augmentin but has felt no improvement in her symptoms. Apparently also was admitted recently for pneumonia. She reports intermittent productive cough, low grade fever. She denies any chest pain. History obtained from the patient and review of EMR. Past Medical History:        Diagnosis Date    Anxiety 08/13/2023    Arthritis     Bilateral lower extremity edema     Breast cancer (HCC)     Full dentures     GERD (gastroesophageal reflux disease)     HLD (hyperlipidemia)     Hypertension     Pneumonia     Polyneuropathy     PONV (postoperative nausea and vomiting)     Pulmonary emboli (HCC)     Reflux     Wears glasses        Past Surgical History:        Procedure Laterality Date    AXILLARY SURGERY Left 6/15/2022    EXCISION OF LEFT AXILLARY NODE performed by Tessa Clark MD at [address] [place] [address]  4-    COLOSTOMY      FRACTURE SURGERY      right wrist    HYSTERECTOMY (CERVIX STATUS UNKNOWN)      JOINT REPLACEMENT  2012    knee    KNEE SURGERY      LYMPH NODE DISSECTION      MASTECTOMY      bilateral       Medications Prior to Admission:    Prior to Admission medications    Medication Sig Start Date End Date Taking?  Authorizing Provider   escitalopram (LEXAPRO) 20 MG tablet Take 1 tablet by mouth daily   Yes Provider, MD Skylar   escitalopram (LEXAPRO) 20 MG tablet  8/25/23

## 2023-12-09 NOTE — ED NOTES
EKG completed and provided with old EKG to provider for review Sorin, [address], [facility]  12/09/23 8557

## 2023-12-09 NOTE — ED NOTES
PT was speaking to Dr. Deandra Stevens, while doing so pt O2 sat went to 87% on RA. Provider placed pt on 2LPM NC.       Nav Ocampo, [address] [place] [address]  12/09/23 4475

## 2023-12-10 LAB
ANION GAP SERPL CALCULATED.3IONS-SCNC: 12 MMOL/L (ref 3–16)
BACTERIA UR CULT: NORMAL
BASOPHILS # BLD: 0 K/UL (ref 0–0.2)
BASOPHILS NFR BLD: 0.2 %
BUN SERPL-MCNC: 11 MG/DL (ref 7–20)
CALCIUM SERPL-MCNC: 8.7 MG/DL (ref 8.3–10.6)
CHLORIDE SERPL-SCNC: 98 MMOL/L (ref 99–110)
CO2 SERPL-SCNC: 24 MMOL/L (ref 21–32)
CREAT SERPL-MCNC: <0.5 MG/DL (ref 0.6–1.2)
DEPRECATED RDW RBC AUTO: 14.5 % (ref 12.4–15.4)
EOSINOPHIL # BLD: 0.1 K/UL (ref 0–0.6)
EOSINOPHIL NFR BLD: 1.3 %
ERYTHROCYTE [SEDIMENTATION RATE] IN BLOOD BY WESTERGREN METHOD: 105 MM/HR (ref 0–30)
GFR SERPLBLD CREATININE-BSD FMLA CKD-EPI: >60 ML/MIN/{1.73_M2}
GLUCOSE SERPL-MCNC: 113 MG/DL (ref 70–99)
HCT VFR BLD AUTO: 34.1 % (ref 36–48)
HGB BLD-MCNC: 11.9 G/DL (ref 12–16)
LYMPHOCYTES # BLD: 1.2 K/UL (ref 1–5.1)
LYMPHOCYTES NFR BLD: 17.7 %
MCH RBC QN AUTO: 32.4 PG (ref 26–34)
MCHC RBC AUTO-ENTMCNC: 34.8 G/DL (ref 31–36)
MCV RBC AUTO: 93 FL (ref 80–100)
MONOCYTES # BLD: 0.6 K/UL (ref 0–1.3)
MONOCYTES NFR BLD: 8.4 %
NEUTROPHILS # BLD: 4.9 K/UL (ref 1.7–7.7)
NEUTROPHILS NFR BLD: 72.4 %
NT-PROBNP SERPL-MCNC: 706 PG/ML (ref 0–449)
PLATELET # BLD AUTO: 319 K/UL (ref 135–450)
PMV BLD AUTO: 6.8 FL (ref 5–10.5)
POTASSIUM SERPL-SCNC: 3.6 MMOL/L (ref 3.5–5.1)
PROCALCITONIN SERPL IA-MCNC: 0.25 NG/ML (ref 0–0.15)
RBC # BLD AUTO: 3.66 M/UL (ref 4–5.2)
SODIUM SERPL-SCNC: 134 MMOL/L (ref 136–145)
VANCOMYCIN SERPL-MCNC: 8.3 UG/ML
WBC # BLD AUTO: 6.7 K/UL (ref 4–11)

## 2023-12-10 PROCEDURE — 87641 MR-STAPH DNA AMP PROBE: CPT

## 2023-12-10 PROCEDURE — 80048 BASIC METABOLIC PNL TOTAL CA: CPT

## 2023-12-10 PROCEDURE — 92610 EVALUATE SWALLOWING FUNCTION: CPT

## 2023-12-10 PROCEDURE — 84145 PROCALCITONIN (PCT): CPT

## 2023-12-10 PROCEDURE — 1200000000 HC SEMI PRIVATE

## 2023-12-10 PROCEDURE — 83880 ASSAY OF NATRIURETIC PEPTIDE: CPT

## 2023-12-10 PROCEDURE — 6370000000 HC RX 637 (ALT 250 FOR IP): Performed by: INTERNAL MEDICINE

## 2023-12-10 PROCEDURE — 85652 RBC SED RATE AUTOMATED: CPT

## 2023-12-10 PROCEDURE — 36415 COLL VENOUS BLD VENIPUNCTURE: CPT

## 2023-12-10 PROCEDURE — 6360000002 HC RX W HCPCS: Performed by: INTERNAL MEDICINE

## 2023-12-10 PROCEDURE — 80202 ASSAY OF VANCOMYCIN: CPT

## 2023-12-10 PROCEDURE — 6370000000 HC RX 637 (ALT 250 FOR IP)

## 2023-12-10 PROCEDURE — 99223 1ST HOSP IP/OBS HIGH 75: CPT | Performed by: INTERNAL MEDICINE

## 2023-12-10 PROCEDURE — 2580000003 HC RX 258: Performed by: INTERNAL MEDICINE

## 2023-12-10 PROCEDURE — 99232 SBSQ HOSP IP/OBS MODERATE 35: CPT

## 2023-12-10 PROCEDURE — 92526 ORAL FUNCTION THERAPY: CPT

## 2023-12-10 PROCEDURE — 85025 COMPLETE CBC W/AUTO DIFF WBC: CPT

## 2023-12-10 RX ADMIN — CEFEPIME 2000 MG: 2 INJECTION, POWDER, FOR SOLUTION INTRAVENOUS at 01:01

## 2023-12-10 RX ADMIN — ESCITALOPRAM OXALATE 20 MG: 10 TABLET ORAL at 10:29

## 2023-12-10 RX ADMIN — ACETAMINOPHEN 650 MG: 325 TABLET ORAL at 10:34

## 2023-12-10 RX ADMIN — AZITHROMYCIN MONOHYDRATE 500 MG: 500 INJECTION, POWDER, LYOPHILIZED, FOR SOLUTION INTRAVENOUS at 17:15

## 2023-12-10 RX ADMIN — ACETAMINOPHEN 650 MG: 325 TABLET ORAL at 17:18

## 2023-12-10 RX ADMIN — VANCOMYCIN HYDROCHLORIDE 1000 MG: 1 INJECTION, POWDER, LYOPHILIZED, FOR SOLUTION INTRAVENOUS at 03:41

## 2023-12-10 RX ADMIN — CEFEPIME 2000 MG: 2 INJECTION, POWDER, FOR SOLUTION INTRAVENOUS at 17:56

## 2023-12-10 RX ADMIN — APIXABAN 5 MG: 5 TABLET, FILM COATED ORAL at 19:58

## 2023-12-10 RX ADMIN — Medication 5 ML: at 10:28

## 2023-12-10 RX ADMIN — Medication 1500 MG: at 15:41

## 2023-12-10 RX ADMIN — APIXABAN 5 MG: 5 TABLET, FILM COATED ORAL at 10:29

## 2023-12-10 RX ADMIN — CEFEPIME 2000 MG: 2 INJECTION, POWDER, FOR SOLUTION INTRAVENOUS at 10:34

## 2023-12-10 RX ADMIN — ALPRAZOLAM 1 MG: 1 TABLET ORAL at 19:58

## 2023-12-10 RX ADMIN — ATORVASTATIN CALCIUM 20 MG: 10 TABLET, FILM COATED ORAL at 10:29

## 2023-12-10 RX ADMIN — SODIUM CHLORIDE 25 ML: 9 INJECTION, SOLUTION INTRAVENOUS at 10:31

## 2023-12-10 RX ADMIN — PANTOPRAZOLE SODIUM 40 MG: 40 TABLET, DELAYED RELEASE ORAL at 07:07

## 2023-12-10 RX ADMIN — Medication 10 ML: at 19:59

## 2023-12-10 ASSESSMENT — PAIN - FUNCTIONAL ASSESSMENT
PAIN_FUNCTIONAL_ASSESSMENT: ACTIVITIES ARE NOT PREVENTED
PAIN_FUNCTIONAL_ASSESSMENT: ACTIVITIES ARE NOT PREVENTED

## 2023-12-10 ASSESSMENT — PAIN DESCRIPTION - ORIENTATION
ORIENTATION: ANTERIOR
ORIENTATION: ANTERIOR

## 2023-12-10 ASSESSMENT — PAIN SCALES - GENERAL
PAINLEVEL_OUTOF10: 3
PAINLEVEL_OUTOF10: 5
PAINLEVEL_OUTOF10: 2

## 2023-12-10 ASSESSMENT — PAIN DESCRIPTION - PAIN TYPE
TYPE: ACUTE PAIN
TYPE: ACUTE PAIN

## 2023-12-10 ASSESSMENT — PAIN DESCRIPTION - LOCATION
LOCATION: HEAD
LOCATION: HEAD

## 2023-12-10 ASSESSMENT — PAIN DESCRIPTION - ONSET
ONSET: GRADUAL
ONSET: GRADUAL

## 2023-12-10 ASSESSMENT — PAIN DESCRIPTION - FREQUENCY
FREQUENCY: CONTINUOUS
FREQUENCY: CONTINUOUS

## 2023-12-10 ASSESSMENT — PAIN DESCRIPTION - DESCRIPTORS
DESCRIPTORS: ACHING
DESCRIPTORS: ACHING

## 2023-12-10 NOTE — FLOWSHEET NOTE
Pt A/Ox4. Obtained VS-Low grade fever noted. Pt reported headache and feeling weak. Pain 5/10, see assessment below. Pt unlabored; respirations even, regular, effortless. RA. No distress noted. Shift assessment complete. See flowsheet. AM med's given except Norvasc-pt refused. PRN Tylenol given for headache. See MAR. Pt voiced her PCP advised her to hold off on taking Norvasc for few days d/t BLE swelling. Will re-check BP. Denies needs at this time. Side rails 2/4. Bed in low position. Call light within reach. Bedside Mobility Assessment Tool (BMAT):     Assessment Level 1- Sit and Shake    1. From a semi-reclined position, ask patient to sit up and rotate to a seated position at the side of the bed. Can use the bedrail. 2. Ask patient to reach out and grab your hand and shake making sure patient reaches across his/her midline. Pass- Patient is able to come to a seated position, maintain core strength. Maintains seated balance while reaching across midline. Move on to Assessment Level 2. Assessment Level 2- Stretch and Point   1. With patient in seated position at the side of the bed, have patient place both feet on the floor (or stool) with knees no higher than hips. 2. Ask patient to stretch one leg and straighten the knee, then bend the ankle/flex and point the toes. If appropriate, repeat with the other leg. Pass- Patient is able to demonstrate appropriate quad strength on intended weight bearing limb(s). Move onto Assessment Level 3. Assessment Level 3- Stand   1. Ask patient to elevate off the bed or chair (seated to standing) using an assistive device (cane, bedrail). 2. Patient should be able to raise buttocks off be and hold for a count of five. May repeat once. Pass- Patient maintains standing stability for at least 5 seconds, proceed to assessment level 4. Assessment Level 4- Walk   1. Ask patient to march in place at bedside.     2. Then ask patient to advance step and

## 2023-12-10 NOTE — FLOWSHEET NOTE
Pt c/o headache. PRN tylenol given. Decreased stimulation.        12/10/23 3802   Pain Assessment   Pain Assessment 0-10   Pain Level 3   Patient's Stated Pain Goal 0 - No pain   Pain Location Head   Pain Orientation Anterior   Pain Descriptors Aching   Functional Pain Assessment Activities are not prevented   Pain Type Acute pain   Pain Frequency Continuous   Pain Onset Gradual   Non-Pharmaceutical Pain Intervention(s) Environmental changes

## 2023-12-10 NOTE — PLAN OF CARE
Problem: SLP Adult - Impaired Swallowing  Goal: By Discharge: Advance to least restrictive diet without signs or symptoms of aspiration for planned discharge setting. See evaluation for individualized goals. Note: SLP completed evaluation. Please refer to notes in EMR.     Arnel Wan M.A., CF-SLP #[id number]  Speech-Language Pathologist  Desk: [phone]

## 2023-12-10 NOTE — PLAN OF CARE
Problem: Discharge Planning  Goal: Discharge to home or other facility with appropriate resources  Outcome: Progressing  Flowsheets (Taken 12/10/2023 1030)  Discharge to home or other facility with appropriate resources: Identify barriers to discharge with patient and caregiver     Problem: Pain  Goal: Verbalizes/displays adequate comfort level or baseline comfort level  Outcome: Progressing  Flowsheets (Taken 12/10/2023 1022)  Verbalizes/displays adequate comfort level or baseline comfort level:   Encourage patient to monitor pain and request assistance   Assess pain using appropriate pain scale   Implement non-pharmacological measures as appropriate and evaluate response     Problem: Safety - Adult  Goal: Free from fall injury  Outcome: Progressing     Problem: Respiratory - Adult  Goal: Achieves optimal ventilation and oxygenation  Outcome: Progressing  Flowsheets (Taken 12/10/2023 1030)  Achieves optimal ventilation and oxygenation:   Assess for changes in respiratory status   Assess for changes in mentation and behavior   Position to facilitate oxygenation and minimize respiratory effort   Assess and instruct to report shortness of breath or any respiratory difficulty   Respiratory therapy support as indicated     Problem: Gastrointestinal - Adult  Goal: Maintains or returns to baseline bowel function  Outcome: Progressing  Flowsheets (Taken 12/10/2023 1030)  Maintains or returns to baseline bowel function:   Assess bowel function   Encourage oral fluids to ensure adequate hydration   Administer ordered medications as needed

## 2023-12-11 PROBLEM — D72.819 LEUKOPENIA: Status: ACTIVE | Noted: 2023-12-11

## 2023-12-11 PROBLEM — J96.01 ACUTE HYPOXEMIC RESPIRATORY FAILURE (HCC): Status: ACTIVE | Noted: 2023-12-11

## 2023-12-11 PROBLEM — J98.8 RECURRENT RESPIRATORY INFECTION: Status: ACTIVE | Noted: 2023-12-11

## 2023-12-11 LAB
ANION GAP SERPL CALCULATED.3IONS-SCNC: 9 MMOL/L (ref 3–16)
BASOPHILS # BLD: 0 K/UL (ref 0–0.2)
BASOPHILS NFR BLD: 0.5 %
BUN SERPL-MCNC: 11 MG/DL (ref 7–20)
CALCIUM SERPL-MCNC: 8.5 MG/DL (ref 8.3–10.6)
CHLORIDE SERPL-SCNC: 98 MMOL/L (ref 99–110)
CO2 SERPL-SCNC: 26 MMOL/L (ref 21–32)
CREAT SERPL-MCNC: <0.5 MG/DL (ref 0.6–1.2)
DEPRECATED RDW RBC AUTO: 14.5 % (ref 12.4–15.4)
EOSINOPHIL # BLD: 0.2 K/UL (ref 0–0.6)
EOSINOPHIL NFR BLD: 2.5 %
GFR SERPLBLD CREATININE-BSD FMLA CKD-EPI: >60 ML/MIN/{1.73_M2}
GLUCOSE SERPL-MCNC: 102 MG/DL (ref 70–99)
HCT VFR BLD AUTO: 34.4 % (ref 36–48)
HGB BLD-MCNC: 11.8 G/DL (ref 12–16)
LYMPHOCYTES # BLD: 1.2 K/UL (ref 1–5.1)
LYMPHOCYTES NFR BLD: 19 %
MAGNESIUM SERPL-MCNC: 1.7 MG/DL (ref 1.8–2.4)
MCH RBC QN AUTO: 32.2 PG (ref 26–34)
MCHC RBC AUTO-ENTMCNC: 34.3 G/DL (ref 31–36)
MCV RBC AUTO: 94 FL (ref 80–100)
MONOCYTES # BLD: 0.5 K/UL (ref 0–1.3)
MONOCYTES NFR BLD: 8.1 %
NEUTROPHILS # BLD: 4.5 K/UL (ref 1.7–7.7)
NEUTROPHILS NFR BLD: 69.9 %
PLATELET # BLD AUTO: 332 K/UL (ref 135–450)
PMV BLD AUTO: 6.5 FL (ref 5–10.5)
PNEUMONIA PANEL MOLECULAR: NORMAL
POTASSIUM SERPL-SCNC: 3.4 MMOL/L (ref 3.5–5.1)
RBC # BLD AUTO: 3.66 M/UL (ref 4–5.2)
REPORT: NORMAL
SODIUM SERPL-SCNC: 133 MMOL/L (ref 136–145)
WBC # BLD AUTO: 6.5 K/UL (ref 4–11)

## 2023-12-11 PROCEDURE — 97530 THERAPEUTIC ACTIVITIES: CPT

## 2023-12-11 PROCEDURE — 6360000002 HC RX W HCPCS: Performed by: INTERNAL MEDICINE

## 2023-12-11 PROCEDURE — 36415 COLL VENOUS BLD VENIPUNCTURE: CPT

## 2023-12-11 PROCEDURE — 92526 ORAL FUNCTION THERAPY: CPT

## 2023-12-11 PROCEDURE — 97165 OT EVAL LOW COMPLEX 30 MIN: CPT

## 2023-12-11 PROCEDURE — 93306 TTE W/DOPPLER COMPLETE: CPT

## 2023-12-11 PROCEDURE — 2580000003 HC RX 258: Performed by: INTERNAL MEDICINE

## 2023-12-11 PROCEDURE — 80048 BASIC METABOLIC PNL TOTAL CA: CPT

## 2023-12-11 PROCEDURE — 87633 RESP VIRUS 12-25 TARGETS: CPT

## 2023-12-11 PROCEDURE — 99233 SBSQ HOSP IP/OBS HIGH 50: CPT | Performed by: INTERNAL MEDICINE

## 2023-12-11 PROCEDURE — 1200000000 HC SEMI PRIVATE

## 2023-12-11 PROCEDURE — 6370000000 HC RX 637 (ALT 250 FOR IP)

## 2023-12-11 PROCEDURE — 97161 PT EVAL LOW COMPLEX 20 MIN: CPT

## 2023-12-11 PROCEDURE — 85025 COMPLETE CBC W/AUTO DIFF WBC: CPT

## 2023-12-11 PROCEDURE — 6370000000 HC RX 637 (ALT 250 FOR IP): Performed by: INTERNAL MEDICINE

## 2023-12-11 PROCEDURE — 87070 CULTURE OTHR SPECIMN AEROBIC: CPT

## 2023-12-11 PROCEDURE — 87205 SMEAR GRAM STAIN: CPT

## 2023-12-11 PROCEDURE — 83735 ASSAY OF MAGNESIUM: CPT

## 2023-12-11 PROCEDURE — 97110 THERAPEUTIC EXERCISES: CPT

## 2023-12-11 RX ORDER — DIMETHICONE, OXYBENZONE, AND PADIMATE O 2; 2.5; 6.6 G/100G; G/100G; G/100G
STICK TOPICAL
Status: DISPENSED
Start: 2023-12-11 | End: 2023-12-11

## 2023-12-11 RX ORDER — POTASSIUM CHLORIDE 20 MEQ/1
40 TABLET, EXTENDED RELEASE ORAL ONCE
Status: COMPLETED | OUTPATIENT
Start: 2023-12-11 | End: 2023-12-11

## 2023-12-11 RX ORDER — MAGNESIUM SULFATE IN WATER 40 MG/ML
2000 INJECTION, SOLUTION INTRAVENOUS ONCE
Status: COMPLETED | OUTPATIENT
Start: 2023-12-11 | End: 2023-12-11

## 2023-12-11 RX ORDER — CELECOXIB 100 MG/1
100 CAPSULE ORAL ONCE
Status: COMPLETED | OUTPATIENT
Start: 2023-12-11 | End: 2023-12-11

## 2023-12-11 RX ADMIN — ESCITALOPRAM OXALATE 20 MG: 10 TABLET ORAL at 10:13

## 2023-12-11 RX ADMIN — ACETAMINOPHEN 650 MG: 325 TABLET ORAL at 10:13

## 2023-12-11 RX ADMIN — ONDANSETRON 4 MG: 4 TABLET, ORALLY DISINTEGRATING ORAL at 20:00

## 2023-12-11 RX ADMIN — CEFEPIME 2000 MG: 2 INJECTION, POWDER, FOR SOLUTION INTRAVENOUS at 11:23

## 2023-12-11 RX ADMIN — AZITHROMYCIN MONOHYDRATE 500 MG: 500 INJECTION, POWDER, LYOPHILIZED, FOR SOLUTION INTRAVENOUS at 18:42

## 2023-12-11 RX ADMIN — APIXABAN 5 MG: 5 TABLET, FILM COATED ORAL at 10:13

## 2023-12-11 RX ADMIN — ACETAMINOPHEN 650 MG: 325 TABLET ORAL at 00:41

## 2023-12-11 RX ADMIN — APIXABAN 5 MG: 5 TABLET, FILM COATED ORAL at 21:55

## 2023-12-11 RX ADMIN — CELECOXIB 100 MG: 100 CAPSULE ORAL at 11:27

## 2023-12-11 RX ADMIN — CEFEPIME 2000 MG: 2 INJECTION, POWDER, FOR SOLUTION INTRAVENOUS at 00:46

## 2023-12-11 RX ADMIN — POTASSIUM CHLORIDE 40 MEQ: 1500 TABLET, EXTENDED RELEASE ORAL at 11:27

## 2023-12-11 RX ADMIN — ACETAMINOPHEN 650 MG: 325 TABLET ORAL at 21:55

## 2023-12-11 RX ADMIN — ALPRAZOLAM 1 MG: 1 TABLET ORAL at 21:56

## 2023-12-11 RX ADMIN — Medication 1500 MG: at 03:38

## 2023-12-11 RX ADMIN — ATORVASTATIN CALCIUM 20 MG: 10 TABLET, FILM COATED ORAL at 10:13

## 2023-12-11 RX ADMIN — MAGNESIUM SULFATE HEPTAHYDRATE 2000 MG: 40 INJECTION, SOLUTION INTRAVENOUS at 15:33

## 2023-12-11 RX ADMIN — CEFEPIME 2000 MG: 2 INJECTION, POWDER, FOR SOLUTION INTRAVENOUS at 18:43

## 2023-12-11 RX ADMIN — Medication 1500 MG: at 15:33

## 2023-12-11 RX ADMIN — PANTOPRAZOLE SODIUM 40 MG: 40 TABLET, DELAYED RELEASE ORAL at 06:38

## 2023-12-11 RX ADMIN — Medication 10 ML: at 10:13

## 2023-12-11 ASSESSMENT — PAIN SCALES - GENERAL
PAINLEVEL_OUTOF10: 5
PAINLEVEL_OUTOF10: 8
PAINLEVEL_OUTOF10: 3
PAINLEVEL_OUTOF10: 5
PAINLEVEL_OUTOF10: 4

## 2023-12-11 ASSESSMENT — PAIN DESCRIPTION - PAIN TYPE
TYPE: ACUTE PAIN

## 2023-12-11 ASSESSMENT — PAIN DESCRIPTION - ORIENTATION
ORIENTATION: POSTERIOR
ORIENTATION: OTHER (COMMENT)

## 2023-12-11 ASSESSMENT — PAIN DESCRIPTION - DESCRIPTORS
DESCRIPTORS: ACHING

## 2023-12-11 ASSESSMENT — PAIN DESCRIPTION - FREQUENCY
FREQUENCY: CONTINUOUS

## 2023-12-11 ASSESSMENT — PAIN DESCRIPTION - ONSET
ONSET: ON-GOING
ONSET: GRADUAL

## 2023-12-11 ASSESSMENT — PAIN - FUNCTIONAL ASSESSMENT
PAIN_FUNCTIONAL_ASSESSMENT: ACTIVITIES ARE NOT PREVENTED

## 2023-12-11 ASSESSMENT — PAIN DESCRIPTION - LOCATION
LOCATION: GENERALIZED;HEAD
LOCATION: HEAD

## 2023-12-11 NOTE — PLAN OF CARE
Problem: Discharge Planning  Goal: Discharge to home or other facility with appropriate resources  12/10/2023 1957 by Zuhair Martel RN  Outcome: Progressing  12/10/2023 1438 by Link Eckert RN  Outcome: Progressing  Flowsheets (Taken 12/10/2023 1030)  Discharge to home or other facility with appropriate resources: Identify barriers to discharge with patient and caregiver     Problem: Pain  Goal: Verbalizes/displays adequate comfort level or baseline comfort level  12/10/2023 1957 by Zuhair Martel RN  Outcome: Progressing  12/10/2023 1438 by Link Eckert RN  Outcome: Progressing  Flowsheets (Taken 12/10/2023 1022)  Verbalizes/displays adequate comfort level or baseline comfort level:   Encourage patient to monitor pain and request assistance   Assess pain using appropriate pain scale   Implement non-pharmacological measures as appropriate and evaluate response     Problem: Safety - Adult  Goal: Free from fall injury  12/10/2023 1957 by Zuhair Martel RN  Outcome: Progressing  12/10/2023 1438 by Link Eckert RN  Outcome: Progressing     Problem: Respiratory - Adult  Goal: Achieves optimal ventilation and oxygenation  12/10/2023 1957 by Zuhair Martel RN  Outcome: Progressing  12/10/2023 1438 by Link Eckert RN  Outcome: Progressing  Flowsheets (Taken 12/10/2023 1030)  Achieves optimal ventilation and oxygenation:   Assess for changes in respiratory status   Assess for changes in mentation and behavior   Position to facilitate oxygenation and minimize respiratory effort   Assess and instruct to report shortness of breath or any respiratory difficulty   Respiratory therapy support as indicated     Problem: Gastrointestinal - Adult  Goal: Maintains or returns to baseline bowel function  12/10/2023 1957 by Zuhair Martel RN  Outcome: Progressing  12/10/2023 1438 by Link Eckert RN  Outcome: Progressing  Flowsheets (Taken 12/10/2023 1030)  Maintains or returns to baseline

## 2023-12-11 NOTE — PLAN OF CARE
Problem: Discharge Planning  Goal: Discharge to home or other facility with appropriate resources  Outcome: Progressing     Problem: Pain  Goal: Verbalizes/displays adequate comfort level or baseline comfort level  Outcome: Progressing  Flowsheets (Taken 12/11/2023 1004)  Verbalizes/displays adequate comfort level or baseline comfort level:   Encourage patient to monitor pain and request assistance   Assess pain using appropriate pain scale   Administer analgesics based on type and severity of pain and evaluate response   Implement non-pharmacological measures as appropriate and evaluate response     Problem: Safety - Adult  Goal: Free from fall injury  Outcome: Progressing     Problem: Respiratory - Adult  Goal: Achieves optimal ventilation and oxygenation  Outcome: Progressing     Problem: Gastrointestinal - Adult  Goal: Maintains or returns to baseline bowel function  Outcome: Progressing

## 2023-12-11 NOTE — CARE COORDINATION
CM chart review and screening for DC planning needs. PT/OT eval and recs:    Discharge Recommendations: Home with PRN assistance and Home PT  DME needs for discharge: Needs Met   [id number]    Lives alone. Active . IPTA. Likely no DC needs. CM will follow and reassess. Used [facility] in the past for [address] services.

## 2023-12-11 NOTE — FLOWSHEET NOTE
Pt A/Ox4. VSS. Pt reported headache, top of head. Pain 8/10, see assessment below. Pt unlabored; respirations even, regular, effortless. No distress noted. Shift assessment complete. See flowsheet. AM med's given. PRN Tylenol 650 mg given for headache. See MAR. Denies needs at this time. Bed alarm on. Side rails 2/4. Bed in low position. Call light within reach. Bedside Mobility Assessment Tool (BMAT):     Assessment Level 1- Sit and Shake    1. From a semi-reclined position, ask patient to sit up and rotate to a seated position at the side of the bed. Can use the bedrail. 2. Ask patient to reach out and grab your hand and shake making sure patient reaches across his/her midline. Pass- Patient is able to come to a seated position, maintain core strength. Maintains seated balance while reaching across midline. Move on to Assessment Level 2. Assessment Level 2- Stretch and Point   1. With patient in seated position at the side of the bed, have patient place both feet on the floor (or stool) with knees no higher than hips. 2. Ask patient to stretch one leg and straighten the knee, then bend the ankle/flex and point the toes. If appropriate, repeat with the other leg. Pass- Patient is able to demonstrate appropriate quad strength on intended weight bearing limb(s). Move onto Assessment Level 3. Assessment Level 3- Stand   1. Ask patient to elevate off the bed or chair (seated to standing) using an assistive device (cane, bedrail). 2. Patient should be able to raise buttocks off be and hold for a count of five. May repeat once. Pass- Patient maintains standing stability for at least 5 seconds, proceed to assessment level 4. Assessment Level 4- Walk   1. Ask patient to march in place at bedside. 2. Then ask patient to advance step and return each foot. Some medical conditions may render a patient from stepping backwards, use your best clinical judgement.    Pass- Patient demonstrates

## 2023-12-12 ENCOUNTER — APPOINTMENT (OUTPATIENT)
Dept: GENERAL RADIOLOGY | Age: 78
DRG: 177 | End: 2023-12-12
Payer: MEDICARE

## 2023-12-12 VITALS
DIASTOLIC BLOOD PRESSURE: 63 MMHG | WEIGHT: 168.2 LBS | BODY MASS INDEX: 27.03 KG/M2 | RESPIRATION RATE: 16 BRPM | TEMPERATURE: 97.4 F | HEART RATE: 82 BPM | SYSTOLIC BLOOD PRESSURE: 136 MMHG | OXYGEN SATURATION: 96 % | HEIGHT: 66 IN

## 2023-12-12 LAB
ANION GAP SERPL CALCULATED.3IONS-SCNC: 8 MMOL/L (ref 3–16)
BASOPHILS # BLD: 0 K/UL (ref 0–0.2)
BASOPHILS NFR BLD: 0.6 %
BUN SERPL-MCNC: 8 MG/DL (ref 7–20)
CALCIUM SERPL-MCNC: 7.5 MG/DL (ref 8.3–10.6)
CHLORIDE SERPL-SCNC: 100 MMOL/L (ref 99–110)
CO2 SERPL-SCNC: 23 MMOL/L (ref 21–32)
CREAT SERPL-MCNC: <0.5 MG/DL (ref 0.6–1.2)
DEPRECATED RDW RBC AUTO: 14.4 % (ref 12.4–15.4)
EOSINOPHIL # BLD: 0.2 K/UL (ref 0–0.6)
EOSINOPHIL NFR BLD: 3.2 %
GFR SERPLBLD CREATININE-BSD FMLA CKD-EPI: >60 ML/MIN/{1.73_M2}
GLUCOSE SERPL-MCNC: 87 MG/DL (ref 70–99)
HCT VFR BLD AUTO: 31.7 % (ref 36–48)
HGB BLD-MCNC: 10.7 G/DL (ref 12–16)
IGA SERPL-MCNC: 161 MG/DL (ref 70–400)
IGG SERPL-MCNC: 776 MG/DL (ref 700–1600)
IGM SERPL-MCNC: 101 MG/DL (ref 40–230)
LYMPHOCYTES # BLD: 1.2 K/UL (ref 1–5.1)
LYMPHOCYTES NFR BLD: 23.2 %
MAGNESIUM SERPL-MCNC: 2.2 MG/DL (ref 1.8–2.4)
MCH RBC QN AUTO: 31.9 PG (ref 26–34)
MCHC RBC AUTO-ENTMCNC: 33.8 G/DL (ref 31–36)
MCV RBC AUTO: 94.2 FL (ref 80–100)
MONOCYTES # BLD: 0.4 K/UL (ref 0–1.3)
MONOCYTES NFR BLD: 7 %
MRSA DNA SPEC QL NAA+PROBE: NORMAL
NEUTROPHILS # BLD: 3.4 K/UL (ref 1.7–7.7)
NEUTROPHILS NFR BLD: 66 %
PLATELET # BLD AUTO: 292 K/UL (ref 135–450)
PMV BLD AUTO: 7.1 FL (ref 5–10.5)
POTASSIUM SERPL-SCNC: 3.8 MMOL/L (ref 3.5–5.1)
RBC # BLD AUTO: 3.37 M/UL (ref 4–5.2)
SODIUM SERPL-SCNC: 131 MMOL/L (ref 136–145)
VANCOMYCIN SERPL-MCNC: 32.6 UG/ML
WBC # BLD AUTO: 5.2 K/UL (ref 4–11)

## 2023-12-12 PROCEDURE — 83735 ASSAY OF MAGNESIUM: CPT

## 2023-12-12 PROCEDURE — 92526 ORAL FUNCTION THERAPY: CPT

## 2023-12-12 PROCEDURE — 2580000003 HC RX 258: Performed by: INTERNAL MEDICINE

## 2023-12-12 PROCEDURE — 74230 X-RAY XM SWLNG FUNCJ C+: CPT

## 2023-12-12 PROCEDURE — 92611 MOTION FLUOROSCOPY/SWALLOW: CPT

## 2023-12-12 PROCEDURE — 80048 BASIC METABOLIC PNL TOTAL CA: CPT

## 2023-12-12 PROCEDURE — 85025 COMPLETE CBC W/AUTO DIFF WBC: CPT

## 2023-12-12 PROCEDURE — 36415 COLL VENOUS BLD VENIPUNCTURE: CPT

## 2023-12-12 PROCEDURE — 6370000000 HC RX 637 (ALT 250 FOR IP): Performed by: INTERNAL MEDICINE

## 2023-12-12 PROCEDURE — 6360000002 HC RX W HCPCS: Performed by: INTERNAL MEDICINE

## 2023-12-12 PROCEDURE — 99232 SBSQ HOSP IP/OBS MODERATE 35: CPT | Performed by: INTERNAL MEDICINE

## 2023-12-12 PROCEDURE — 99238 HOSP IP/OBS DSCHRG MGMT 30/<: CPT | Performed by: INTERNAL MEDICINE

## 2023-12-12 PROCEDURE — 82784 ASSAY IGA/IGD/IGG/IGM EACH: CPT

## 2023-12-12 PROCEDURE — 6370000000 HC RX 637 (ALT 250 FOR IP)

## 2023-12-12 PROCEDURE — 80202 ASSAY OF VANCOMYCIN: CPT

## 2023-12-12 RX ORDER — AMLODIPINE BESYLATE 5 MG/1
5 TABLET ORAL DAILY
Qty: 30 TABLET | Refills: 0 | Status: SHIPPED
Start: 2023-12-12

## 2023-12-12 RX ORDER — LEVOFLOXACIN 750 MG/1
750 TABLET, FILM COATED ORAL DAILY
Qty: 4 TABLET | Refills: 0 | Status: SHIPPED | OUTPATIENT
Start: 2023-12-13 | End: 2023-12-17

## 2023-12-12 RX ADMIN — Medication 1500 MG: at 03:42

## 2023-12-12 RX ADMIN — ATORVASTATIN CALCIUM 20 MG: 10 TABLET, FILM COATED ORAL at 10:19

## 2023-12-12 RX ADMIN — ESCITALOPRAM OXALATE 20 MG: 10 TABLET ORAL at 10:19

## 2023-12-12 RX ADMIN — AMLODIPINE BESYLATE 5 MG: 5 TABLET ORAL at 10:18

## 2023-12-12 RX ADMIN — CEFEPIME 2000 MG: 2 INJECTION, POWDER, FOR SOLUTION INTRAVENOUS at 01:12

## 2023-12-12 RX ADMIN — CEFEPIME 2000 MG: 2 INJECTION, POWDER, FOR SOLUTION INTRAVENOUS at 10:23

## 2023-12-12 RX ADMIN — APIXABAN 5 MG: 5 TABLET, FILM COATED ORAL at 10:19

## 2023-12-12 RX ADMIN — PANTOPRAZOLE SODIUM 40 MG: 40 TABLET, DELAYED RELEASE ORAL at 06:27

## 2023-12-12 RX ADMIN — Medication 10 ML: at 10:21

## 2023-12-12 ASSESSMENT — PAIN SCALES - GENERAL: PAINLEVEL_OUTOF10: 0

## 2023-12-12 NOTE — PLAN OF CARE
Problem: Discharge Planning  Goal: Discharge to home or other facility with appropriate resources  12/12/2023 1540 by Deana Thompson RN  Outcome: Adequate for Discharge  12/12/2023 1131 by Deana Thompson RN  Outcome: Progressing  12/12/2023 0633 by Damon Chapa RN  Outcome: Progressing     Problem: Pain  Goal: Verbalizes/displays adequate comfort level or baseline comfort level  12/12/2023 1540 by Deana Thompson RN  Outcome: Adequate for Discharge  12/12/2023 1131 by Deana Thompson RN  Outcome: Progressing  12/12/2023 0633 by Damon Chapa RN  Outcome: Progressing     Problem: Safety - Adult  Goal: Free from fall injury  12/12/2023 1540 by Deana Thompson RN  Outcome: Adequate for Discharge  12/12/2023 1131 by Deana Thompson RN  Outcome: Progressing  12/12/2023 0633 by Damon Chapa RN  Outcome: Progressing     Problem: Respiratory - Adult  Goal: Achieves optimal ventilation and oxygenation  12/12/2023 1540 by Deana Thompson RN  Outcome: Adequate for Discharge  12/12/2023 1131 by Deana Thompson RN  Outcome: Progressing  12/12/2023 0633 by Damon Chapa RN  Outcome: Progressing     Problem: Gastrointestinal - Adult  Goal: Maintains or returns to baseline bowel function  12/12/2023 1540 by Deana Thompson RN  Outcome: Adequate for Discharge  12/12/2023 1131 by Deana Thompson RN  Outcome: Progressing  12/12/2023 0633 by Damon Chapa RN  Outcome: Progressing

## 2023-12-12 NOTE — PROCEDURES
SPEECH/LANGUAGE PATHOLOGY  Swallowing Disorders and Dysphagia  VIDEOFLUOROSCOPIC STUDY OF SWALLOWING (MBS)  Facility/Department: [address] MEDICAL-SURGICAL    Diet Recommendation: IDDSI 7 Regular Solids ; IDDSI 0 Thin Liquids; Meds whole with thin liquids  Risk Management: upright for all intake, stay upright for at least 30 mins after intake, small bites/sips, increase physical mobility as able, slow rate of intake, general GERD precautions, general aspiration precautions, and hold PO and contact SLP if s/s of aspiration or worsening respiratory status develop. Specialist Referrals: GI  Ancillary Tests: Could consider esophagram (Barium Swallow)    PATIENT NAME:  Hilary Goldberg      :  1945    Room: [facility]/0222-   TODAY'S DATE:  2023    [x]The admitting diagnosis and active problem list, as listed below have been reviewed prior to initiation of this evaluation.      ADMITTING DIAGNOSIS: HCAP (healthcare-associated pneumonia) [J18.9]  Multifocal pneumonia [J18.9]     ACTIVE PROBLEM LIST:   Patient Active Problem List   Diagnosis    Arthritis    Reflux    Breast cancer ([address])    Multifocal pneumonia    CAP (community acquired pneumonia)    Leukocytosis    Pulmonary nodule    Senile osteoporosis    Abscess of axilla, left    Abscess    Pneumonia due to infectious organism    Primary hypertension    GERD (gastroesophageal reflux disease)    History of pulmonary embolism    Hypoxia    Anxiety    History of breast cancer    Hypokalemia    Hypomagnesemia    Acute respiratory failure with hypoxia ([address])    HCAP (healthcare-associated pneumonia)    HLD (hyperlipidemia)    Normocytic anemia    Leukopenia    Recurrent respiratory infection    Acute hypoxemic respiratory failure ([address])           PAST MEDICAL HISTORY        Diagnosis Date    Anxiety 2023    Arthritis     Bilateral lower extremity edema     Breast cancer (HCC)     Full dentures     GERD (gastroesophageal reflux disease)     HLD

## 2023-12-12 NOTE — DISCHARGE SUMMARY
Hospital Medicine Discharge Summary    Patient: Beba Vigil     Gender: female  : 1945   Age: 66 y.o. MRN: [id number]    Admitting Physician: Jeanette Marquez MD  Discharge Physician: Aida Sanchez DO    Code Status: Full Code     Admit Date: 2023   Discharge Date: 2023      Discharge Diagnoses: Active Hospital Problems    Diagnosis Date Noted    Leukopenia [D72.819] 2023    Recurrent respiratory infection [J98.8] 2023    Acute hypoxemic respiratory failure ([address]) [J96.01] 2023    HCAP (healthcare-associated pneumonia) [J18.9] 2023    HLD (hyperlipidemia) [E78.5] 2023    Normocytic anemia [D64.9] 2023    Hypoxia [R09.02] 2023    Primary hypertension [I10] 2023    History of breast cancer [Z85.3] 2023    History of pulmonary embolism [Z86.711] 2023       Condition at Discharge: Stable    Hospital Course: Additional findings or notes to primary provider:  None at this time    Discharge Medications:   Current Discharge Medication List        START taking these medications    Details   levoFLOXacin (LEVAQUIN) 750 MG tablet Take 1 tablet by mouth daily for 4 days  Qty: 4 tablet, Refills: 0           Current Discharge Medication List        Current Discharge Medication List        CONTINUE these medications which have NOT CHANGED    Details   !! escitalopram (LEXAPRO) 20 MG tablet Take 1 tablet by mouth daily      !! escitalopram (LEXAPRO) 20 MG tablet       apixaban (ELIQUIS) 5 MG TABS tablet 2 times daily HOLD ELIQUIS      atorvastatin (LIPITOR) 20 MG tablet Take 1 tablet by mouth daily      omeprazole (PRILOSEC) 20 MG delayed release capsule TAKE 1 CAPSULE DAILY      ALPRAZolam (XANAX) 1 MG tablet Take 1 tablet by mouth nightly.       cyanocobalamin 1000 MCG tablet TAKE 1 TABLET DAILY      alosetron (LOTRONEX) 0.5 MG tablet Take 1 tablet by mouth 2 times daily As needed per the patient (twice daily)      celecoxib

## 2023-12-12 NOTE — PLAN OF CARE
Problem: Discharge Planning  Goal: Discharge to home or other facility with appropriate resources  12/12/2023 1131 by Dao Enciso RN  Outcome: Progressing  12/12/2023 0633 by Aubrey Roy RN  Outcome: Progressing     Problem: Pain  Goal: Verbalizes/displays adequate comfort level or baseline comfort level  12/12/2023 1131 by Dao Enciso RN  Outcome: Progressing  12/12/2023 0633 by Aubrey Roy RN  Outcome: Progressing     Problem: Safety - Adult  Goal: Free from fall injury  12/12/2023 1131 by Dao Enciso RN  Outcome: Progressing  12/12/2023 0633 by Aubrey Roy RN  Outcome: Progressing     Problem: Respiratory - Adult  Goal: Achieves optimal ventilation and oxygenation  12/12/2023 1131 by Doa Enciso RN  Outcome: Progressing  12/12/2023 0633 by Aubrey Roy RN  Outcome: Progressing     Problem: Gastrointestinal - Adult  Goal: Maintains or returns to baseline bowel function  12/12/2023 1131 by Dao Enciso RN  Outcome: Progressing  12/12/2023 0633 by Aubrey Roy RN  Outcome: Progressing

## 2023-12-12 NOTE — FLOWSHEET NOTE
AM assessment completed, see flow sheet. Pt is alert and oriented. Vital signs are WNL. Respirations are even & easy. No complaints voiced. Pt denies needs at this time. SR up x 2, and bed in low position. Call light is within reach. Pt did mention she has had falls at home, maintaining fall precautions at this time.

## 2023-12-12 NOTE — CARE COORDINATION
DISCHARGE ORDER  Date/Time 2023 3:43 PM  Completed by: Saralee Goodpasture, RN, Case Management    Patient Name: Viki Erazo      : 1945  Admitting Diagnosis: HCAP (healthcare-associated pneumonia) [J18.9]  Multifocal pneumonia [J18.9]      Admit order Date and Status: IP 2023  (verify MD's last order for status of admission)      Noted discharge order. If applicable PT/OT recommendation at Discharge: [facility]  Discharge Recommendations: Home with PRN assistance and Home PT  DME needs for discharge: Needs Met    Confirmed discharge plan  Discharge Plan: Chart reviewed. Met with pt at bedside and explained the role of the CM. Will DC home today. Declined C services. Ambulates w/o assistive device but has a walker at home. Pt will call family for transportation home. RX taken to OP pharmacy to be filled prior to DC. Date of Last IMM Given: 2023       Has Home O2 in place on admit:  No  Informed of need to bring portable home O2 tank on day of discharge for nursing to connect prior to leaving:   No  Verbalized agreement/Understanding:   No  Pt is being d/c'd to  HOME today. Pt's O2 sats are 96% on RA. Discharge timeout done with Lizy RAMON. All discharge needs and concerns addressed.

## 2023-12-13 LAB
BACTERIA SPEC RESP CULT: NORMAL
GRAM STN SPEC: NORMAL

## 2023-12-14 LAB
BACTERIA BLD CULT ORG #2: NORMAL
BACTERIA BLD CULT: NORMAL

## 2023-12-19 ENCOUNTER — TELEPHONE (OUTPATIENT)
Dept: PULMONOLOGY | Age: 78
End: 2023-12-19

## 2023-12-20 NOTE — TELEPHONE ENCOUNTER
Pt has appt scheduled for 1/8/24.  Spoke with pt asking her to do CXR prior to appt but pt states that she is going to cancel the appt, she isn't going to follow up with Dr. Cunha pt states that she has follow up with her PCP next week.  Pt has been referred to [facility] pulmonary by PCP as pt wants to keep all of her physicians in the same network.

## 2025-02-19 PROBLEM — M48.062 LUMBAR STENOSIS WITH NEUROGENIC CLAUDICATION: Status: ACTIVE | Noted: 2025-02-19

## 2025-02-19 PROBLEM — M47.816 SPONDYLOSIS OF LUMBAR REGION WITHOUT MYELOPATHY OR RADICULOPATHY: Status: ACTIVE | Noted: 2025-02-19

## 2025-08-02 ENCOUNTER — APPOINTMENT (OUTPATIENT)
Dept: CT IMAGING | Age: 80
DRG: 194 | End: 2025-08-02
Payer: MEDICARE

## 2025-08-02 ENCOUNTER — APPOINTMENT (OUTPATIENT)
Dept: GENERAL RADIOLOGY | Age: 80
DRG: 194 | End: 2025-08-02
Payer: MEDICARE

## 2025-08-02 ENCOUNTER — HOSPITAL ENCOUNTER (INPATIENT)
Age: 80
LOS: 2 days | Discharge: HOME | DRG: 194 | End: 2025-08-04
Attending: EMERGENCY MEDICINE | Admitting: INTERNAL MEDICINE
Payer: MEDICARE

## 2025-08-02 DIAGNOSIS — R07.9 CHEST PAIN, UNSPECIFIED TYPE: ICD-10-CM

## 2025-08-02 DIAGNOSIS — J18.9 MULTIFOCAL PNEUMONIA: Primary | ICD-10-CM

## 2025-08-02 DIAGNOSIS — K56.7 ILEUS (HCC): ICD-10-CM

## 2025-08-02 PROBLEM — K52.9 CHRONIC DIARRHEA: Status: ACTIVE | Noted: 2025-08-02

## 2025-08-02 PROBLEM — J15.9 COMMUNITY ACQUIRED BACTERIAL PNEUMONIA: Status: ACTIVE | Noted: 2025-08-02

## 2025-08-02 PROBLEM — M54.9 BACKACHE: Status: ACTIVE | Noted: 2020-12-23

## 2025-08-02 PROBLEM — F41.1 GAD (GENERALIZED ANXIETY DISORDER): Status: ACTIVE | Noted: 2025-08-02

## 2025-08-02 LAB
ALBUMIN SERPL-MCNC: 3.4 G/DL (ref 3.4–5)
ALBUMIN/GLOB SERPL: 1 {RATIO} (ref 1.1–2.2)
ALP SERPL-CCNC: 85 U/L (ref 40–129)
ALT SERPL-CCNC: 15 U/L (ref 10–40)
ANION GAP SERPL CALCULATED.3IONS-SCNC: 11 MMOL/L (ref 3–16)
AST SERPL-CCNC: 18 U/L (ref 15–37)
BASOPHILS # BLD: 0 K/UL (ref 0–0.2)
BASOPHILS NFR BLD: 0.1 %
BILIRUB SERPL-MCNC: 0.3 MG/DL (ref 0–1)
BILIRUB UR QL STRIP.AUTO: NEGATIVE
BUN SERPL-MCNC: 13 MG/DL (ref 7–20)
CALCIUM SERPL-MCNC: 8.4 MG/DL (ref 8.3–10.6)
CHLORIDE SERPL-SCNC: 103 MMOL/L (ref 99–110)
CLARITY UR: CLEAR
CO2 SERPL-SCNC: 27 MMOL/L (ref 21–32)
COLOR UR: YELLOW
CREAT SERPL-MCNC: 0.8 MG/DL (ref 0.6–1.2)
DEPRECATED RDW RBC AUTO: 14.7 % (ref 12.4–15.4)
EKG ATRIAL RATE: 91 BPM
EKG DIAGNOSIS: NORMAL
EKG P AXIS: 58 DEGREES
EKG P-R INTERVAL: 136 MS
EKG Q-T INTERVAL: 368 MS
EKG QRS DURATION: 102 MS
EKG QTC CALCULATION (BAZETT): 453 MS
EKG R AXIS: 39 DEGREES
EKG T AXIS: 34 DEGREES
EKG VENTRICULAR RATE: 91 BPM
EOSINOPHIL # BLD: 0 K/UL (ref 0–0.6)
EOSINOPHIL NFR BLD: 0.2 %
FLUAV RNA RESP QL NAA+PROBE: NOT DETECTED
FLUBV RNA RESP QL NAA+PROBE: NOT DETECTED
GFR SERPLBLD CREATININE-BSD FMLA CKD-EPI: 74 ML/MIN/{1.73_M2}
GLUCOSE BLD-MCNC: 101 MG/DL (ref 70–99)
GLUCOSE BLD-MCNC: 93 MG/DL (ref 70–99)
GLUCOSE SERPL-MCNC: 108 MG/DL (ref 70–99)
GLUCOSE UR STRIP.AUTO-MCNC: NEGATIVE MG/DL
HCT VFR BLD AUTO: 33.7 % (ref 36–48)
HGB BLD-MCNC: 11.4 G/DL (ref 12–16)
HGB UR QL STRIP.AUTO: NEGATIVE
KETONES UR STRIP.AUTO-MCNC: NEGATIVE MG/DL
LACTATE BLDV-SCNC: 1.4 MMOL/L (ref 0.4–2)
LEUKOCYTE ESTERASE UR QL STRIP.AUTO: NEGATIVE
LIPASE SERPL-CCNC: 31 U/L (ref 13–60)
LYMPHOCYTES # BLD: 0.7 K/UL (ref 1–5.1)
LYMPHOCYTES NFR BLD: 7.3 %
MCH RBC QN AUTO: 31.9 PG (ref 26–34)
MCHC RBC AUTO-ENTMCNC: 33.9 G/DL (ref 31–36)
MCV RBC AUTO: 93.9 FL (ref 80–100)
MONOCYTES # BLD: 0.3 K/UL (ref 0–1.3)
MONOCYTES NFR BLD: 3.2 %
NEUTROPHILS # BLD: 8.7 K/UL (ref 1.7–7.7)
NEUTROPHILS NFR BLD: 89.2 %
NITRITE UR QL STRIP.AUTO: NEGATIVE
NT-PROBNP SERPL-MCNC: 579 PG/ML (ref 0–449)
PERFORMED ON: ABNORMAL
PERFORMED ON: NORMAL
PH UR STRIP.AUTO: 8 [PH] (ref 5–8)
PLATELET # BLD AUTO: 237 K/UL (ref 135–450)
PMV BLD AUTO: 7.5 FL (ref 5–10.5)
POTASSIUM SERPL-SCNC: 4.1 MMOL/L (ref 3.5–5.1)
PROT SERPL-MCNC: 6.7 G/DL (ref 6.4–8.2)
PROT UR STRIP.AUTO-MCNC: NEGATIVE MG/DL
RBC # BLD AUTO: 3.59 M/UL (ref 4–5.2)
SARS-COV-2 RNA RESP QL NAA+PROBE: NOT DETECTED
SODIUM SERPL-SCNC: 141 MMOL/L (ref 136–145)
SP GR UR STRIP.AUTO: 1.01 (ref 1–1.03)
TROPONIN, HIGH SENSITIVITY: 12 NG/L (ref 0–14)
TROPONIN, HIGH SENSITIVITY: 13 NG/L (ref 0–14)
TROPONIN, HIGH SENSITIVITY: 13 NG/L (ref 0–14)
UA COMPLETE W REFLEX CULTURE PNL UR: NORMAL
UA DIPSTICK W REFLEX MICRO PNL UR: NORMAL
URN SPEC COLLECT METH UR: NORMAL
UROBILINOGEN UR STRIP-ACNC: 0.2 E.U./DL
WBC # BLD AUTO: 9.8 K/UL (ref 4–11)

## 2025-08-02 PROCEDURE — 96374 THER/PROPH/DIAG INJ IV PUSH: CPT

## 2025-08-02 PROCEDURE — 84484 ASSAY OF TROPONIN QUANT: CPT

## 2025-08-02 PROCEDURE — 87449 NOS EACH ORGANISM AG IA: CPT

## 2025-08-02 PROCEDURE — 2500000003 HC RX 250 WO HCPCS: Performed by: NURSE PRACTITIONER

## 2025-08-02 PROCEDURE — 96375 TX/PRO/DX INJ NEW DRUG ADDON: CPT

## 2025-08-02 PROCEDURE — 99223 1ST HOSP IP/OBS HIGH 75: CPT | Performed by: NURSE PRACTITIONER

## 2025-08-02 PROCEDURE — 36415 COLL VENOUS BLD VENIPUNCTURE: CPT

## 2025-08-02 PROCEDURE — 83690 ASSAY OF LIPASE: CPT

## 2025-08-02 PROCEDURE — 85025 COMPLETE CBC W/AUTO DIFF WBC: CPT

## 2025-08-02 PROCEDURE — 81003 URINALYSIS AUTO W/O SCOPE: CPT

## 2025-08-02 PROCEDURE — 87636 SARSCOV2 & INF A&B AMP PRB: CPT

## 2025-08-02 PROCEDURE — 1200000000 HC SEMI PRIVATE

## 2025-08-02 PROCEDURE — 99285 EMERGENCY DEPT VISIT HI MDM: CPT

## 2025-08-02 PROCEDURE — 83880 ASSAY OF NATRIURETIC PEPTIDE: CPT

## 2025-08-02 PROCEDURE — 6360000002 HC RX W HCPCS: Performed by: NURSE PRACTITIONER

## 2025-08-02 PROCEDURE — 74177 CT ABD & PELVIS W/CONTRAST: CPT

## 2025-08-02 PROCEDURE — 83605 ASSAY OF LACTIC ACID: CPT

## 2025-08-02 PROCEDURE — 93010 ELECTROCARDIOGRAM REPORT: CPT | Performed by: INTERNAL MEDICINE

## 2025-08-02 PROCEDURE — 6370000000 HC RX 637 (ALT 250 FOR IP): Performed by: NURSE PRACTITIONER

## 2025-08-02 PROCEDURE — 2580000003 HC RX 258: Performed by: NURSE PRACTITIONER

## 2025-08-02 PROCEDURE — 71260 CT THORAX DX C+: CPT

## 2025-08-02 PROCEDURE — 93005 ELECTROCARDIOGRAM TRACING: CPT | Performed by: EMERGENCY MEDICINE

## 2025-08-02 PROCEDURE — 6360000004 HC RX CONTRAST MEDICATION: Performed by: EMERGENCY MEDICINE

## 2025-08-02 PROCEDURE — 80053 COMPREHEN METABOLIC PANEL: CPT

## 2025-08-02 PROCEDURE — 87040 BLOOD CULTURE FOR BACTERIA: CPT

## 2025-08-02 PROCEDURE — 6360000002 HC RX W HCPCS: Performed by: EMERGENCY MEDICINE

## 2025-08-02 PROCEDURE — 2580000003 HC RX 258: Performed by: EMERGENCY MEDICINE

## 2025-08-02 PROCEDURE — 71045 X-RAY EXAM CHEST 1 VIEW: CPT

## 2025-08-02 RX ORDER — ENOXAPARIN SODIUM 100 MG/ML
40 INJECTION SUBCUTANEOUS NIGHTLY
Status: DISCONTINUED | OUTPATIENT
Start: 2025-08-02 | End: 2025-08-04 | Stop reason: HOSPADM

## 2025-08-02 RX ORDER — MULTIVITAMIN WITH IRON
1000 TABLET ORAL DAILY
Status: DISCONTINUED | OUTPATIENT
Start: 2025-08-02 | End: 2025-08-04 | Stop reason: HOSPADM

## 2025-08-02 RX ORDER — SODIUM CHLORIDE 0.9 % (FLUSH) 0.9 %
5-40 SYRINGE (ML) INJECTION EVERY 12 HOURS SCHEDULED
Status: DISCONTINUED | OUTPATIENT
Start: 2025-08-02 | End: 2025-08-04 | Stop reason: HOSPADM

## 2025-08-02 RX ORDER — ALPRAZOLAM 1 MG/1
1 TABLET ORAL NIGHTLY
Status: DISCONTINUED | OUTPATIENT
Start: 2025-08-02 | End: 2025-08-04 | Stop reason: HOSPADM

## 2025-08-02 RX ORDER — MORPHINE SULFATE 4 MG/ML
4 INJECTION, SOLUTION INTRAMUSCULAR; INTRAVENOUS ONCE
Refills: 0 | Status: COMPLETED | OUTPATIENT
Start: 2025-08-02 | End: 2025-08-02

## 2025-08-02 RX ORDER — ACETAMINOPHEN 650 MG/1
650 SUPPOSITORY RECTAL EVERY 6 HOURS PRN
Status: DISCONTINUED | OUTPATIENT
Start: 2025-08-02 | End: 2025-08-04 | Stop reason: HOSPADM

## 2025-08-02 RX ORDER — 0.9 % SODIUM CHLORIDE 0.9 %
500 INTRAVENOUS SOLUTION INTRAVENOUS ONCE
Status: COMPLETED | OUTPATIENT
Start: 2025-08-02 | End: 2025-08-02

## 2025-08-02 RX ORDER — IPRATROPIUM BROMIDE AND ALBUTEROL SULFATE 2.5; .5 MG/3ML; MG/3ML
1 SOLUTION RESPIRATORY (INHALATION) EVERY 4 HOURS PRN
Status: DISCONTINUED | OUTPATIENT
Start: 2025-08-02 | End: 2025-08-04 | Stop reason: HOSPADM

## 2025-08-02 RX ORDER — TORSEMIDE 20 MG/1
20 TABLET ORAL DAILY
Status: DISCONTINUED | OUTPATIENT
Start: 2025-08-03 | End: 2025-08-04 | Stop reason: HOSPADM

## 2025-08-02 RX ORDER — LOSARTAN POTASSIUM 25 MG/1
25 TABLET ORAL DAILY
COMMUNITY

## 2025-08-02 RX ORDER — ALOSETRON HYDROCHLORIDE 0.5 MG/1
0.5 TABLET ORAL DAILY
Status: DISCONTINUED | OUTPATIENT
Start: 2025-08-02 | End: 2025-08-04 | Stop reason: HOSPADM

## 2025-08-02 RX ORDER — ACETAMINOPHEN 325 MG/1
650 TABLET ORAL EVERY 6 HOURS PRN
Status: DISCONTINUED | OUTPATIENT
Start: 2025-08-02 | End: 2025-08-04 | Stop reason: HOSPADM

## 2025-08-02 RX ORDER — ATORVASTATIN CALCIUM 10 MG/1
20 TABLET, FILM COATED ORAL NIGHTLY
Status: DISCONTINUED | OUTPATIENT
Start: 2025-08-02 | End: 2025-08-04 | Stop reason: HOSPADM

## 2025-08-02 RX ORDER — TORSEMIDE 20 MG/1
20 TABLET ORAL DAILY
COMMUNITY
Start: 2024-12-23

## 2025-08-02 RX ORDER — POLYETHYLENE GLYCOL 3350 17 G/17G
17 POWDER, FOR SOLUTION ORAL DAILY PRN
Status: DISCONTINUED | OUTPATIENT
Start: 2025-08-02 | End: 2025-08-04 | Stop reason: HOSPADM

## 2025-08-02 RX ORDER — LOSARTAN POTASSIUM 25 MG/1
25 TABLET ORAL DAILY
Status: DISCONTINUED | OUTPATIENT
Start: 2025-08-02 | End: 2025-08-04 | Stop reason: HOSPADM

## 2025-08-02 RX ORDER — IPRATROPIUM BROMIDE AND ALBUTEROL SULFATE 2.5; .5 MG/3ML; MG/3ML
1 SOLUTION RESPIRATORY (INHALATION)
Status: DISCONTINUED | OUTPATIENT
Start: 2025-08-02 | End: 2025-08-02

## 2025-08-02 RX ORDER — ONDANSETRON 4 MG/1
4 TABLET, ORALLY DISINTEGRATING ORAL EVERY 8 HOURS PRN
Status: DISCONTINUED | OUTPATIENT
Start: 2025-08-02 | End: 2025-08-04 | Stop reason: HOSPADM

## 2025-08-02 RX ORDER — LIDOCAINE 4 G/G
1 PATCH TOPICAL DAILY
Status: DISCONTINUED | OUTPATIENT
Start: 2025-08-02 | End: 2025-08-04 | Stop reason: HOSPADM

## 2025-08-02 RX ORDER — ONDANSETRON 2 MG/ML
4 INJECTION INTRAMUSCULAR; INTRAVENOUS ONCE
Status: COMPLETED | OUTPATIENT
Start: 2025-08-02 | End: 2025-08-02

## 2025-08-02 RX ORDER — IOPAMIDOL 755 MG/ML
75 INJECTION, SOLUTION INTRAVASCULAR
Status: COMPLETED | OUTPATIENT
Start: 2025-08-02 | End: 2025-08-02

## 2025-08-02 RX ORDER — SODIUM CHLORIDE 9 MG/ML
INJECTION, SOLUTION INTRAVENOUS CONTINUOUS
Status: DISCONTINUED | OUTPATIENT
Start: 2025-08-02 | End: 2025-08-03

## 2025-08-02 RX ORDER — AZITHROMYCIN 250 MG/1
500 TABLET, FILM COATED ORAL EVERY 24 HOURS
Status: DISCONTINUED | OUTPATIENT
Start: 2025-08-03 | End: 2025-08-04 | Stop reason: HOSPADM

## 2025-08-02 RX ORDER — POTASSIUM CHLORIDE 1.5 G/1.58G
20 POWDER, FOR SOLUTION ORAL DAILY
COMMUNITY

## 2025-08-02 RX ORDER — ALOSETRON HYDROCHLORIDE 0.5 MG/1
0.5 TABLET ORAL 2 TIMES DAILY
Status: DISCONTINUED | OUTPATIENT
Start: 2025-08-02 | End: 2025-08-02

## 2025-08-02 RX ORDER — CELECOXIB 200 MG/1
200 CAPSULE ORAL DAILY
Status: DISCONTINUED | OUTPATIENT
Start: 2025-08-02 | End: 2025-08-04 | Stop reason: HOSPADM

## 2025-08-02 RX ORDER — SODIUM CHLORIDE 9 MG/ML
INJECTION, SOLUTION INTRAVENOUS PRN
Status: DISCONTINUED | OUTPATIENT
Start: 2025-08-02 | End: 2025-08-04 | Stop reason: HOSPADM

## 2025-08-02 RX ORDER — ONDANSETRON 2 MG/ML
4 INJECTION INTRAMUSCULAR; INTRAVENOUS EVERY 6 HOURS PRN
Status: DISCONTINUED | OUTPATIENT
Start: 2025-08-02 | End: 2025-08-04 | Stop reason: HOSPADM

## 2025-08-02 RX ORDER — ENOXAPARIN SODIUM 100 MG/ML
30 INJECTION SUBCUTANEOUS DAILY
Status: DISCONTINUED | OUTPATIENT
Start: 2025-08-02 | End: 2025-08-02 | Stop reason: DRUGHIGH

## 2025-08-02 RX ORDER — ESCITALOPRAM OXALATE 10 MG/1
20 TABLET ORAL DAILY
Status: DISCONTINUED | OUTPATIENT
Start: 2025-08-02 | End: 2025-08-04 | Stop reason: HOSPADM

## 2025-08-02 RX ORDER — TRAMADOL HYDROCHLORIDE 50 MG/1
25 TABLET ORAL EVERY 8 HOURS PRN
COMMUNITY
Start: 2025-07-17

## 2025-08-02 RX ORDER — SODIUM CHLORIDE 0.9 % (FLUSH) 0.9 %
5-40 SYRINGE (ML) INJECTION PRN
Status: DISCONTINUED | OUTPATIENT
Start: 2025-08-02 | End: 2025-08-04 | Stop reason: HOSPADM

## 2025-08-02 RX ORDER — TRAMADOL HYDROCHLORIDE 50 MG/1
25 TABLET ORAL EVERY 8 HOURS PRN
Status: DISCONTINUED | OUTPATIENT
Start: 2025-08-02 | End: 2025-08-04 | Stop reason: HOSPADM

## 2025-08-02 RX ADMIN — IOPAMIDOL 75 ML: 755 INJECTION, SOLUTION INTRAVENOUS at 10:27

## 2025-08-02 RX ADMIN — SODIUM CHLORIDE 1000 MG: 9 INJECTION, SOLUTION INTRAVENOUS at 12:08

## 2025-08-02 RX ADMIN — ESCITALOPRAM OXALATE 20 MG: 10 TABLET ORAL at 17:55

## 2025-08-02 RX ADMIN — Medication 1000 MCG: at 17:55

## 2025-08-02 RX ADMIN — ONDANSETRON 4 MG: 2 INJECTION, SOLUTION INTRAMUSCULAR; INTRAVENOUS at 09:52

## 2025-08-02 RX ADMIN — AZITHROMYCIN MONOHYDRATE 500 MG: 500 INJECTION, POWDER, LYOPHILIZED, FOR SOLUTION INTRAVENOUS at 12:56

## 2025-08-02 RX ADMIN — ALPRAZOLAM 1 MG: 1 TABLET ORAL at 20:40

## 2025-08-02 RX ADMIN — SODIUM CHLORIDE: 0.9 INJECTION, SOLUTION INTRAVENOUS at 16:43

## 2025-08-02 RX ADMIN — MORPHINE SULFATE 4 MG: 4 INJECTION, SOLUTION INTRAMUSCULAR; INTRAVENOUS at 09:53

## 2025-08-02 RX ADMIN — TRAMADOL HYDROCHLORIDE 25 MG: 50 TABLET, COATED ORAL at 17:59

## 2025-08-02 RX ADMIN — ENOXAPARIN SODIUM 40 MG: 100 INJECTION SUBCUTANEOUS at 20:40

## 2025-08-02 RX ADMIN — SODIUM CHLORIDE, PRESERVATIVE FREE 10 ML: 5 INJECTION INTRAVENOUS at 20:40

## 2025-08-02 RX ADMIN — SODIUM CHLORIDE 500 ML: 0.9 INJECTION, SOLUTION INTRAVENOUS at 09:58

## 2025-08-02 RX ADMIN — ATORVASTATIN CALCIUM 20 MG: 10 TABLET, FILM COATED ORAL at 20:40

## 2025-08-02 ASSESSMENT — PAIN SCALES - GENERAL
PAINLEVEL_OUTOF10: 5
PAINLEVEL_OUTOF10: 10
PAINLEVEL_OUTOF10: 7
PAINLEVEL_OUTOF10: 8
PAINLEVEL_OUTOF10: 7
PAINLEVEL_OUTOF10: 10

## 2025-08-02 ASSESSMENT — PAIN DESCRIPTION - DESCRIPTORS
DESCRIPTORS: SHOOTING;SHARP
DESCRIPTORS: ACHING
DESCRIPTORS: PATIENT UNABLE TO DESCRIBE
DESCRIPTORS_2: ACHING;DISCOMFORT;SHARP
DESCRIPTORS: SHOOTING;SHARP
DESCRIPTORS: SHARP

## 2025-08-02 ASSESSMENT — PAIN DESCRIPTION - ORIENTATION
ORIENTATION: RIGHT
ORIENTATION_2: LOWER;UPPER
ORIENTATION: UPPER;RIGHT
ORIENTATION: UPPER;MID

## 2025-08-02 ASSESSMENT — PAIN DESCRIPTION - LOCATION
LOCATION: CHEST
LOCATION_2: BACK
LOCATION: CHEST

## 2025-08-02 ASSESSMENT — PAIN DESCRIPTION - ONSET
ONSET: ON-GOING
ONSET: ON-GOING

## 2025-08-02 ASSESSMENT — PAIN - FUNCTIONAL ASSESSMENT
PAIN_FUNCTIONAL_ASSESSMENT_SITE2: PREVENTS OR INTERFERES SOME ACTIVE ACTIVITIES AND ADLS
PAIN_FUNCTIONAL_ASSESSMENT: ACTIVITIES ARE NOT PREVENTED
PAIN_FUNCTIONAL_ASSESSMENT: ACTIVITIES ARE NOT PREVENTED
PAIN_FUNCTIONAL_ASSESSMENT: 0-10

## 2025-08-02 ASSESSMENT — LIFESTYLE VARIABLES
HOW MANY STANDARD DRINKS CONTAINING ALCOHOL DO YOU HAVE ON A TYPICAL DAY: PATIENT DOES NOT DRINK
HOW OFTEN DO YOU HAVE A DRINK CONTAINING ALCOHOL: NEVER
HOW MANY STANDARD DRINKS CONTAINING ALCOHOL DO YOU HAVE ON A TYPICAL DAY: PATIENT DOES NOT DRINK
HOW OFTEN DO YOU HAVE A DRINK CONTAINING ALCOHOL: NEVER

## 2025-08-02 ASSESSMENT — PAIN DESCRIPTION - FREQUENCY
FREQUENCY: CONTINUOUS
FREQUENCY: CONTINUOUS

## 2025-08-02 ASSESSMENT — PAIN DESCRIPTION - INTENSITY: RATING_2: 7

## 2025-08-02 ASSESSMENT — PAIN DESCRIPTION - PAIN TYPE
TYPE: ACUTE PAIN
TYPE: ACUTE PAIN

## 2025-08-03 ENCOUNTER — APPOINTMENT (OUTPATIENT)
Dept: MRI IMAGING | Age: 80
DRG: 194 | End: 2025-08-03
Payer: MEDICARE

## 2025-08-03 PROBLEM — R07.9 CHEST PAIN: Status: ACTIVE | Noted: 2025-08-03

## 2025-08-03 LAB
ANION GAP SERPL CALCULATED.3IONS-SCNC: 10 MMOL/L (ref 3–16)
BASOPHILS # BLD: 0 K/UL (ref 0–0.2)
BASOPHILS NFR BLD: 0.2 %
BUN SERPL-MCNC: 8 MG/DL (ref 7–20)
CALCIUM SERPL-MCNC: 7.8 MG/DL (ref 8.3–10.6)
CHLORIDE SERPL-SCNC: 105 MMOL/L (ref 99–110)
CO2 SERPL-SCNC: 23 MMOL/L (ref 21–32)
CREAT SERPL-MCNC: 0.6 MG/DL (ref 0.6–1.2)
DEPRECATED RDW RBC AUTO: 14.2 % (ref 12.4–15.4)
EOSINOPHIL # BLD: 0.1 K/UL (ref 0–0.6)
EOSINOPHIL NFR BLD: 0.8 %
GFR SERPLBLD CREATININE-BSD FMLA CKD-EPI: >90 ML/MIN/{1.73_M2}
GLUCOSE BLD-MCNC: 80 MG/DL (ref 70–99)
GLUCOSE BLD-MCNC: 98 MG/DL (ref 70–99)
GLUCOSE SERPL-MCNC: 87 MG/DL (ref 70–99)
HCT VFR BLD AUTO: 29.1 % (ref 36–48)
HGB BLD-MCNC: 9.8 G/DL (ref 12–16)
LYMPHOCYTES # BLD: 1.1 K/UL (ref 1–5.1)
LYMPHOCYTES NFR BLD: 14.7 %
MAGNESIUM SERPL-MCNC: 2.32 MG/DL (ref 1.8–2.4)
MCH RBC QN AUTO: 31.9 PG (ref 26–34)
MCHC RBC AUTO-ENTMCNC: 33.5 G/DL (ref 31–36)
MCV RBC AUTO: 95.4 FL (ref 80–100)
MONOCYTES # BLD: 0.5 K/UL (ref 0–1.3)
MONOCYTES NFR BLD: 6.3 %
NEUTROPHILS # BLD: 6.1 K/UL (ref 1.7–7.7)
NEUTROPHILS NFR BLD: 78 %
PERFORMED ON: NORMAL
PERFORMED ON: NORMAL
PLATELET # BLD AUTO: 212 K/UL (ref 135–450)
PMV BLD AUTO: 7.6 FL (ref 5–10.5)
POTASSIUM SERPL-SCNC: 3.7 MMOL/L (ref 3.5–5.1)
RBC # BLD AUTO: 3.05 M/UL (ref 4–5.2)
SODIUM SERPL-SCNC: 138 MMOL/L (ref 136–145)
WBC # BLD AUTO: 7.8 K/UL (ref 4–11)

## 2025-08-03 PROCEDURE — 6360000002 HC RX W HCPCS: Performed by: NURSE PRACTITIONER

## 2025-08-03 PROCEDURE — 74181 MRI ABDOMEN W/O CONTRAST: CPT

## 2025-08-03 PROCEDURE — 99232 SBSQ HOSP IP/OBS MODERATE 35: CPT | Performed by: INTERNAL MEDICINE

## 2025-08-03 PROCEDURE — 6370000000 HC RX 637 (ALT 250 FOR IP): Performed by: INTERNAL MEDICINE

## 2025-08-03 PROCEDURE — 83735 ASSAY OF MAGNESIUM: CPT

## 2025-08-03 PROCEDURE — 80048 BASIC METABOLIC PNL TOTAL CA: CPT

## 2025-08-03 PROCEDURE — 92610 EVALUATE SWALLOWING FUNCTION: CPT

## 2025-08-03 PROCEDURE — 6370000000 HC RX 637 (ALT 250 FOR IP): Performed by: NURSE PRACTITIONER

## 2025-08-03 PROCEDURE — 85025 COMPLETE CBC W/AUTO DIFF WBC: CPT

## 2025-08-03 PROCEDURE — 36415 COLL VENOUS BLD VENIPUNCTURE: CPT

## 2025-08-03 PROCEDURE — 2580000003 HC RX 258: Performed by: NURSE PRACTITIONER

## 2025-08-03 PROCEDURE — 2500000003 HC RX 250 WO HCPCS: Performed by: NURSE PRACTITIONER

## 2025-08-03 PROCEDURE — 92526 ORAL FUNCTION THERAPY: CPT

## 2025-08-03 PROCEDURE — 1200000000 HC SEMI PRIVATE

## 2025-08-03 RX ORDER — DOCUSATE SODIUM 100 MG/1
100 CAPSULE, LIQUID FILLED ORAL DAILY
Status: DISCONTINUED | OUTPATIENT
Start: 2025-08-03 | End: 2025-08-04 | Stop reason: HOSPADM

## 2025-08-03 RX ADMIN — ACETAMINOPHEN 650 MG: 325 TABLET ORAL at 10:20

## 2025-08-03 RX ADMIN — RIFAXIMIN 550 MG: 550 TABLET ORAL at 21:28

## 2025-08-03 RX ADMIN — SODIUM CHLORIDE: 0.9 INJECTION, SOLUTION INTRAVENOUS at 05:14

## 2025-08-03 RX ADMIN — ALOSETRON HYDROCHLORIDE 0.5 MG: 0.5 TABLET ORAL at 10:16

## 2025-08-03 RX ADMIN — TORSEMIDE 20 MG: 20 TABLET ORAL at 10:18

## 2025-08-03 RX ADMIN — ESCITALOPRAM OXALATE 20 MG: 10 TABLET ORAL at 10:19

## 2025-08-03 RX ADMIN — AZITHROMYCIN DIHYDRATE 500 MG: 250 TABLET ORAL at 10:20

## 2025-08-03 RX ADMIN — ALPRAZOLAM 1 MG: 1 TABLET ORAL at 21:28

## 2025-08-03 RX ADMIN — Medication 1000 MCG: at 10:18

## 2025-08-03 RX ADMIN — SODIUM CHLORIDE, PRESERVATIVE FREE 10 ML: 5 INJECTION INTRAVENOUS at 21:28

## 2025-08-03 RX ADMIN — DOCUSATE SODIUM 100 MG: 100 CAPSULE, LIQUID FILLED ORAL at 10:36

## 2025-08-03 RX ADMIN — SODIUM CHLORIDE 1000 MG: 9 INJECTION, SOLUTION INTRAVENOUS at 10:27

## 2025-08-03 RX ADMIN — ENOXAPARIN SODIUM 40 MG: 100 INJECTION SUBCUTANEOUS at 21:28

## 2025-08-03 RX ADMIN — RIFAXIMIN 550 MG: 550 TABLET ORAL at 10:36

## 2025-08-03 RX ADMIN — TRAMADOL HYDROCHLORIDE 25 MG: 50 TABLET, COATED ORAL at 21:42

## 2025-08-03 RX ADMIN — ATORVASTATIN CALCIUM 20 MG: 10 TABLET, FILM COATED ORAL at 21:28

## 2025-08-03 ASSESSMENT — PAIN DESCRIPTION - ORIENTATION
ORIENTATION: LOWER
ORIENTATION: LOWER

## 2025-08-03 ASSESSMENT — PAIN DESCRIPTION - DESCRIPTORS
DESCRIPTORS: ACHING;DISCOMFORT
DESCRIPTORS: ACHING

## 2025-08-03 ASSESSMENT — PAIN SCALES - GENERAL
PAINLEVEL_OUTOF10: 3
PAINLEVEL_OUTOF10: 3
PAINLEVEL_OUTOF10: 4
PAINLEVEL_OUTOF10: 6

## 2025-08-03 ASSESSMENT — PAIN DESCRIPTION - PAIN TYPE
TYPE: ACUTE PAIN;CHRONIC PAIN
TYPE: CHRONIC PAIN

## 2025-08-03 ASSESSMENT — PAIN DESCRIPTION - FREQUENCY
FREQUENCY: CONTINUOUS
FREQUENCY: CONTINUOUS

## 2025-08-03 ASSESSMENT — PAIN DESCRIPTION - LOCATION
LOCATION: BACK
LOCATION: BACK

## 2025-08-03 ASSESSMENT — PAIN DESCRIPTION - ONSET
ONSET: ON-GOING
ONSET: ON-GOING

## 2025-08-04 ENCOUNTER — APPOINTMENT (OUTPATIENT)
Age: 80
DRG: 194 | End: 2025-08-04
Payer: MEDICARE

## 2025-08-04 VITALS
BODY MASS INDEX: 25.1 KG/M2 | WEIGHT: 156.2 LBS | SYSTOLIC BLOOD PRESSURE: 136 MMHG | HEIGHT: 66 IN | OXYGEN SATURATION: 96 % | HEART RATE: 73 BPM | RESPIRATION RATE: 16 BRPM | TEMPERATURE: 98.1 F | DIASTOLIC BLOOD PRESSURE: 71 MMHG

## 2025-08-04 PROBLEM — K63.8219 SMALL INTESTINAL BACTERIAL OVERGROWTH (SIBO): Status: ACTIVE | Noted: 2025-08-04

## 2025-08-04 LAB
ANION GAP SERPL CALCULATED.3IONS-SCNC: 13 MMOL/L (ref 3–16)
BASOPHILS # BLD: 0 K/UL (ref 0–0.2)
BASOPHILS NFR BLD: 0.5 %
BUN SERPL-MCNC: 9 MG/DL (ref 7–20)
CALCIUM SERPL-MCNC: 7.5 MG/DL (ref 8.3–10.6)
CHLORIDE SERPL-SCNC: 105 MMOL/L (ref 99–110)
CO2 SERPL-SCNC: 22 MMOL/L (ref 21–32)
CREAT SERPL-MCNC: 0.6 MG/DL (ref 0.6–1.2)
DEPRECATED RDW RBC AUTO: 13.9 % (ref 12.4–15.4)
EOSINOPHIL # BLD: 0.1 K/UL (ref 0–0.6)
EOSINOPHIL NFR BLD: 1.2 %
FERRITIN SERPL IA-MCNC: 180 NG/ML (ref 15–150)
GFR SERPLBLD CREATININE-BSD FMLA CKD-EPI: >90 ML/MIN/{1.73_M2}
GLUCOSE SERPL-MCNC: 95 MG/DL (ref 70–99)
HCT VFR BLD AUTO: 32 % (ref 36–48)
HGB BLD-MCNC: 10.8 G/DL (ref 12–16)
IRON SATN MFR SERPL: 11 % (ref 15–50)
IRON SERPL-MCNC: 24 UG/DL (ref 37–145)
LEGIONELLA AG UR QL: NORMAL
LYMPHOCYTES # BLD: 1.7 K/UL (ref 1–5.1)
LYMPHOCYTES NFR BLD: 23.7 %
MAGNESIUM SERPL-MCNC: 1.71 MG/DL (ref 1.8–2.4)
MCH RBC QN AUTO: 31.9 PG (ref 26–34)
MCHC RBC AUTO-ENTMCNC: 33.9 G/DL (ref 31–36)
MCV RBC AUTO: 94.3 FL (ref 80–100)
MONOCYTES # BLD: 0.5 K/UL (ref 0–1.3)
MONOCYTES NFR BLD: 7.1 %
NEUTROPHILS # BLD: 4.7 K/UL (ref 1.7–7.7)
NEUTROPHILS NFR BLD: 67.5 %
PLATELET # BLD AUTO: 247 K/UL (ref 135–450)
PMV BLD AUTO: 7.3 FL (ref 5–10.5)
POTASSIUM SERPL-SCNC: 3.3 MMOL/L (ref 3.5–5.1)
RBC # BLD AUTO: 3.39 M/UL (ref 4–5.2)
S PNEUM AG UR QL: NORMAL
SODIUM SERPL-SCNC: 140 MMOL/L (ref 136–145)
TIBC SERPL-MCNC: 212 UG/DL (ref 260–445)
TSH SERPL DL<=0.005 MIU/L-ACNC: 0.78 UIU/ML (ref 0.27–4.2)
WBC # BLD AUTO: 7 K/UL (ref 4–11)

## 2025-08-04 PROCEDURE — 2500000003 HC RX 250 WO HCPCS: Performed by: NURSE PRACTITIONER

## 2025-08-04 PROCEDURE — 36415 COLL VENOUS BLD VENIPUNCTURE: CPT

## 2025-08-04 PROCEDURE — 83735 ASSAY OF MAGNESIUM: CPT

## 2025-08-04 PROCEDURE — 80048 BASIC METABOLIC PNL TOTAL CA: CPT

## 2025-08-04 PROCEDURE — 83540 ASSAY OF IRON: CPT

## 2025-08-04 PROCEDURE — 6370000000 HC RX 637 (ALT 250 FOR IP): Performed by: NURSE PRACTITIONER

## 2025-08-04 PROCEDURE — 82728 ASSAY OF FERRITIN: CPT

## 2025-08-04 PROCEDURE — 6360000002 HC RX W HCPCS: Performed by: NURSE PRACTITIONER

## 2025-08-04 PROCEDURE — 2580000003 HC RX 258: Performed by: NURSE PRACTITIONER

## 2025-08-04 PROCEDURE — 85025 COMPLETE CBC W/AUTO DIFF WBC: CPT

## 2025-08-04 PROCEDURE — 6370000000 HC RX 637 (ALT 250 FOR IP): Performed by: INTERNAL MEDICINE

## 2025-08-04 PROCEDURE — 99238 HOSP IP/OBS DSCHRG MGMT 30/<: CPT | Performed by: INTERNAL MEDICINE

## 2025-08-04 PROCEDURE — 84443 ASSAY THYROID STIM HORMONE: CPT

## 2025-08-04 PROCEDURE — 83550 IRON BINDING TEST: CPT

## 2025-08-04 RX ORDER — LEVOFLOXACIN 500 MG/1
500 TABLET, FILM COATED ORAL DAILY
Qty: 4 TABLET | Refills: 0 | Status: SHIPPED | OUTPATIENT
Start: 2025-08-04 | End: 2025-08-08

## 2025-08-04 RX ADMIN — TRAMADOL HYDROCHLORIDE 25 MG: 50 TABLET, COATED ORAL at 06:56

## 2025-08-04 RX ADMIN — Medication 1000 MCG: at 09:14

## 2025-08-04 RX ADMIN — TORSEMIDE 20 MG: 20 TABLET ORAL at 09:13

## 2025-08-04 RX ADMIN — SODIUM CHLORIDE 1000 MG: 9 INJECTION, SOLUTION INTRAVENOUS at 09:12

## 2025-08-04 RX ADMIN — SODIUM CHLORIDE, PRESERVATIVE FREE 10 ML: 5 INJECTION INTRAVENOUS at 09:14

## 2025-08-04 RX ADMIN — ESCITALOPRAM OXALATE 20 MG: 10 TABLET ORAL at 09:14

## 2025-08-04 RX ADMIN — ACETAMINOPHEN 650 MG: 325 TABLET ORAL at 02:24

## 2025-08-04 RX ADMIN — AZITHROMYCIN DIHYDRATE 500 MG: 250 TABLET ORAL at 09:14

## 2025-08-04 RX ADMIN — RIFAXIMIN 550 MG: 550 TABLET ORAL at 09:14

## 2025-08-04 ASSESSMENT — PAIN DESCRIPTION - PAIN TYPE: TYPE: ACUTE PAIN

## 2025-08-04 ASSESSMENT — PAIN SCALES - GENERAL
PAINLEVEL_OUTOF10: 2
PAINLEVEL_OUTOF10: 6
PAINLEVEL_OUTOF10: 0
PAINLEVEL_OUTOF10: 6
PAINLEVEL_OUTOF10: 3

## 2025-08-04 ASSESSMENT — PAIN DESCRIPTION - LOCATION
LOCATION: BACK
LOCATION: BACK

## 2025-08-04 ASSESSMENT — PAIN DESCRIPTION - FREQUENCY: FREQUENCY: CONTINUOUS

## 2025-08-04 ASSESSMENT — PAIN DESCRIPTION - ORIENTATION
ORIENTATION: LOWER
ORIENTATION: LOWER

## 2025-08-04 ASSESSMENT — PAIN - FUNCTIONAL ASSESSMENT: PAIN_FUNCTIONAL_ASSESSMENT: ACTIVITIES ARE NOT PREVENTED

## 2025-08-04 ASSESSMENT — PAIN DESCRIPTION - DIRECTION: RADIATING_TOWARDS: BILATERAL HIP

## 2025-08-04 ASSESSMENT — PAIN DESCRIPTION - DESCRIPTORS
DESCRIPTORS: ACHING
DESCRIPTORS: ACHING

## 2025-08-04 ASSESSMENT — PAIN DESCRIPTION - ONSET: ONSET: ON-GOING

## 2025-08-06 LAB
BACTERIA BLD CULT ORG #2: NORMAL
BACTERIA BLD CULT: NORMAL

## (undated) DEVICE — CLEANER,CAUTERY TIP,2X2",STERILE: Brand: MEDLINE

## (undated) DEVICE — RESERVOIR,SUCTION,100CC,SILICONE: Brand: MEDLINE

## (undated) DEVICE — SUTURE VCRL SZ 3-0 L27IN ABSRB UD L26MM SH 1/2 CIR J416H

## (undated) DEVICE — PREMIUM WET SKIN PREP TRAY: Brand: MEDLINE INDUSTRIES, INC.

## (undated) DEVICE — SYRINGE MED 10ML LUERLOCK TIP W/O SFTY DISP

## (undated) DEVICE — CLIP LIG M BLU TI HRT SHP WIRE HORZ 600 PER BX

## (undated) DEVICE — SUTURE COAT VCRL SZ 4-0 L18IN ABSRB UD L19MM PS-2 1/2 CIR J496G

## (undated) DEVICE — 3M™ TEGADERM™ TRANSPARENT FILM DRESSING FRAME STYLE, 1626W, 4 IN X 4-3/4 IN (10 CM X 12 CM), 50/CT 4CT/CASE: Brand: 3M™ TEGADERM™

## (undated) DEVICE — TOWEL,STOP FLAG GOLD N-W: Brand: MEDLINE

## (undated) DEVICE — SPONGE GZ W4XL8IN COT WVN 12 PLY

## (undated) DEVICE — SPONGE,LAP,18"X18",DLX,XR,ST,5/PK,40/PK: Brand: MEDLINE

## (undated) DEVICE — SHEET,DRAPE,40X58,STERILE: Brand: MEDLINE

## (undated) DEVICE — DRAPE,CHEST,FENES,15X10,STERIL: Brand: MEDLINE

## (undated) DEVICE — MINOR BREAST: Brand: MEDLINE INDUSTRIES, INC.

## (undated) DEVICE — Z DISCONTINUED USE 2272114 DRAPE SURG UTIL 26X15 IN W/ TAPE N INVASIVE MULTLYR DISP

## (undated) DEVICE — 3M™ STERI-STRIP™ COMPOUND BENZOIN TINCTURE 40 BAGS/CARTON 4 CARTONS/CASE C1544: Brand: 3M™ STERI-STRIP™

## (undated) DEVICE — 3M™ STERI-STRIP™ REINFORCED ADHESIVE SKIN CLOSURES, R1547, 1/2 IN X 4 IN (12 MM X 100 MM), 6 STRIPS/ENVELOPE: Brand: 3M™ STERI-STRIP™

## (undated) DEVICE — DRAIN WND SIL FLAT RADIOPAQUE 10MM FULL FLUTED

## (undated) DEVICE — GLOVE SURG SZ 7 L11.2IN THK9.8MIL STRW LTX POLYMER BEAD CUF